# Patient Record
Sex: MALE | Race: WHITE | NOT HISPANIC OR LATINO | Employment: OTHER | RURAL
[De-identification: names, ages, dates, MRNs, and addresses within clinical notes are randomized per-mention and may not be internally consistent; named-entity substitution may affect disease eponyms.]

---

## 2014-06-30 LAB — CRC RECOMMENDATION EXT: NORMAL

## 2020-08-18 ENCOUNTER — HISTORICAL (OUTPATIENT)
Dept: ADMINISTRATIVE | Facility: HOSPITAL | Age: 75
End: 2020-08-18

## 2020-08-18 LAB
ALT SERPL W P-5'-P-CCNC: 26 U/L (ref 16–61)
CHOLEST SERPL-MCNC: 119 MG/DL
CHOLEST/HDLC SERPL: 2.7 {RATIO}
HDLC SERPL-MCNC: 44 MG/DL
LDLC SERPL CALC-MCNC: 57 MG/DL
TRIGL SERPL-MCNC: 90 MG/DL

## 2020-10-23 ENCOUNTER — HISTORICAL (OUTPATIENT)
Dept: ADMINISTRATIVE | Facility: HOSPITAL | Age: 75
End: 2020-10-23

## 2021-05-27 ENCOUNTER — OFFICE VISIT (OUTPATIENT)
Dept: FAMILY MEDICINE | Facility: CLINIC | Age: 76
End: 2021-05-27
Payer: MEDICARE

## 2021-05-27 VITALS
HEIGHT: 73 IN | RESPIRATION RATE: 18 BRPM | WEIGHT: 239 LBS | SYSTOLIC BLOOD PRESSURE: 111 MMHG | HEART RATE: 81 BPM | DIASTOLIC BLOOD PRESSURE: 59 MMHG | BODY MASS INDEX: 31.68 KG/M2 | OXYGEN SATURATION: 96 %

## 2021-05-27 DIAGNOSIS — R50.9 FEVER, UNSPECIFIED FEVER CAUSE: Primary | ICD-10-CM

## 2021-05-27 DIAGNOSIS — N41.9 PROSTATITIS, UNSPECIFIED PROSTATITIS TYPE: ICD-10-CM

## 2021-05-27 DIAGNOSIS — K21.9 GASTROESOPHAGEAL REFLUX DISEASE, UNSPECIFIED WHETHER ESOPHAGITIS PRESENT: Chronic | ICD-10-CM

## 2021-05-27 DIAGNOSIS — I10 ESSENTIAL HYPERTENSION: Chronic | ICD-10-CM

## 2021-05-27 DIAGNOSIS — M54.50 LOW BACK PAIN, UNSPECIFIED BACK PAIN LATERALITY, UNSPECIFIED CHRONICITY, UNSPECIFIED WHETHER SCIATICA PRESENT: ICD-10-CM

## 2021-05-27 DIAGNOSIS — M54.9 BACK PAIN, UNSPECIFIED BACK LOCATION, UNSPECIFIED BACK PAIN LATERALITY, UNSPECIFIED CHRONICITY: ICD-10-CM

## 2021-05-27 LAB
BILIRUB UR QL STRIP: NEGATIVE
CLARITY UR: CLEAR
COLOR UR: YELLOW
GLUCOSE UR STRIP-MCNC: NEGATIVE MG/DL
KETONES UR STRIP-SCNC: NEGATIVE MG/DL
LEUKOCYTE ESTERASE UR QL STRIP: NEGATIVE
NITRITE UR QL STRIP: NEGATIVE
PH UR STRIP: 5 PH UNITS
PROT UR QL STRIP: NEGATIVE
RBC # UR STRIP: NEGATIVE /UL
SP GR UR STRIP: 1.02
UROBILINOGEN UR STRIP-ACNC: 1 MG/DL

## 2021-05-27 PROCEDURE — 99214 PR OFFICE/OUTPT VISIT, EST, LEVL IV, 30-39 MIN: ICD-10-PCS | Mod: 25,,, | Performed by: FAMILY MEDICINE

## 2021-05-27 PROCEDURE — 84153 ASSAY OF PSA TOTAL: CPT | Mod: ,,, | Performed by: CLINICAL MEDICAL LABORATORY

## 2021-05-27 PROCEDURE — 81003 URINALYSIS AUTO W/O SCOPE: CPT | Mod: QW,,, | Performed by: CLINICAL MEDICAL LABORATORY

## 2021-05-27 PROCEDURE — 96372 PR INJECTION,THERAP/PROPH/DIAG2ST, IM OR SUBCUT: ICD-10-PCS | Mod: ,,, | Performed by: FAMILY MEDICINE

## 2021-05-27 PROCEDURE — 80053 COMPREHENSIVE METABOLIC PANEL: ICD-10-PCS | Mod: ,,, | Performed by: CLINICAL MEDICAL LABORATORY

## 2021-05-27 PROCEDURE — 81003 URINALYSIS, REFLEX TO URINE CULTURE: ICD-10-PCS | Mod: QW,,, | Performed by: CLINICAL MEDICAL LABORATORY

## 2021-05-27 PROCEDURE — 99214 OFFICE O/P EST MOD 30 MIN: CPT | Mod: 25,,, | Performed by: FAMILY MEDICINE

## 2021-05-27 PROCEDURE — 80053 COMPREHEN METABOLIC PANEL: CPT | Mod: ,,, | Performed by: CLINICAL MEDICAL LABORATORY

## 2021-05-27 PROCEDURE — 84153 PSA, TOTAL (DIAGNOSTIC): ICD-10-PCS | Mod: ,,, | Performed by: CLINICAL MEDICAL LABORATORY

## 2021-05-27 PROCEDURE — 96372 THER/PROPH/DIAG INJ SC/IM: CPT | Mod: ,,, | Performed by: FAMILY MEDICINE

## 2021-05-27 RX ORDER — ONDANSETRON 4 MG/1
TABLET, FILM COATED ORAL
COMMUNITY
Start: 2021-04-21 | End: 2022-03-25 | Stop reason: ALTCHOICE

## 2021-05-27 RX ORDER — METOPROLOL TARTRATE 25 MG/1
TABLET, FILM COATED ORAL
COMMUNITY
Start: 2021-04-13 | End: 2022-04-08 | Stop reason: SDUPTHER

## 2021-05-27 RX ORDER — CLOPIDOGREL BISULFATE 75 MG/1
75 TABLET ORAL DAILY
COMMUNITY
Start: 2021-04-23 | End: 2022-03-21

## 2021-05-27 RX ORDER — KETOROLAC TROMETHAMINE 30 MG/ML
60 INJECTION, SOLUTION INTRAMUSCULAR; INTRAVENOUS
Status: COMPLETED | OUTPATIENT
Start: 2021-05-27 | End: 2021-05-27

## 2021-05-27 RX ORDER — AMOXICILLIN 500 MG
1 CAPSULE ORAL DAILY
COMMUNITY
End: 2022-04-08 | Stop reason: SDUPTHER

## 2021-05-27 RX ORDER — FUROSEMIDE 20 MG/1
20 TABLET ORAL DAILY
COMMUNITY
Start: 2021-03-08 | End: 2021-06-03

## 2021-05-27 RX ORDER — ASPIRIN 81 MG/1
81 TABLET ORAL DAILY
COMMUNITY
End: 2023-07-10

## 2021-05-27 RX ORDER — OMEPRAZOLE 20 MG/1
20 CAPSULE, DELAYED RELEASE ORAL DAILY
Qty: 90 CAPSULE | Refills: 1 | Status: SHIPPED | OUTPATIENT
Start: 2021-05-27 | End: 2022-02-15

## 2021-05-27 RX ORDER — FLUTICASONE FUROATE AND VILANTEROL TRIFENATATE 200; 25 UG/1; UG/1
1 POWDER RESPIRATORY (INHALATION) DAILY
COMMUNITY
Start: 2021-05-17

## 2021-05-27 RX ORDER — LISINOPRIL 10 MG/1
10 TABLET ORAL DAILY
COMMUNITY
Start: 2021-05-06 | End: 2021-10-26 | Stop reason: SDUPTHER

## 2021-05-27 RX ORDER — OMEPRAZOLE 20 MG/1
20 CAPSULE, DELAYED RELEASE ORAL DAILY
COMMUNITY
End: 2021-05-27 | Stop reason: SDUPTHER

## 2021-05-27 RX ADMIN — KETOROLAC TROMETHAMINE 60 MG: 30 INJECTION, SOLUTION INTRAMUSCULAR; INTRAVENOUS at 04:05

## 2021-05-28 DIAGNOSIS — N41.9 PROSTATITIS, UNSPECIFIED PROSTATITIS TYPE: Primary | ICD-10-CM

## 2021-05-28 LAB
ALBUMIN SERPL BCP-MCNC: 3.5 G/DL (ref 3.5–5)
ALBUMIN/GLOB SERPL: 1.1 {RATIO}
ALP SERPL-CCNC: 121 U/L (ref 45–115)
ALT SERPL W P-5'-P-CCNC: 22 U/L (ref 16–61)
ANION GAP SERPL CALCULATED.3IONS-SCNC: 7 MMOL/L (ref 7–16)
AST SERPL W P-5'-P-CCNC: 17 U/L (ref 15–37)
BILIRUB SERPL-MCNC: 0.6 MG/DL (ref 0–1.2)
BUN SERPL-MCNC: 19 MG/DL (ref 7–18)
BUN/CREAT SERPL: 12 (ref 6–20)
CALCIUM SERPL-MCNC: 8.8 MG/DL (ref 8.5–10.1)
CHLORIDE SERPL-SCNC: 105 MMOL/L (ref 98–107)
CO2 SERPL-SCNC: 29 MMOL/L (ref 21–32)
CREAT SERPL-MCNC: 1.56 MG/DL (ref 0.7–1.3)
GLOBULIN SER-MCNC: 3.3 G/DL (ref 2–4)
GLUCOSE SERPL-MCNC: 105 MG/DL (ref 74–106)
POTASSIUM SERPL-SCNC: 4.1 MMOL/L (ref 3.5–5.1)
PROT SERPL-MCNC: 6.8 G/DL (ref 6.4–8.2)
PSA SERPL-MCNC: 39.7 NG/ML (ref 0–4.4)
SODIUM SERPL-SCNC: 137 MMOL/L (ref 136–145)

## 2021-05-28 RX ORDER — CIPROFLOXACIN 500 MG/1
500 TABLET ORAL 2 TIMES DAILY
Qty: 60 TABLET | Refills: 0 | Status: SHIPPED | OUTPATIENT
Start: 2021-05-28 | End: 2021-06-27

## 2021-06-04 ENCOUNTER — HOSPITAL ENCOUNTER (OUTPATIENT)
Dept: RADIOLOGY | Facility: HOSPITAL | Age: 76
Discharge: HOME | End: 2021-06-04
Attending: FAMILY MEDICINE
Payer: MEDICARE

## 2021-06-04 ENCOUNTER — OFFICE VISIT (OUTPATIENT)
Dept: FAMILY MEDICINE | Facility: CLINIC | Age: 76
End: 2021-06-04
Payer: MEDICARE

## 2021-06-04 VITALS
BODY MASS INDEX: 30.48 KG/M2 | TEMPERATURE: 98 F | DIASTOLIC BLOOD PRESSURE: 56 MMHG | RESPIRATION RATE: 18 BRPM | HEART RATE: 73 BPM | HEIGHT: 73 IN | OXYGEN SATURATION: 95 % | WEIGHT: 230 LBS | SYSTOLIC BLOOD PRESSURE: 107 MMHG

## 2021-06-04 DIAGNOSIS — I82.401 ACUTE DEEP VEIN THROMBOSIS (DVT) OF RIGHT LOWER EXTREMITY, UNSPECIFIED VEIN: ICD-10-CM

## 2021-06-04 DIAGNOSIS — M25.569 KNEE PAIN, UNSPECIFIED CHRONICITY, UNSPECIFIED LATERALITY: ICD-10-CM

## 2021-06-04 DIAGNOSIS — M25.469 KNEE SWELLING: ICD-10-CM

## 2021-06-04 DIAGNOSIS — T81.72XA COMPLICATION OF VEIN FOLLOWING A PROCEDURE, NOT ELSEWHERE CLASSIFIED, INITIAL ENCOUNTER: ICD-10-CM

## 2021-06-04 DIAGNOSIS — T84.50XA INFECTION OF PROSTHETIC JOINT, INITIAL ENCOUNTER: Primary | ICD-10-CM

## 2021-06-04 PROCEDURE — 99214 OFFICE O/P EST MOD 30 MIN: CPT | Mod: ,,, | Performed by: FAMILY MEDICINE

## 2021-06-04 PROCEDURE — 99214 PR OFFICE/OUTPT VISIT, EST, LEVL IV, 30-39 MIN: ICD-10-PCS | Mod: ,,, | Performed by: FAMILY MEDICINE

## 2021-06-04 PROCEDURE — 93970 EXTREMITY STUDY: CPT | Mod: TC

## 2021-06-04 RX ORDER — HYDROCODONE BITARTRATE AND ACETAMINOPHEN 7.5; 325 MG/1; MG/1
1 TABLET ORAL EVERY 6 HOURS PRN
Qty: 15 TABLET | Refills: 0 | Status: SHIPPED | OUTPATIENT
Start: 2021-06-04 | End: 2022-03-25 | Stop reason: ALTCHOICE

## 2021-06-04 RX ORDER — CLINDAMYCIN HYDROCHLORIDE 300 MG/1
300 CAPSULE ORAL 3 TIMES DAILY
Qty: 30 CAPSULE | Refills: 0 | Status: SHIPPED | OUTPATIENT
Start: 2021-06-04 | End: 2022-03-25 | Stop reason: ALTCHOICE

## 2021-06-04 RX ORDER — TRAMADOL HYDROCHLORIDE 50 MG/1
TABLET ORAL
COMMUNITY
Start: 2021-06-03 | End: 2022-03-25 | Stop reason: ALTCHOICE

## 2021-07-29 ENCOUNTER — OFFICE VISIT (OUTPATIENT)
Dept: FAMILY MEDICINE | Facility: CLINIC | Age: 76
End: 2021-07-29
Payer: MEDICARE

## 2021-07-29 VITALS
BODY MASS INDEX: 30.3 KG/M2 | HEIGHT: 73 IN | HEART RATE: 78 BPM | OXYGEN SATURATION: 98 % | TEMPERATURE: 98 F | WEIGHT: 228.63 LBS | RESPIRATION RATE: 18 BRPM | DIASTOLIC BLOOD PRESSURE: 68 MMHG | SYSTOLIC BLOOD PRESSURE: 112 MMHG

## 2021-07-29 DIAGNOSIS — K21.9 GASTROESOPHAGEAL REFLUX DISEASE WITHOUT ESOPHAGITIS: Chronic | ICD-10-CM

## 2021-07-29 DIAGNOSIS — E55.9 VITAMIN D DEFICIENCY: ICD-10-CM

## 2021-07-29 DIAGNOSIS — E78.2 MIXED HYPERLIPIDEMIA: Chronic | ICD-10-CM

## 2021-07-29 DIAGNOSIS — E53.8 VITAMIN B12 DEFICIENCY: ICD-10-CM

## 2021-07-29 DIAGNOSIS — I10 ESSENTIAL HYPERTENSION: Chronic | ICD-10-CM

## 2021-07-29 DIAGNOSIS — N41.0 ACUTE PROSTATITIS: ICD-10-CM

## 2021-07-29 DIAGNOSIS — R53.82 CHRONIC FATIGUE: Primary | ICD-10-CM

## 2021-07-29 DIAGNOSIS — R97.20 ELEVATED PSA, GREATER THAN OR EQUAL TO 20 NG/ML: ICD-10-CM

## 2021-07-29 DIAGNOSIS — N28.9 ABNORMAL KIDNEY FUNCTION: Chronic | ICD-10-CM

## 2021-07-29 LAB
25(OH)D3 SERPL-MCNC: 21.9 NG/ML
ALBUMIN SERPL BCP-MCNC: 3.2 G/DL (ref 3.5–5)
ALBUMIN/GLOB SERPL: 0.8 {RATIO}
ALP SERPL-CCNC: 113 U/L (ref 45–115)
ALT SERPL W P-5'-P-CCNC: 25 U/L (ref 16–61)
ANION GAP SERPL CALCULATED.3IONS-SCNC: 11 MMOL/L (ref 7–16)
AST SERPL W P-5'-P-CCNC: 22 U/L (ref 15–37)
BASOPHILS # BLD AUTO: 0.03 K/UL (ref 0–0.2)
BASOPHILS NFR BLD AUTO: 0.5 % (ref 0–1)
BILIRUB SERPL-MCNC: 0.3 MG/DL (ref 0–1.2)
BUN SERPL-MCNC: 11 MG/DL (ref 7–18)
BUN/CREAT SERPL: 10 (ref 6–20)
CALCIUM SERPL-MCNC: 9 MG/DL (ref 8.5–10.1)
CHLORIDE SERPL-SCNC: 103 MMOL/L (ref 98–107)
CO2 SERPL-SCNC: 28 MMOL/L (ref 21–32)
CREAT SERPL-MCNC: 1.13 MG/DL (ref 0.7–1.3)
DIFFERENTIAL METHOD BLD: ABNORMAL
EOSINOPHIL # BLD AUTO: 0.17 K/UL (ref 0–0.5)
EOSINOPHIL NFR BLD AUTO: 2.7 % (ref 1–4)
ERYTHROCYTE [DISTWIDTH] IN BLOOD BY AUTOMATED COUNT: 13.7 % (ref 11.5–14.5)
GLOBULIN SER-MCNC: 4.1 G/DL (ref 2–4)
GLUCOSE SERPL-MCNC: 88 MG/DL (ref 74–106)
HCT VFR BLD AUTO: 37.8 % (ref 40–54)
HGB BLD-MCNC: 12.2 G/DL (ref 13.5–18)
IMM GRANULOCYTES # BLD AUTO: 0.02 K/UL (ref 0–0.04)
IMM GRANULOCYTES NFR BLD: 0.3 % (ref 0–0.4)
LYMPHOCYTES # BLD AUTO: 1.73 K/UL (ref 1–4.8)
LYMPHOCYTES NFR BLD AUTO: 27.1 % (ref 27–41)
MCH RBC QN AUTO: 30.6 PG (ref 27–31)
MCHC RBC AUTO-ENTMCNC: 32.3 G/DL (ref 32–36)
MCV RBC AUTO: 94.7 FL (ref 80–96)
MONOCYTES # BLD AUTO: 0.63 K/UL (ref 0–0.8)
MONOCYTES NFR BLD AUTO: 9.9 % (ref 2–6)
MPC BLD CALC-MCNC: 9 FL (ref 9.4–12.4)
NEUTROPHILS # BLD AUTO: 3.81 K/UL (ref 1.8–7.7)
NEUTROPHILS NFR BLD AUTO: 59.5 % (ref 53–65)
NRBC # BLD AUTO: 0 X10E3/UL
NRBC, AUTO (.00): 0 %
PLATELET # BLD AUTO: 320 K/UL (ref 150–400)
POTASSIUM SERPL-SCNC: 4.5 MMOL/L (ref 3.5–5.1)
PROT SERPL-MCNC: 7.3 G/DL (ref 6.4–8.2)
PSA SERPL-MCNC: 42.4 NG/ML (ref 0–4.4)
RBC # BLD AUTO: 3.99 M/UL (ref 4.6–6.2)
SODIUM SERPL-SCNC: 137 MMOL/L (ref 136–145)
T4 FREE SERPL-MCNC: 1.08 NG/DL (ref 0.76–1.46)
TSH SERPL DL<=0.005 MIU/L-ACNC: 1.84 UIU/ML (ref 0.36–3.74)
VIT B12 SERPL-MCNC: 381 PG/ML (ref 193–986)
WBC # BLD AUTO: 6.39 K/UL (ref 4.5–11)

## 2021-07-29 PROCEDURE — 82607 VITAMIN B12: ICD-10-PCS | Mod: ,,, | Performed by: CLINICAL MEDICAL LABORATORY

## 2021-07-29 PROCEDURE — 84443 THYROID PANEL: ICD-10-PCS | Mod: ,,, | Performed by: CLINICAL MEDICAL LABORATORY

## 2021-07-29 PROCEDURE — 84439 ASSAY OF FREE THYROXINE: CPT | Mod: ,,, | Performed by: CLINICAL MEDICAL LABORATORY

## 2021-07-29 PROCEDURE — 84439 THYROID PANEL: ICD-10-PCS | Mod: ,,, | Performed by: CLINICAL MEDICAL LABORATORY

## 2021-07-29 PROCEDURE — 99214 OFFICE O/P EST MOD 30 MIN: CPT | Mod: ,,, | Performed by: FAMILY MEDICINE

## 2021-07-29 PROCEDURE — 80053 COMPREHENSIVE METABOLIC PANEL: ICD-10-PCS | Mod: ,,, | Performed by: CLINICAL MEDICAL LABORATORY

## 2021-07-29 PROCEDURE — 82306 VITAMIN D: ICD-10-PCS | Mod: ,,, | Performed by: CLINICAL MEDICAL LABORATORY

## 2021-07-29 PROCEDURE — 85025 COMPLETE CBC W/AUTO DIFF WBC: CPT | Mod: ,,, | Performed by: CLINICAL MEDICAL LABORATORY

## 2021-07-29 PROCEDURE — 99214 PR OFFICE/OUTPT VISIT, EST, LEVL IV, 30-39 MIN: ICD-10-PCS | Mod: ,,, | Performed by: FAMILY MEDICINE

## 2021-07-29 PROCEDURE — 82306 VITAMIN D 25 HYDROXY: CPT | Mod: ,,, | Performed by: CLINICAL MEDICAL LABORATORY

## 2021-07-29 PROCEDURE — 84153 PSA, TOTAL (DIAGNOSTIC): ICD-10-PCS | Mod: ,,, | Performed by: CLINICAL MEDICAL LABORATORY

## 2021-07-29 PROCEDURE — 80053 COMPREHEN METABOLIC PANEL: CPT | Mod: ,,, | Performed by: CLINICAL MEDICAL LABORATORY

## 2021-07-29 PROCEDURE — 84153 ASSAY OF PSA TOTAL: CPT | Mod: ,,, | Performed by: CLINICAL MEDICAL LABORATORY

## 2021-07-29 PROCEDURE — 84443 ASSAY THYROID STIM HORMONE: CPT | Mod: ,,, | Performed by: CLINICAL MEDICAL LABORATORY

## 2021-07-29 PROCEDURE — 82607 VITAMIN B-12: CPT | Mod: ,,, | Performed by: CLINICAL MEDICAL LABORATORY

## 2021-07-29 PROCEDURE — 85025 CBC WITH DIFFERENTIAL: ICD-10-PCS | Mod: ,,, | Performed by: CLINICAL MEDICAL LABORATORY

## 2021-07-29 RX ORDER — TAMSULOSIN HYDROCHLORIDE 0.4 MG/1
0.4 CAPSULE ORAL DAILY
Qty: 30 CAPSULE | Refills: 2 | Status: SHIPPED | OUTPATIENT
Start: 2021-07-29 | End: 2022-03-25 | Stop reason: ALTCHOICE

## 2021-07-30 ENCOUNTER — TELEPHONE (OUTPATIENT)
Dept: FAMILY MEDICINE | Facility: CLINIC | Age: 76
End: 2021-07-30

## 2021-07-30 DIAGNOSIS — N41.0 ACUTE PROSTATITIS: Primary | ICD-10-CM

## 2021-10-04 ENCOUNTER — HOSPITAL ENCOUNTER (OUTPATIENT)
Dept: CARDIOLOGY | Facility: HOSPITAL | Age: 76
Discharge: HOME | End: 2021-10-04
Attending: INTERNAL MEDICINE
Payer: MEDICARE

## 2021-10-04 DIAGNOSIS — Z95.810 PRESENCE OF CARDIAC DEFIBRILLATOR: ICD-10-CM

## 2021-10-04 DIAGNOSIS — Z95.810 PRESENCE OF CARDIAC DEFIBRILLATOR: Primary | ICD-10-CM

## 2021-10-04 PROCEDURE — 93295 CARDIAC DEVICE CHECK - REMOTE: ICD-10-PCS | Mod: ,,, | Performed by: INTERNAL MEDICINE

## 2021-10-04 PROCEDURE — 93296 REM INTERROG EVL PM/IDS: CPT

## 2021-10-04 PROCEDURE — 93295 DEV INTERROG REMOTE 1/2/MLT: CPT | Mod: ,,, | Performed by: INTERNAL MEDICINE

## 2021-10-06 LAB
OHS CV DC PP MS1: 0.4 MS
OHS CV DC PP MS2: 0.4 MS
OHS CV DC PP V1: 1.5 V
OHS CV DC PP V2: 2 V

## 2021-10-25 ENCOUNTER — OFFICE VISIT (OUTPATIENT)
Dept: CARDIOLOGY | Facility: CLINIC | Age: 76
End: 2021-10-25
Payer: MEDICARE

## 2021-10-25 VITALS
DIASTOLIC BLOOD PRESSURE: 54 MMHG | RESPIRATION RATE: 16 BRPM | WEIGHT: 234 LBS | HEIGHT: 73 IN | HEART RATE: 64 BPM | BODY MASS INDEX: 31.01 KG/M2 | SYSTOLIC BLOOD PRESSURE: 90 MMHG

## 2021-10-25 DIAGNOSIS — I25.5 ISCHEMIC CARDIOMYOPATHY: ICD-10-CM

## 2021-10-25 DIAGNOSIS — I25.10 CORONARY ARTERY DISEASE INVOLVING NATIVE HEART WITHOUT ANGINA PECTORIS, UNSPECIFIED VESSEL OR LESION TYPE: Primary | ICD-10-CM

## 2021-10-25 DIAGNOSIS — E78.5 HYPERLIPIDEMIA, UNSPECIFIED HYPERLIPIDEMIA TYPE: ICD-10-CM

## 2021-10-25 DIAGNOSIS — R53.83 FATIGUE, UNSPECIFIED TYPE: ICD-10-CM

## 2021-10-25 DIAGNOSIS — I10 HYPERTENSION, ESSENTIAL: ICD-10-CM

## 2021-10-25 PROCEDURE — 99214 OFFICE O/P EST MOD 30 MIN: CPT | Mod: PBBFAC | Performed by: INTERNAL MEDICINE

## 2021-10-25 PROCEDURE — 93010 ELECTROCARDIOGRAM REPORT: CPT | Mod: S$PBB,,, | Performed by: INTERNAL MEDICINE

## 2021-10-25 PROCEDURE — 99214 OFFICE O/P EST MOD 30 MIN: CPT | Mod: S$PBB,,, | Performed by: INTERNAL MEDICINE

## 2021-10-25 PROCEDURE — 93010 EKG 12-LEAD: ICD-10-PCS | Mod: S$PBB,,, | Performed by: INTERNAL MEDICINE

## 2021-10-25 PROCEDURE — 99214 PR OFFICE/OUTPT VISIT, EST, LEVL IV, 30-39 MIN: ICD-10-PCS | Mod: S$PBB,,, | Performed by: INTERNAL MEDICINE

## 2021-10-25 PROCEDURE — 93005 ELECTROCARDIOGRAM TRACING: CPT | Mod: PBBFAC | Performed by: INTERNAL MEDICINE

## 2021-10-26 PROBLEM — R53.83 FATIGUE: Status: ACTIVE | Noted: 2021-07-29

## 2021-10-26 PROBLEM — I25.10 CORONARY ARTERY DISEASE INVOLVING NATIVE HEART WITHOUT ANGINA PECTORIS: Status: ACTIVE | Noted: 2021-10-26

## 2021-10-26 PROBLEM — I25.5 ISCHEMIC CARDIOMYOPATHY: Status: ACTIVE | Noted: 2021-10-26

## 2021-10-26 RX ORDER — FUROSEMIDE 20 MG/1
20 TABLET ORAL DAILY
Qty: 90 TABLET | Refills: 3 | Status: SHIPPED | OUTPATIENT
Start: 2021-10-26 | End: 2021-12-06

## 2021-10-26 RX ORDER — LISINOPRIL 10 MG/1
10 TABLET ORAL DAILY
Qty: 90 TABLET | Refills: 3 | Status: SHIPPED | OUTPATIENT
Start: 2021-10-26 | End: 2022-10-10

## 2021-10-26 RX ORDER — ATORVASTATIN CALCIUM 80 MG/1
80 TABLET, FILM COATED ORAL DAILY
Qty: 90 TABLET | Refills: 3 | Status: SHIPPED | OUTPATIENT
Start: 2021-10-26 | End: 2022-03-25 | Stop reason: ALTCHOICE

## 2021-11-17 ENCOUNTER — OFFICE VISIT (OUTPATIENT)
Dept: CARDIOLOGY | Facility: CLINIC | Age: 76
End: 2021-11-17
Payer: MEDICARE

## 2021-11-17 VITALS
WEIGHT: 239 LBS | RESPIRATION RATE: 16 BRPM | HEART RATE: 62 BPM | HEIGHT: 73 IN | SYSTOLIC BLOOD PRESSURE: 88 MMHG | BODY MASS INDEX: 31.68 KG/M2 | DIASTOLIC BLOOD PRESSURE: 52 MMHG

## 2021-11-17 DIAGNOSIS — I25.10 CORONARY ARTERY DISEASE INVOLVING NATIVE HEART WITHOUT ANGINA PECTORIS, UNSPECIFIED VESSEL OR LESION TYPE: Primary | Chronic | ICD-10-CM

## 2021-11-17 DIAGNOSIS — I10 HYPERTENSION, ESSENTIAL: Chronic | ICD-10-CM

## 2021-11-17 DIAGNOSIS — I25.5 ISCHEMIC CARDIOMYOPATHY: Chronic | ICD-10-CM

## 2021-11-17 DIAGNOSIS — I35.8 AORTIC HEART MURMUR: Chronic | ICD-10-CM

## 2021-11-17 DIAGNOSIS — J44.9 CHRONIC OBSTRUCTIVE PULMONARY DISEASE, UNSPECIFIED COPD TYPE: Chronic | ICD-10-CM

## 2021-11-17 PROCEDURE — 99214 OFFICE O/P EST MOD 30 MIN: CPT | Mod: S$PBB,,, | Performed by: INTERNAL MEDICINE

## 2021-11-17 PROCEDURE — 99214 PR OFFICE/OUTPT VISIT, EST, LEVL IV, 30-39 MIN: ICD-10-PCS | Mod: S$PBB,,, | Performed by: INTERNAL MEDICINE

## 2021-11-17 PROCEDURE — 99214 OFFICE O/P EST MOD 30 MIN: CPT | Mod: PBBFAC | Performed by: INTERNAL MEDICINE

## 2021-11-19 PROBLEM — I35.8 AORTIC HEART MURMUR: Chronic | Status: ACTIVE | Noted: 2021-11-19

## 2021-11-19 PROBLEM — J44.9 CHRONIC OBSTRUCTIVE PULMONARY DISEASE: Chronic | Status: ACTIVE | Noted: 2021-11-19

## 2022-01-10 ENCOUNTER — HOSPITAL ENCOUNTER (OUTPATIENT)
Dept: CARDIOLOGY | Facility: HOSPITAL | Age: 77
Discharge: HOME | End: 2022-01-10
Attending: INTERNAL MEDICINE
Payer: MEDICARE

## 2022-01-10 DIAGNOSIS — Z95.810 PRESENCE OF CARDIAC DEFIBRILLATOR: ICD-10-CM

## 2022-01-10 DIAGNOSIS — Z95.810 PRESENCE OF CARDIAC DEFIBRILLATOR: Primary | ICD-10-CM

## 2022-01-10 PROCEDURE — 93296 REM INTERROG EVL PM/IDS: CPT

## 2022-01-10 PROCEDURE — 93295 DEV INTERROG REMOTE 1/2/MLT: CPT | Mod: ,,, | Performed by: INTERNAL MEDICINE

## 2022-01-10 PROCEDURE — 93295 CARDIAC DEVICE CHECK - REMOTE: ICD-10-PCS | Mod: ,,, | Performed by: INTERNAL MEDICINE

## 2022-01-18 LAB
OHS CV DC PP MS1: 0.4 MS
OHS CV DC PP MS2: 0.4 MS
OHS CV DC PP V1: 1.5 V
OHS CV DC PP V2: 2.25 V

## 2022-01-19 DIAGNOSIS — Z95.810 PRESENCE OF CARDIAC DEFIBRILLATOR: Primary | ICD-10-CM

## 2022-03-11 DIAGNOSIS — Z71.89 COMPLEX CARE COORDINATION: ICD-10-CM

## 2022-03-22 RX ORDER — CLOPIDOGREL BISULFATE 75 MG/1
TABLET ORAL
Qty: 90 TABLET | Refills: 0 | Status: SHIPPED | OUTPATIENT
Start: 2022-03-22 | End: 2022-03-23 | Stop reason: SDUPTHER

## 2022-03-23 RX ORDER — CLOPIDOGREL BISULFATE 75 MG/1
75 TABLET ORAL DAILY
Qty: 90 TABLET | Refills: 3 | Status: SHIPPED | OUTPATIENT
Start: 2022-03-23 | End: 2023-02-06 | Stop reason: SDUPTHER

## 2022-03-25 ENCOUNTER — OFFICE VISIT (OUTPATIENT)
Dept: FAMILY MEDICINE | Facility: CLINIC | Age: 77
End: 2022-03-25
Payer: MEDICARE

## 2022-03-25 VITALS
SYSTOLIC BLOOD PRESSURE: 110 MMHG | HEIGHT: 73 IN | WEIGHT: 245 LBS | TEMPERATURE: 98 F | OXYGEN SATURATION: 98 % | HEART RATE: 75 BPM | DIASTOLIC BLOOD PRESSURE: 63 MMHG | RESPIRATION RATE: 20 BRPM | BODY MASS INDEX: 32.47 KG/M2

## 2022-03-25 DIAGNOSIS — I10 HYPERTENSION, ESSENTIAL: Chronic | ICD-10-CM

## 2022-03-25 DIAGNOSIS — M25.511 CHRONIC RIGHT SHOULDER PAIN: Primary | Chronic | ICD-10-CM

## 2022-03-25 DIAGNOSIS — J44.9 CHRONIC OBSTRUCTIVE PULMONARY DISEASE, UNSPECIFIED COPD TYPE: Chronic | ICD-10-CM

## 2022-03-25 DIAGNOSIS — G89.29 CHRONIC RIGHT SHOULDER PAIN: Primary | Chronic | ICD-10-CM

## 2022-03-25 DIAGNOSIS — M19.011 OSTEOARTHRITIS OF RIGHT SHOULDER, UNSPECIFIED OSTEOARTHRITIS TYPE: Chronic | ICD-10-CM

## 2022-03-25 PROCEDURE — 99214 PR OFFICE/OUTPT VISIT, EST, LEVL IV, 30-39 MIN: ICD-10-PCS | Mod: ,,, | Performed by: FAMILY MEDICINE

## 2022-03-25 PROCEDURE — 20610 DRAIN/INJ JOINT/BURSA W/O US: CPT | Mod: RT,,, | Performed by: FAMILY MEDICINE

## 2022-03-25 PROCEDURE — 20610 PR DRAIN/INJECT LARGE JOINT/BURSA: ICD-10-PCS | Mod: RT,,, | Performed by: FAMILY MEDICINE

## 2022-03-25 PROCEDURE — 99214 OFFICE O/P EST MOD 30 MIN: CPT | Mod: ,,, | Performed by: FAMILY MEDICINE

## 2022-03-25 RX ORDER — NAPROXEN SODIUM 220 MG/1
TABLET, FILM COATED ORAL
COMMUNITY
End: 2022-03-25 | Stop reason: ALTCHOICE

## 2022-03-25 RX ORDER — BENZONATATE 100 MG/1
100 CAPSULE ORAL 3 TIMES DAILY PRN
Qty: 60 CAPSULE | Refills: 2 | Status: SHIPPED | OUTPATIENT
Start: 2022-03-25 | End: 2022-04-04

## 2022-03-25 RX ORDER — POTASSIUM CHLORIDE 750 MG/1
CAPSULE, EXTENDED RELEASE ORAL
COMMUNITY
End: 2022-03-25 | Stop reason: ALTCHOICE

## 2022-03-25 RX ORDER — ALIROCUMAB 75 MG/ML
INJECTION, SOLUTION SUBCUTANEOUS
COMMUNITY
End: 2022-03-25 | Stop reason: ALTCHOICE

## 2022-03-25 RX ORDER — TRIAMCINOLONE ACETONIDE 40 MG/ML
80 INJECTION, SUSPENSION INTRA-ARTICULAR; INTRAMUSCULAR
Status: COMPLETED | OUTPATIENT
Start: 2022-03-25 | End: 2022-03-25

## 2022-03-25 RX ORDER — FLUTICASONE PROPIONATE AND SALMETEROL 250; 50 UG/1; UG/1
POWDER RESPIRATORY (INHALATION)
COMMUNITY
End: 2022-03-25 | Stop reason: ALTCHOICE

## 2022-03-25 RX ORDER — ATORVASTATIN CALCIUM 40 MG/1
TABLET, FILM COATED ORAL
COMMUNITY
End: 2022-04-26 | Stop reason: SDUPTHER

## 2022-03-25 RX ORDER — ALBUTEROL SULFATE 90 UG/1
AEROSOL, METERED RESPIRATORY (INHALATION)
COMMUNITY

## 2022-03-25 RX ORDER — TAMSULOSIN HYDROCHLORIDE 0.4 MG/1
CAPSULE ORAL
COMMUNITY
End: 2022-03-25 | Stop reason: ALTCHOICE

## 2022-03-25 RX ORDER — ALBUTEROL SULFATE 0.83 MG/ML
SOLUTION RESPIRATORY (INHALATION)
COMMUNITY
End: 2023-12-21 | Stop reason: SDUPTHER

## 2022-03-25 RX ORDER — ONDANSETRON 4 MG/1
TABLET, ORALLY DISINTEGRATING ORAL
COMMUNITY
End: 2022-03-25 | Stop reason: ALTCHOICE

## 2022-03-25 RX ORDER — FLUTICASONE FUROATE, UMECLIDINIUM BROMIDE AND VILANTEROL TRIFENATATE 200; 62.5; 25 UG/1; UG/1; UG/1
POWDER RESPIRATORY (INHALATION)
COMMUNITY
Start: 2021-09-09 | End: 2022-03-25 | Stop reason: ALTCHOICE

## 2022-03-25 RX ORDER — LIDOCAINE HYDROCHLORIDE 10 MG/ML
1 INJECTION INFILTRATION; PERINEURAL
Status: COMPLETED | OUTPATIENT
Start: 2022-03-25 | End: 2022-03-25

## 2022-03-25 RX ORDER — LEUPROLIDE ACETATE 7.5 MG
KIT SUBCUTANEOUS
COMMUNITY
End: 2023-07-10

## 2022-03-25 RX ORDER — METOPROLOL SUCCINATE 25 MG/1
TABLET, EXTENDED RELEASE ORAL
COMMUNITY
End: 2023-03-09

## 2022-03-25 RX ORDER — MOMETASONE FUROATE 50 UG/1
SPRAY, METERED NASAL
COMMUNITY
End: 2023-10-03

## 2022-03-25 RX ORDER — GLUCOSAM/CHONDRO/HERB 149/HYAL 750-100 MG
TABLET ORAL
COMMUNITY
End: 2024-03-18

## 2022-03-25 RX ORDER — NAPROXEN SOD/DIPHENHYDRAMINE 220MG-25MG
TABLET ORAL
COMMUNITY
End: 2023-07-10

## 2022-03-25 RX ORDER — IPRATROPIUM BROMIDE AND ALBUTEROL 20; 100 UG/1; UG/1
SPRAY, METERED RESPIRATORY (INHALATION)
COMMUNITY
End: 2022-03-25 | Stop reason: ALTCHOICE

## 2022-03-25 RX ORDER — BETAMETHASONE VALERATE 1.2 MG/G
OINTMENT TOPICAL
Qty: 45 G | Refills: 5 | Status: SHIPPED | OUTPATIENT
Start: 2022-03-25 | End: 2022-06-20

## 2022-03-25 RX ORDER — METOPROLOL TARTRATE 25 MG/1
TABLET, FILM COATED ORAL
COMMUNITY
End: 2022-04-08 | Stop reason: ALTCHOICE

## 2022-03-25 RX ADMIN — TRIAMCINOLONE ACETONIDE 80 MG: 40 INJECTION, SUSPENSION INTRA-ARTICULAR; INTRAMUSCULAR at 03:03

## 2022-03-25 RX ADMIN — LIDOCAINE HYDROCHLORIDE 1 ML: 10 INJECTION INFILTRATION; PERINEURAL at 03:03

## 2022-03-25 NOTE — PROGRESS NOTES
Raymon Tyler MD    [address] [place] [address]     PATIENT NAME: Tiny Gutierrez  : 1945  DATE: 3/25/22  MRN: [id number]      Billing Provider: Raymon Tyler MD  Level of Service:   Patient PCP Information     Provider PCP Type    Raymon Tyler MD General          Reason for Visit / Chief Complaint: Pain (C/o rt shoulder pain- denies any recent injuries) and Sinus Problem (C/o nose has been bloody from sinus for several days/C/o Breo over $400 /C/o red itchy area to left forearm)       Update PCP  Update Chief Complaint         History of Present Illness / Problem Focused Workflow     Tiny Gutierrez presents to the clinic with Pain (C/o rt shoulder pain- denies any recent injuries) and Sinus Problem (C/o nose has been bloody from sinus for several days/C/o Breo over $400 /C/o red itchy area to left forearm)     Right shoulder - chronic pain, history of injections in the shoulder that has helped a lot     COPD - Pulmonology, Dr. Ya at [facility], he has a dry cough, feels like its in the lower portions of his throat, happens most anytime of day, not related to food, he believes his Breo helps, his albuterol helps his breathing but unsure if it helps his cough, he is on as needed home O2    HPI    Review of Systems     Review of Systems   Constitutional: Negative for activity change, appetite change, chills, fatigue and fever.   HENT: Negative for nasal congestion, ear pain, hearing loss, postnasal drip and sore throat.    Respiratory: Negative for cough, chest tightness, shortness of breath and wheezing.    Cardiovascular: Negative for chest pain, palpitations, leg swelling and claudication.   Gastrointestinal: Negative for abdominal pain, change in bowel habit, constipation, diarrhea, nausea, vomiting and change in bowel habit.   Genitourinary: Negative for dysuria.   Musculoskeletal: Negative for arthralgias, back  pain, gait problem and myalgias.        Rt shoulder pain   Integumentary:  Negative for rash.   Neurological: Negative for weakness and headaches.   Psychiatric/Behavioral: Negative for suicidal ideas. The patient is not nervous/anxious.         Medical / Social / Family History     Past Medical History:   Diagnosis Date    Aortic heart murmur     Asthma     Coronary artery disease     Emphysema/COPD     Hyperlipidemia     Hypertension     Ischemic cardiomyopathy     Mitral regurgitation     Old myocardial infarction 03/2001    Tricuspid regurgitation     Vitamin B 12 deficiency     Vitamin D deficiency        Past Surgical History:   Procedure Laterality Date    CARDIAC CATHETERIZATION      CARDIAC DEFIBRILLATOR PLACEMENT      CHOLECYSTECTOMY      CORONARY ARTERY BYPASS GRAFT      TOTAL KNEE ARTHROPLASTY Bilateral        Social History    reports that he has never smoked. He has never used smokeless tobacco. He reports that he does not drink alcohol and does not use drugs.    Family History  's family history includes Emphysema in his father.    Medications and Allergies     Medications  Outpatient Medications Marked as Taking for the 3/25/22 encounter (Office Visit) with Raymon Tyler MD   Medication Sig Dispense Refill    albuterol (PROVENTIL) 2.5 mg /3 mL (0.083 %) nebulizer solution 3 ml      albuterol (PROVENTIL/VENTOLIN HFA) 90 mcg/actuation inhaler 2 puffs as needed      aspirin (ECOTRIN) 81 MG EC tablet Take 81 mg by mouth once daily.      atorvastatin (LIPITOR) 40 MG tablet 1 tablet      BREO ELLIPTA 200-25 mcg/dose DsDv diskus inhaler 1 puff once daily.      clopidogreL (PLAVIX) 75 mg tablet Take 1 tablet (75 mg total) by mouth once daily. 90 tablet 3    furosemide (LASIX) 20 MG tablet Take 1 tablet by mouth once daily 90 tablet 0    leuprolide (ELIGARD) 7.5 mg (1 month) Syrg as directed      lisinopriL 10 MG tablet Take 1 tablet (10 mg total) by mouth once daily. 90  tablet 3    metoprolol succinate (TOPROL-XL) 25 MG 24 hr tablet 1/2 tablet      mometasone (NASONEX) 50 mcg/actuation nasal spray 2 sprays in each nostril      naproxen-diphenhydramine (ALEVE PM) 220-25 mg Tab       omega 3-dha-epa-fish oil 1,000 mg (120 mg-180 mg) Cap 1 capsule      omeprazole (PRILOSEC) 20 MG capsule TAKE 1 CAPSULE BY MOUTH ONCE DAILY FOR  STOMACH 30 capsule 0    [DISCONTINUED] fluticasone-umeclidin-vilanter (TRELEGY ELLIPTA) 200-62.5-25 mcg inhaler 1 puff         Allergies  Review of patient's allergies indicates:   Allergen Reactions    Pollen extracts        Physical Examination     Vitals:    03/25/22 1454   BP: 110/63   Pulse: 75   Resp: 20   Temp: 98 °F (36.7 °C)     Physical Exam  Vitals and nursing note reviewed.   Constitutional:       General: He is not in acute distress.     Appearance: Normal appearance. He is not ill-appearing.   Eyes:      Extraocular Movements: Extraocular movements intact.      Pupils: Pupils are equal, round, and reactive to light.   Cardiovascular:      Rate and Rhythm: Normal rate and regular rhythm.      Heart sounds: Normal heart sounds.   Pulmonary:      Effort: Pulmonary effort is normal.      Breath sounds: Normal breath sounds.   Abdominal:      General: Bowel sounds are normal.      Palpations: Abdomen is soft.   Musculoskeletal:         General: Normal range of motion.   Skin:     Findings: No rash.   Neurological:      General: No focal deficit present.      Mental Status: He is alert and oriented to person, place, and time. Mental status is at baseline.   Psychiatric:         Mood and Affect: Mood normal.         Behavior: Behavior normal.      Procedure Note: Shoulder Injection, Right. Patient confirmed the location and consented to the procedure. The area was cleaned with alcohol. Using a 25 gauge 1.5in needle, I injected 1ml Lidocaine and 2ml steroid. Patient tolerated the procedure well without blood loss.       Assessment and Plan  (including Health Maintenance)      Problem List  Smart Sets  Document Outside HM   :    Plan:         Health Maintenance Due   Topic Date Due    Hepatitis C Screening  Never done    COVID-19 Vaccine (1) Never done    Shingles Vaccine (1 of 2) Never done    TETANUS VACCINE  09/17/2012       Problem List Items Addressed This Visit        Pulmonary    Chronic obstructive pulmonary disease (Chronic)       Cardiac/Vascular    Hypertension, essential (Chronic)       Orthopedic    Chronic right shoulder pain - Primary (Chronic)    Current Assessment & Plan     Injected right shoulder            Osteoarthritis of right shoulder (Chronic)        Chronic right shoulder pain  -     LIDOcaine HCL 10 mg/ml (1%) injection 1 mL  -     triamcinolone acetonide injection 80 mg    Hypertension, essential    Chronic obstructive pulmonary disease, unspecified COPD type    Osteoarthritis of right shoulder, unspecified osteoarthritis type    Other orders  -     betamethasone valerate 0.1% (VALISONE) 0.1 % Oint; Apply a thin layer of ointment to the left arm, twice daily as needed for RASH  Dispense: 45 g; Refill: 5  -     benzonatate (TESSALON) 100 MG capsule; Take 1 capsule (100 mg total) by mouth 3 (three) times daily as needed for Cough.  Dispense: 60 capsule; Refill: 2       Health Maintenance Topics with due status: Not Due       Topic Last Completion Date    Lipid Panel 10/27/2021    Aspirin/Antiplatelet Therapy 03/25/2022       Procedures     Future Appointments   Date Time Provider Department Center   5/18/2022  1:00 PM James Hughes MD [facility]   6/27/2022 10:15 AM Raymon Tyler MD [facility] TIMOTHY Diaz   7/11/2022 11:00 AM [facility] CV DEVICE CHECK [facility] [place] [facility]   10/10/2022  2:00 PM AWV NURSE, [facility] FAMILY MEDICINE [facility] TIMOTHY Diaz        Follow up in about 6 months (around 9/25/2022), or if symptoms worsen or fail to improve, for chronic health problems.     Signature:   Raymon Tyler MD  [facility], [address] [place] [address]  Phone #: [phone]  Fax #: [phone]    Date of encounter: 3/25/22    There are no Patient Instructions on file for this visit.

## 2022-04-08 PROBLEM — G89.29 CHRONIC RIGHT SHOULDER PAIN: Chronic | Status: ACTIVE | Noted: 2022-04-08

## 2022-04-08 PROBLEM — M25.511 CHRONIC RIGHT SHOULDER PAIN: Chronic | Status: ACTIVE | Noted: 2022-04-08

## 2022-04-08 PROBLEM — M19.011 OSTEOARTHRITIS OF RIGHT SHOULDER: Chronic | Status: ACTIVE | Noted: 2022-04-08

## 2022-04-27 RX ORDER — ATORVASTATIN CALCIUM 40 MG/1
40 TABLET, FILM COATED ORAL DAILY
Qty: 90 TABLET | Refills: 1 | Status: SHIPPED | OUTPATIENT
Start: 2022-04-27 | End: 2022-11-01

## 2022-06-10 ENCOUNTER — HOSPITAL ENCOUNTER (OUTPATIENT)
Dept: CARDIOLOGY | Facility: HOSPITAL | Age: 77
Discharge: HOME | End: 2022-06-10
Attending: INTERNAL MEDICINE
Payer: MEDICARE

## 2022-06-10 DIAGNOSIS — Z95.810 PRESENCE OF CARDIAC DEFIBRILLATOR: Primary | ICD-10-CM

## 2022-06-10 DIAGNOSIS — Z95.810 PRESENCE OF CARDIAC DEFIBRILLATOR: ICD-10-CM

## 2022-06-10 PROCEDURE — 93295 CARDIAC DEVICE CHECK - REMOTE: ICD-10-PCS | Mod: ,,, | Performed by: INTERNAL MEDICINE

## 2022-06-10 PROCEDURE — 93295 DEV INTERROG REMOTE 1/2/MLT: CPT | Mod: ,,, | Performed by: INTERNAL MEDICINE

## 2022-06-10 PROCEDURE — 93296 REM INTERROG EVL PM/IDS: CPT

## 2022-06-16 ENCOUNTER — OFFICE VISIT (OUTPATIENT)
Dept: CARDIOLOGY | Facility: CLINIC | Age: 77
End: 2022-06-16
Payer: MEDICARE

## 2022-06-16 VITALS
WEIGHT: 245 LBS | HEART RATE: 66 BPM | BODY MASS INDEX: 32.47 KG/M2 | HEIGHT: 73 IN | OXYGEN SATURATION: 96 % | DIASTOLIC BLOOD PRESSURE: 70 MMHG | SYSTOLIC BLOOD PRESSURE: 100 MMHG

## 2022-06-16 DIAGNOSIS — E78.2 MIXED HYPERLIPIDEMIA: Chronic | ICD-10-CM

## 2022-06-16 DIAGNOSIS — I25.5 ISCHEMIC CARDIOMYOPATHY: Chronic | ICD-10-CM

## 2022-06-16 DIAGNOSIS — J44.9 CHRONIC OBSTRUCTIVE PULMONARY DISEASE, UNSPECIFIED COPD TYPE: Chronic | ICD-10-CM

## 2022-06-16 DIAGNOSIS — I25.10 CORONARY ARTERY DISEASE INVOLVING NATIVE HEART WITHOUT ANGINA PECTORIS, UNSPECIFIED VESSEL OR LESION TYPE: Primary | Chronic | ICD-10-CM

## 2022-06-16 DIAGNOSIS — I10 HYPERTENSION, ESSENTIAL: Chronic | ICD-10-CM

## 2022-06-16 LAB
OHS CV DC PP MS1: 0.4 MS
OHS CV DC PP MS2: 0.4 MS
OHS CV DC PP V1: 1.5 V
OHS CV DC PP V2: 2 V

## 2022-06-16 PROCEDURE — 93010 EKG 12-LEAD: ICD-10-PCS | Mod: S$PBB,,, | Performed by: INTERNAL MEDICINE

## 2022-06-16 PROCEDURE — 93005 ELECTROCARDIOGRAM TRACING: CPT | Mod: PBBFAC | Performed by: INTERNAL MEDICINE

## 2022-06-16 PROCEDURE — 99214 OFFICE O/P EST MOD 30 MIN: CPT | Mod: S$PBB,,, | Performed by: INTERNAL MEDICINE

## 2022-06-16 PROCEDURE — 99214 PR OFFICE/OUTPT VISIT, EST, LEVL IV, 30-39 MIN: ICD-10-PCS | Mod: S$PBB,,, | Performed by: INTERNAL MEDICINE

## 2022-06-16 PROCEDURE — 99214 OFFICE O/P EST MOD 30 MIN: CPT | Mod: PBBFAC | Performed by: INTERNAL MEDICINE

## 2022-06-16 PROCEDURE — 93010 ELECTROCARDIOGRAM REPORT: CPT | Mod: S$PBB,,, | Performed by: INTERNAL MEDICINE

## 2022-06-20 NOTE — PROGRESS NOTES
[facility] note        DATE OF SERVICE: 06/20/2022       PCP: Raymon Tyler MD      CHIEF COMPLAINT:   Chief Complaint   Patient presents with    Edema     Some, goes down at night.        HISTORY OF PRESENT ILLNESS:  Tiny Gutierrez is a 77 y.o. male with a PMH of   Past Medical History:   Diagnosis Date    Aortic heart murmur     Asthma     Coronary artery disease     Emphysema/COPD     Hyperlipidemia     Hypertension     Ischemic cardiomyopathy     Mitral regurgitation     Old myocardial infarction 03/2001    Tricuspid regurgitation     Vitamin B 12 deficiency     Vitamin D deficiency      who presents for   Chief Complaint   Patient presents with    Edema     Some, goes down at night.          Review of Systems: Review of Systems   Respiratory: Negative for shortness of breath.    Cardiovascular: Positive for leg swelling. Negative for chest pain and palpitations.   Neurological: Negative for loss of consciousness.        PAST MEDICAL HISTORY:  Past Medical History:   Diagnosis Date    Aortic heart murmur     Asthma     Coronary artery disease     Emphysema/COPD     Hyperlipidemia     Hypertension     Ischemic cardiomyopathy     Mitral regurgitation     Old myocardial infarction 03/2001    Tricuspid regurgitation     Vitamin B 12 deficiency     Vitamin D deficiency        PAST SURGICAL HISTORY:  Past Surgical History:   Procedure Laterality Date    CARDIAC CATHETERIZATION      CARDIAC DEFIBRILLATOR PLACEMENT      CHOLECYSTECTOMY      CORONARY ARTERY BYPASS GRAFT      TOTAL KNEE ARTHROPLASTY Bilateral        SOCIAL HISTORY:  Social History     Socioeconomic History    Marital status:    Tobacco Use    Smoking status: Never Smoker    Smokeless tobacco: Never Used   Substance and Sexual Activity    Alcohol use: Never    Drug use: Never    Sexual activity: Yes       FAMILY HISTORY:  Family History   Problem Relation Age of Onset    Emphysema Father   "        ALLERGIES:  Review of patient's allergies indicates:   Allergen Reactions    Pollen extracts         MEDICATIONS:    Current Outpatient Medications:     albuterol (PROVENTIL) 2.5 mg /3 mL (0.083 %) nebulizer solution, 3 ml, Disp: , Rfl:     albuterol (PROVENTIL/VENTOLIN HFA) 90 mcg/actuation inhaler, 2 puffs as needed, Disp: , Rfl:     aspirin (ECOTRIN) 81 MG EC tablet, Take 81 mg by mouth once daily., Disp: , Rfl:     atorvastatin (LIPITOR) 40 MG tablet, Take 1 tablet (40 mg total) by mouth once daily., Disp: 90 tablet, Rfl: 1    BREO ELLIPTA 200-25 mcg/dose DsDv diskus inhaler, 1 puff once daily., Disp: , Rfl:     clopidogreL (PLAVIX) 75 mg tablet, Take 1 tablet (75 mg total) by mouth once daily., Disp: 90 tablet, Rfl: 3    furosemide (LASIX) 20 MG tablet, Take 1 tablet by mouth once daily, Disp: 90 tablet, Rfl: 0    leuprolide (ELIGARD) 7.5 mg (1 month) Syrg, as directed, Disp: , Rfl:     lisinopriL 10 MG tablet, Take 1 tablet (10 mg total) by mouth once daily., Disp: 90 tablet, Rfl: 3    omega 3-dha-epa-fish oil 1,000 mg (120 mg-180 mg) Cap, 1 capsule, Disp: , Rfl:     metoprolol succinate (TOPROL-XL) 25 MG 24 hr tablet, 1/2 tablet, Disp: , Rfl:     mometasone (NASONEX) 50 mcg/actuation nasal spray, 2 sprays in each nostril, Disp: , Rfl:     naproxen-diphenhydramine (ALEVE PM) 220-25 mg Tab, , Disp: , Rfl:     triamcinolone acetonide (NASACORT AQ NASL), , Disp: , Rfl:      PHYSICAL EXAM:  /70 (BP Location: Right arm, Patient Position: Sitting)   Pulse 66   Ht 6' 1" (1.854 m)   Wt 111.1 kg (245 lb)   SpO2 96%   BMI 32.32 kg/m²   Wt Readings from Last 3 Encounters:   06/16/22 111.1 kg (245 lb)   03/25/22 111.1 kg (245 lb)   11/17/21 108.4 kg (239 lb)      Body mass index is 32.32 kg/m².    Physical Exam  Vitals reviewed.   Constitutional:       Appearance: Normal appearance.   HENT:      Head: Normocephalic and atraumatic.   Neck:      Vascular: No carotid bruit or JVD. "   Cardiovascular:      Rate and Rhythm: Normal rate and regular rhythm.      Pulses: Normal pulses.           Radial pulses are 2+ on the right side and 2+ on the left side.        Dorsalis pedis pulses are 2+ on the left side.      Heart sounds: Murmur heard.    Systolic murmur is present with a grade of 2/6.     Comments: II/ VI HSM   Pulmonary:      Effort: Pulmonary effort is normal.      Breath sounds: Normal breath sounds.   Musculoskeletal:      Right lower leg: No edema.      Left lower leg: No edema.   Skin:     General: Skin is warm and dry.   Neurological:      Mental Status: He is alert.         LABS REVIEWED:  Lab Results   Component Value Date    WBC 6.74 10/27/2021    RBC 4.40 (L) 10/27/2021    HGB 13.5 10/27/2021    HCT 40.6 10/27/2021    MCV 92.3 10/27/2021    MCH 30.7 10/27/2021    MCHC 33.3 10/27/2021    RDW 13.9 10/27/2021     10/27/2021    MPV 9.2 (L) 10/27/2021    NRBC 0.0 10/27/2021     Lab Results   Component Value Date     10/27/2021    K 4.2 10/27/2021     (H) 10/27/2021    CO2 31 10/27/2021    BUN 22 (H) 10/27/2021     Lab Results   Component Value Date    AST 46 (H) 10/27/2021    ALT 78 (H) 10/27/2021     Lab Results   Component Value Date    GLU 93 10/27/2021     Lab Results   Component Value Date    CHOL 185 10/27/2021    HDL 54 10/27/2021    TRIG 97 10/27/2021    CHOLHDL 3.4 10/27/2021       CARDIAC STUDIES REVIEWED:EKG: atrial-paced rhythm, 56 bpm        ASSESSMENT:  Doing well.   Active Problem List with Overview Notes    Diagnosis Date Noted    Chronic right shoulder pain 04/08/2022    Osteoarthritis of right shoulder 04/08/2022    Aortic heart murmur 11/19/2021    Chronic obstructive pulmonary disease 11/19/2021    Ischemic cardiomyopathy 10/26/2021    Coronary artery disease involving native heart without angina pectoris 10/26/2021    Fatigue 07/29/2021    Abnormal kidney function 07/29/2021    Hyperlipidemia     Knee pain 06/04/2021    Prosthetic  joint infection 06/04/2021    Gastroesophageal reflux disease 05/27/2021    Back pain 05/27/2021    Fever 05/27/2021    Prostatitis 05/27/2021    Hypertension, essential      VISIT DIAGNOSIS:  Coronary artery disease involving native heart without angina pectoris, unspecified vessel or lesion type  Comments:  s/p anterior MI 3/01  Orders:  -     EKG 12-lead; Future    Hypertension, essential    Ischemic cardiomyopathy  Comments:  s/p ICD    Mixed hyperlipidemia    Chronic obstructive pulmonary disease, unspecified COPD type         PLAN:  Orders Placed This Encounter   Procedures    EKG 12-lead     Standing Status:   Future     Number of Occurrences:   1     Standing Expiration Date:   6/16/2023      RTC 6 months.

## 2022-06-27 ENCOUNTER — OFFICE VISIT (OUTPATIENT)
Dept: FAMILY MEDICINE | Facility: CLINIC | Age: 77
End: 2022-06-27
Payer: MEDICARE

## 2022-06-27 VITALS
TEMPERATURE: 98 F | SYSTOLIC BLOOD PRESSURE: 104 MMHG | HEIGHT: 73 IN | HEART RATE: 72 BPM | OXYGEN SATURATION: 98 % | RESPIRATION RATE: 16 BRPM | WEIGHT: 247.19 LBS | BODY MASS INDEX: 32.76 KG/M2 | DIASTOLIC BLOOD PRESSURE: 66 MMHG

## 2022-06-27 DIAGNOSIS — E78.2 MIXED HYPERLIPIDEMIA: Chronic | ICD-10-CM

## 2022-06-27 DIAGNOSIS — K21.9 GASTROESOPHAGEAL REFLUX DISEASE WITHOUT ESOPHAGITIS: Primary | Chronic | ICD-10-CM

## 2022-06-27 DIAGNOSIS — C61 PROSTATE CANCER: Chronic | ICD-10-CM

## 2022-06-27 DIAGNOSIS — I10 HYPERTENSION, ESSENTIAL: Chronic | ICD-10-CM

## 2022-06-27 DIAGNOSIS — J44.9 CHRONIC OBSTRUCTIVE PULMONARY DISEASE, UNSPECIFIED COPD TYPE: Chronic | ICD-10-CM

## 2022-06-27 DIAGNOSIS — I25.5 ISCHEMIC CARDIOMYOPATHY: Chronic | ICD-10-CM

## 2022-06-27 PROBLEM — N41.9 PROSTATITIS: Status: RESOLVED | Noted: 2021-05-27 | Resolved: 2022-06-27

## 2022-06-27 PROBLEM — N28.9 ABNORMAL KIDNEY FUNCTION: Chronic | Status: ACTIVE | Noted: 2021-07-29

## 2022-06-27 PROBLEM — I25.10 CORONARY ARTERY DISEASE INVOLVING NATIVE HEART WITHOUT ANGINA PECTORIS: Chronic | Status: ACTIVE | Noted: 2021-10-26

## 2022-06-27 PROCEDURE — 99214 OFFICE O/P EST MOD 30 MIN: CPT | Mod: ,,, | Performed by: FAMILY MEDICINE

## 2022-06-27 PROCEDURE — 99214 PR OFFICE/OUTPT VISIT, EST, LEVL IV, 30-39 MIN: ICD-10-PCS | Mod: ,,, | Performed by: FAMILY MEDICINE

## 2022-06-27 NOTE — ASSESSMENT & PLAN NOTE
He is being managed Dr. Diaz, [facility] in [place], and Dr. Pinedo at [facility] in [place]

## 2022-06-27 NOTE — PROGRESS NOTES
Raymon Tyler MD    [address] [place] [address]     PATIENT NAME: Tiny Gutierrez  : 1945  DATE: 22  MRN: [id number]      Billing Provider: Raymon Tyler MD  Level of Service:   Patient PCP Information     Provider PCP Type    Raymon Tyler MD General          Reason for Visit / Chief Complaint: Follow-up (3 month follow up )       Update PCP  Update Chief Complaint         History of Present Illness / Problem Focused Workflow     Tiny Gutierrez presents to the clinic with Follow-up (3 month follow up )     Being worked up and treated for Prostate Cancer    Overall he feels stable, his breathing is chronically not great, has CHF and chronic mild hypoxemia     HPI    Review of Systems     Review of Systems   Constitutional: Negative for activity change, appetite change, chills, fatigue and fever.   HENT: Negative for nasal congestion, ear pain, hearing loss, postnasal drip and sore throat.    Respiratory: Negative for cough, chest tightness, shortness of breath and wheezing.    Cardiovascular: Negative for chest pain, palpitations, leg swelling and claudication.   Gastrointestinal: Negative for abdominal pain, change in bowel habit, constipation, diarrhea, nausea, vomiting and change in bowel habit.   Genitourinary: Negative for dysuria.   Musculoskeletal: Negative for arthralgias, back pain, gait problem and myalgias.   Integumentary:  Negative for rash.   Neurological: Negative for weakness and headaches.   Psychiatric/Behavioral: Negative for suicidal ideas. The patient is not nervous/anxious.         Medical / Social / Family History     Past Medical History:   Diagnosis Date    Aortic heart murmur     Asthma     Emphysema/COPD     Hyperlipidemia     Ischemic cardiomyopathy     Mitral regurgitation     Old myocardial infarction 2001    Tricuspid regurgitation     Vitamin B 12 deficiency     Vitamin D deficiency         Past Surgical History:   Procedure Laterality Date    CARDIAC CATHETERIZATION      CARDIAC DEFIBRILLATOR PLACEMENT      CHOLECYSTECTOMY      CORONARY ARTERY BYPASS GRAFT      TOTAL KNEE ARTHROPLASTY Bilateral        Social History    reports that he has never smoked. He has never used smokeless tobacco. He reports that he does not drink alcohol and does not use drugs.    Family History  's family history includes Emphysema in his father.    Medications and Allergies     Medications  Outpatient Medications Marked as Taking for the 6/27/22 encounter (Office Visit) with Raymon Tyler MD   Medication Sig Dispense Refill    albuterol (PROVENTIL) 2.5 mg /3 mL (0.083 %) nebulizer solution 3 ml      albuterol (PROVENTIL/VENTOLIN HFA) 90 mcg/actuation inhaler 2 puffs as needed      aspirin (ECOTRIN) 81 MG EC tablet Take 81 mg by mouth once daily.      atorvastatin (LIPITOR) 40 MG tablet Take 1 tablet (40 mg total) by mouth once daily. 90 tablet 1    BREO ELLIPTA 200-25 mcg/dose DsDv diskus inhaler 1 puff once daily.      clopidogreL (PLAVIX) 75 mg tablet Take 1 tablet (75 mg total) by mouth once daily. 90 tablet 3    furosemide (LASIX) 20 MG tablet Take 1 tablet by mouth once daily 90 tablet 0    leuprolide (ELIGARD) 7.5 mg (1 month) Syrg as directed      lisinopriL 10 MG tablet Take 1 tablet (10 mg total) by mouth once daily. 90 tablet 3    omega 3-dha-epa-fish oil 1,000 mg (120 mg-180 mg) Cap 1 capsule      triamcinolone acetonide (NASACORT AQ NASL)          Allergies  Review of patient's allergies indicates:   Allergen Reactions    Pollen extracts        Physical Examination     Vitals:    06/27/22 1015   BP: 104/66   Pulse: 72   Resp: 16   Temp: 98 °F (36.7 °C)     Physical Exam  Vitals and nursing note reviewed.   Constitutional:       General: He is not in acute distress.     Appearance: Normal appearance. He is not ill-appearing.   Eyes:      Extraocular Movements: Extraocular  movements intact.      Pupils: Pupils are equal, round, and reactive to light.   Cardiovascular:      Rate and Rhythm: Normal rate and regular rhythm.      Heart sounds: Normal heart sounds.   Pulmonary:      Effort: Pulmonary effort is normal.      Breath sounds: Normal breath sounds.   Abdominal:      General: Bowel sounds are normal.      Palpations: Abdomen is soft.   Musculoskeletal:         General: Normal range of motion.   Skin:     Findings: No rash.   Neurological:      General: No focal deficit present.      Mental Status: He is alert and oriented to person, place, and time. Mental status is at baseline.   Psychiatric:         Mood and Affect: Mood normal.         Behavior: Behavior normal.          Assessment and Plan (including Health Maintenance)      Problem List  Smart Sets  Document Outside HM   :    Plan:         Health Maintenance Due   Topic Date Due    Hepatitis C Screening  Never done    COVID-19 Vaccine (4 - Booster for Moderna series) 03/22/2022       Problem List Items Addressed This Visit        Pulmonary    Chronic obstructive pulmonary disease (Chronic)       Cardiac/Vascular    Hypertension, essential (Chronic)    Hyperlipidemia (Chronic)    Ischemic cardiomyopathy (Chronic)       Oncology    Prostate cancer (Chronic)    Current Assessment & Plan     He is being managed Dr. Diaz, [facility] in [place], and Dr. Pinedo at [facility]              GI    Gastroesophageal reflux disease - Primary (Chronic)        Gastroesophageal reflux disease without esophagitis    Hypertension, essential    Ischemic cardiomyopathy    Mixed hyperlipidemia    Chronic obstructive pulmonary disease, unspecified COPD type    Prostate cancer       Health Maintenance Topics with due status: Not Due       Topic Last Completion Date    Lipid Panel 10/27/2021    Aspirin/Antiplatelet Therapy 06/27/2022       Procedures     Future Appointments   Date Time Provider Department  Center   9/12/2022 10:00 AM [facility] CV DEVICE CHECK [facility] CRDDIA [place] [facility]   10/10/2022  2:00 PM AWV NURSE, [facility] FAMILY MEDICINE [facility] PERNELL[place]PA [place]   1/11/2023 10:15 AM James Hughes MD [facility] C[facility]        Follow up in about 3 months (around 9/27/2022) for chronic health problems.     Signature:  Raymon Tyler MD  [address] [place] [address]  Phone #: [phone]  Fax #: [phone]    Date of encounter: 6/27/22    There are no Patient Instructions on file for this visit.

## 2022-09-12 ENCOUNTER — HOSPITAL ENCOUNTER (OUTPATIENT)
Dept: CARDIOLOGY | Facility: HOSPITAL | Age: 77
Discharge: HOME | End: 2022-09-12
Attending: INTERNAL MEDICINE
Payer: MEDICARE

## 2022-09-12 DIAGNOSIS — Z95.810 PRESENCE OF CARDIAC DEFIBRILLATOR: ICD-10-CM

## 2022-09-12 PROCEDURE — 93283 PRGRMG EVAL IMPLANTABLE DFB: CPT

## 2022-09-12 PROCEDURE — 93283 CARDIAC DEVICE CHECK - IN CLINIC & HOSPITAL: ICD-10-PCS | Mod: 26,,, | Performed by: INTERNAL MEDICINE

## 2022-09-12 PROCEDURE — 93283 PRGRMG EVAL IMPLANTABLE DFB: CPT | Mod: 26,,, | Performed by: INTERNAL MEDICINE

## 2022-09-13 LAB
BATTERY VOLTAGE (V): 2.98 V
OHS CV DC PP MS1: 0.4 MS
OHS CV DC PP MS2: 0.4 MS
OHS CV DC PP V1: 1.5 V
OHS CV DC PP V2: 2.25 V

## 2022-10-09 DIAGNOSIS — Z71.89 COMPLEX CARE COORDINATION: ICD-10-CM

## 2022-10-10 ENCOUNTER — OFFICE VISIT (OUTPATIENT)
Dept: FAMILY MEDICINE | Facility: CLINIC | Age: 77
End: 2022-10-10
Payer: MEDICARE

## 2022-10-10 VITALS
DIASTOLIC BLOOD PRESSURE: 60 MMHG | SYSTOLIC BLOOD PRESSURE: 115 MMHG | HEART RATE: 62 BPM | BODY MASS INDEX: 32.71 KG/M2 | WEIGHT: 246.81 LBS | TEMPERATURE: 99 F | RESPIRATION RATE: 18 BRPM | OXYGEN SATURATION: 98 % | HEIGHT: 73 IN

## 2022-10-10 DIAGNOSIS — I10 HYPERTENSION, ESSENTIAL: Chronic | ICD-10-CM

## 2022-10-10 DIAGNOSIS — R53.82 CHRONIC FATIGUE: Chronic | ICD-10-CM

## 2022-10-10 DIAGNOSIS — E78.2 MIXED HYPERLIPIDEMIA: Chronic | ICD-10-CM

## 2022-10-10 DIAGNOSIS — Z00.00 ENCOUNTER FOR SUBSEQUENT ANNUAL WELLNESS VISIT (AWV) IN MEDICARE PATIENT: Primary | ICD-10-CM

## 2022-10-10 DIAGNOSIS — M54.9 BACK PAIN, UNSPECIFIED BACK LOCATION, UNSPECIFIED BACK PAIN LATERALITY, UNSPECIFIED CHRONICITY: Chronic | ICD-10-CM

## 2022-10-10 DIAGNOSIS — C61 PROSTATE CANCER: Chronic | ICD-10-CM

## 2022-10-10 DIAGNOSIS — N28.9 ABNORMAL KIDNEY FUNCTION: Chronic | ICD-10-CM

## 2022-10-10 DIAGNOSIS — I35.8 AORTIC HEART MURMUR: Chronic | ICD-10-CM

## 2022-10-10 DIAGNOSIS — J44.9 CHRONIC OBSTRUCTIVE PULMONARY DISEASE, UNSPECIFIED COPD TYPE: Chronic | ICD-10-CM

## 2022-10-10 DIAGNOSIS — K21.9 GASTROESOPHAGEAL REFLUX DISEASE WITHOUT ESOPHAGITIS: Chronic | ICD-10-CM

## 2022-10-10 DIAGNOSIS — I25.5 ISCHEMIC CARDIOMYOPATHY: Chronic | ICD-10-CM

## 2022-10-10 DIAGNOSIS — M19.011 OSTEOARTHRITIS OF RIGHT SHOULDER, UNSPECIFIED OSTEOARTHRITIS TYPE: Chronic | ICD-10-CM

## 2022-10-10 PROCEDURE — G0439 PPPS, SUBSEQ VISIT: HCPCS | Mod: ,,, | Performed by: FAMILY MEDICINE

## 2022-10-10 PROCEDURE — G0439 PR MEDICARE ANNUAL WELLNESS SUBSEQUENT VISIT: ICD-10-PCS | Mod: ,,, | Performed by: FAMILY MEDICINE

## 2022-10-10 NOTE — PROGRESS NOTES
MARKS LAIRD   [facility]      PATIENT NAME: Tiny Gutierrez   : 1945    AGE: 77 y.o. DATE: 10/19/2022   MRN: [id number]        Reason for Visit / Chief Complaint: Medicare AWV (Subsequent Medicare AWV )        Tiny Gutierrez presents for a Subsequent Medicare AWV today.     The following components were reviewed and updated:    Medical/Social/Family History:  Past Medical History:   Diagnosis Date    Aortic heart murmur     Asthma     Cancer     Emphysema/COPD     Hyperlipidemia     Ischemic cardiomyopathy     Mitral regurgitation     Old myocardial infarction 2001    Tricuspid regurgitation     Vitamin B 12 deficiency     Vitamin D deficiency         Family History   Problem Relation Age of Onset    Emphysema Father         Social History     Tobacco Use   Smoking Status Never   Smokeless Tobacco Never      Social History     Substance and Sexual Activity   Alcohol Use Never       Family History   Problem Relation Age of Onset    Emphysema Father        Past Surgical History:   Procedure Laterality Date    CARDIAC CATHETERIZATION      CARDIAC DEFIBRILLATOR PLACEMENT      CHOLECYSTECTOMY      CORONARY ARTERY BYPASS GRAFT      TOTAL KNEE ARTHROPLASTY Bilateral          Allergies and Current Medications     Review of patient's allergies indicates:   Allergen Reactions    Pollen extracts        Current Outpatient Medications:     albuterol (PROVENTIL) 2.5 mg /3 mL (0.083 %) nebulizer solution, 3 ml, Disp: , Rfl:     albuterol (PROVENTIL/VENTOLIN HFA) 90 mcg/actuation inhaler, 2 puffs as needed, Disp: , Rfl:     aspirin (ECOTRIN) 81 MG EC tablet, Take 81 mg by mouth once daily., Disp: , Rfl:     atorvastatin (LIPITOR) 40 MG tablet, Take 1 tablet (40 mg total) by mouth once daily., Disp: 90 tablet, Rfl: 1    BREO ELLIPTA 200-25 mcg/dose DsDv diskus inhaler, 1 puff once daily., Disp: , Rfl:     calcium carbonate/vitamin D3 (CALTRATE 600 + D ORAL), Take 1 capsule by mouth  Daily., Disp: , Rfl:     clopidogreL (PLAVIX) 75 mg tablet, Take 1 tablet (75 mg total) by mouth once daily., Disp: 90 tablet, Rfl: 3    furosemide (LASIX) 20 MG tablet, Take 1 tablet by mouth once daily, Disp: 90 tablet, Rfl: 0    leuprolide (ELIGARD) 7.5 mg (1 month) Syrg, as directed, Disp: , Rfl:     metoprolol succinate (TOPROL-XL) 25 MG 24 hr tablet, 1/2 tablet, Disp: , Rfl:     naproxen-diphenhydramine (ALEVE PM) 220-25 mg Tab, , Disp: , Rfl:     omega 3-dha-epa-fish oil 1,000 mg (120 mg-180 mg) Cap, 1 capsule, Disp: , Rfl:     omeprazole (PRILOSEC) 20 MG capsule, TAKE 1 CAPSULE BY MOUTH ONCE DAILY FOR STOMACH, Disp: 90 capsule, Rfl: 1    triamcinolone acetonide (NASACORT AQ NASL), , Disp: , Rfl:     mometasone (NASONEX) 50 mcg/actuation nasal spray, 2 sprays in each nostril, Disp: , Rfl:     Health Risk Assessment   Fall Risk: No   Advance Directive:  Does not have an advanced directive. Verbal education and written education included in today's AVS.   Depression: PHQ9 score: 5    HTN: DASH diet, exercise, weight management, med compliance, home BP monitoring, and follow-up discussed.   T2DM: No   Tobacco use: No  STI: Not at risk    Alcohol misuse: No   Statin Use: Yes    Opioid Risk Score         Value Time User    Opioid Risk Score  3 10/10/2022  1:53 PM Rochelle Rodriguez RN               Health Risk Assessment  What [age] your [age] 70-79  Are you male or female?: Male  During the past four weeks, how much have you been bothered by emotional problems such as feeling anxious, depressed, irritable, sad, or downhearted and blue?: Slightly  During the past five weeks, has your physical and/or emotional health limited your social activities with family, friends, neighbors, or groups?: Not at all  During the past four weeks, how much bodily pain have you generally had?: Mild pain  During the past four weeks, was someone available to help if you needed and wanted help?: Yes, as much as I wanted  During the past  four weeks, what was the hardest physical activity you could do for at least two minutes?: Light  Can you get to places out of walking distance without help?  (For example, can you travel alone on buses or taxis, or drive your own car?): Yes  Can you go shopping for groceries or clothes without someone's help?: Yes  Can you prepare your own meals?: Yes  Can you do your own housework without help?: Yes  Because of any health problems, do you need the help of another person with your personal care needs such as eating, bathing, dressing, or getting around the house?: Yes  Can you handle your own money without help?: No  During the past four weeks, how would you rate your health in general?: Fair  How have things been going for you during the past four weeks?: Pretty well  Are you having difficulties driving your car?: No  Do you always fasten your seat belt when you are in a car?: Yes, usually  How often in the past four weeks have you been bothered by falling or dizzy when standing up?: Never  How often in the past four weeks have you been bothered by sexual problems?: Sometimes  How often in the past four weeks have you been bothered by trouble eating well?: Never  How often in the past four weeks have you been bothered by teeth or denture problems?: Never  How often in the past four weeks have you been bothered with problems using the telephone?: Never  How often in the past four weeks have you been bothered by tiredness or fatigue?: Always  Have you fallen two or more times in the past year?: No  Are you afraid of falling?: No  Are you a smoker?: No  During the past four weeks, how many drinks of wine, beer, or other alcoholic beverages did you have?: No alcohol at all  Do you exercise for about 20 minutes three or more days a week?: Yes, most of the time  Have you been given any information to help you with hazards in your house that might hurt you?: Yes  Have you been given any information to help you with  keeping track of your medications?: Yes  How often do you have trouble taking medicines the way you've been told to take them?: I always take them as prescribed  How confident are you that you can control and manage most of your health problems?: Very confident  What is your race? (Check all that apply.):     Health Maintenance       Health Maintenance Topics with due status: Not Due       Topic Last Completion Date    Colonoscopy 06/30/2014    Lipid Panel 10/27/2021    Aspirin/Antiplatelet Therapy 10/10/2022     Health Maintenance Due   Topic Date Due    Hepatitis C Screening  Never done       Incontinence  Bowel: No  Bladder: No    Lab results available in Epic or see dates from [facility] above:   Lab Results   Component Value Date    CHOL 185 10/27/2021    CHOL 119 08/18/2020     Lab Results   Component Value Date    HDL 54 10/27/2021    HDL 44 08/18/2020     Lab Results   Component Value Date    LDLCALC 112 10/27/2021    LDLCALC 57 08/18/2020     Lab Results   Component Value Date    TRIG 97 10/27/2021    TRIG 90 08/18/2020     Lab Results   Component Value Date    CHOLHDL 3.4 10/27/2021    CHOLHDL 2.7 08/18/2020       No results found for: LABA1C, HGBA1C    Sodium   Date Value Ref Range Status   10/27/2021 140 136 - 145 mmol/L Final     Potassium   Date Value Ref Range Status   10/27/2021 4.2 3.5 - 5.1 mmol/L Final     Chloride   Date Value Ref Range Status   10/27/2021 108 (H) 98 - 107 mmol/L Final     CO2   Date Value Ref Range Status   10/27/2021 31 21 - 32 mmol/L Final     Glucose   Date Value Ref Range Status   10/27/2021 93 74 - 106 mg/dL Final     BUN   Date Value Ref Range Status   10/27/2021 22 (H) 7 - 18 mg/dL Final     Creatinine   Date Value Ref Range Status   10/27/2021 1.13 0.70 - 1.30 mg/dL Final     Calcium   Date Value Ref Range Status   10/27/2021 9.6 8.5 - 10.1 mg/dL Final     Total Protein   Date Value Ref Range Status   10/27/2021 7.2 6.4 - 8.2 g/dL Final     Albumin   Date Value Ref  "Range Status   10/27/2021 3.5 3.5 - 5.0 g/dL Final     Bilirubin, Total   Date Value Ref Range Status   10/27/2021 0.6 0.0 - 1.2 mg/dL Final     Alk Phos   Date Value Ref Range Status   10/27/2021 141 (H) 45 - 115 U/L Final     AST   Date Value Ref Range Status   10/27/2021 46 (H) 15 - 37 U/L Final     ALT   Date Value Ref Range Status   10/27/2021 78 (H) 16 - 61 U/L Final     Anion Gap   Date Value Ref Range Status   10/27/2021 5 (L) 7 - 16 mmol/L Final     eGFR   Date Value Ref Range Status   10/27/2021 67 >=60 mL/min/1.73m² Final         Lab Results   Component Value Date    PSA 42.400 (H) 07/29/2021    PSA 39.700 (H) 05/27/2021           Care Team   PCP:          **See Completed Assessments for Annual Wellness visit within the encounter summary    The following assessments were completed & reviewed:  Depression Screening  Cognitive function Screening  Timed Get Up Test  Whisper Test  Vision Screen  Health Risk Assessment  Checklist of ADLs and IADLs      Objective  Vitals:    10/10/22 1339   BP: 115/60   Pulse: 62   Resp: 18   Temp: 98.6 °F (37 °C)   TempSrc: Oral   SpO2: 98%   Weight: 111.9 kg (246 lb 12.8 oz)   Height: 6' 1" (1.854 m)   PainSc: 0-No pain      Body mass index is 32.56 kg/m².  Ideal body weight: 79.9 kg (176 lb 2.4 oz)       Physical Exam  Vitals and nursing note reviewed.   Constitutional:       General: He is not in acute distress.     Appearance: Normal appearance. He is not ill-appearing.   Eyes:      Extraocular Movements: Extraocular movements intact.      Pupils: Pupils are equal, round, and reactive to light.   Cardiovascular:      Rate and Rhythm: Normal rate and regular rhythm.      Heart sounds: Normal heart sounds.   Pulmonary:      Effort: Pulmonary effort is normal.      Breath sounds: Normal breath sounds.   Abdominal:      General: Bowel sounds are normal.      Palpations: Abdomen is soft.   Musculoskeletal:         General: Normal range of motion.   Skin:     Findings: No " rash.   Neurological:      General: No focal deficit present.      Mental Status: He is alert and oriented to person, place, and time. Mental status is at baseline.   Psychiatric:         Mood and Affect: Mood normal.         Behavior: Behavior normal.         Assessment:     1. Encounter for subsequent annual wellness visit (AWV) in Medicare patient    2. BMI 32.0-32.9,adult    3. Chronic obstructive pulmonary disease, unspecified COPD type    4. Ischemic cardiomyopathy    5. Hypertension, essential    6. Mixed hyperlipidemia    7. Aortic heart murmur    8. Abnormal kidney function    9. Prostate cancer    10. Gastroesophageal reflux disease without esophagitis    11. Osteoarthritis of right shoulder, unspecified osteoarthritis type    12. Back pain, unspecified back location, unspecified back pain laterality, unspecified chronicity    13. Chronic fatigue       Plan:    Referrals:        Advised to call office if does not hear from anyone with referral appt within 2-3 weeks to check on status of referral. Voiced understanding.      Discussed and provided with a screening schedule and personal prevention plan in accordance with USPSTF age appropriate recommendations and Medicare screening guidelines.   Education, counseling, and referrals were provided as needed.  After Visit Summary printed and given to patient which includes written education and a list of any referrals if indicated.     Education including advanced directives, diet, exercise, falls, and preventive health discussed with patient and patient verbalized understanding.      F/u plan for yearly AWV.    Signature:  Raymon Tyler MD

## 2022-10-10 NOTE — PATIENT INSTRUCTIONS
Counseling and Referral of Other Preventative  (Italic type indicates deductible and co-insurance are waived)    Patient Name: Tiny Gutierrez  Today's Date: 10/10/2022    Health Maintenance         Date Due Completion Date    Colonoscopy Never done ---    Hepatitis C Screening 10/10/2022 (Originally 1945) ---    TETANUS VACCINE 06/27/2023 (Originally 9/17/2012) 9/17/2002    Shingles Vaccine (1 of 2) 06/27/2023 (Originally 6/5/1962) ---    COVID-19 Vaccine (4 - Booster for Moderna series) 10/10/2023 (Originally 1/17/2022) 11/22/2021    Lipid Panel 10/27/2022 10/27/2021    Aspirin/Antiplatelet Therapy 06/27/2023 6/27/2022          No orders of the defined types were placed in this encounter.

## 2022-10-19 PROBLEM — R53.83 FATIGUE: Chronic | Status: ACTIVE | Noted: 2021-07-29

## 2022-10-19 PROBLEM — M54.9 BACK PAIN: Chronic | Status: ACTIVE | Noted: 2021-05-27

## 2022-11-01 ENCOUNTER — HOSPITAL ENCOUNTER (OUTPATIENT)
Dept: RADIOLOGY | Facility: HOSPITAL | Age: 77
Discharge: HOME | End: 2022-11-01
Attending: FAMILY MEDICINE
Payer: MEDICARE

## 2022-11-01 ENCOUNTER — OFFICE VISIT (OUTPATIENT)
Dept: FAMILY MEDICINE | Facility: CLINIC | Age: 77
End: 2022-11-01
Payer: MEDICARE

## 2022-11-01 VITALS
RESPIRATION RATE: 16 BRPM | HEIGHT: 73 IN | SYSTOLIC BLOOD PRESSURE: 111 MMHG | HEART RATE: 58 BPM | BODY MASS INDEX: 32.82 KG/M2 | WEIGHT: 247.63 LBS | OXYGEN SATURATION: 98 % | DIASTOLIC BLOOD PRESSURE: 61 MMHG

## 2022-11-01 DIAGNOSIS — M54.50 CHRONIC RIGHT-SIDED LOW BACK PAIN WITHOUT SCIATICA: ICD-10-CM

## 2022-11-01 DIAGNOSIS — Z95.1 STATUS POST CORONARY ARTERY BYPASS GRAFT: Chronic | ICD-10-CM

## 2022-11-01 DIAGNOSIS — G89.29 CHRONIC RIGHT-SIDED LOW BACK PAIN WITHOUT SCIATICA: ICD-10-CM

## 2022-11-01 DIAGNOSIS — M60.9 MYOSITIS OF OTHER SITE, UNSPECIFIED MYOSITIS TYPE: Primary | ICD-10-CM

## 2022-11-01 DIAGNOSIS — R53.82 CHRONIC FATIGUE: Chronic | ICD-10-CM

## 2022-11-01 DIAGNOSIS — G89.29 CHRONIC RIGHT-SIDED LOW BACK PAIN WITHOUT SCIATICA: Chronic | ICD-10-CM

## 2022-11-01 DIAGNOSIS — I35.8 AORTIC HEART MURMUR: Chronic | ICD-10-CM

## 2022-11-01 DIAGNOSIS — I10 HYPERTENSION, ESSENTIAL: Chronic | ICD-10-CM

## 2022-11-01 DIAGNOSIS — I25.10 CORONARY ARTERY DISEASE INVOLVING NATIVE HEART WITHOUT ANGINA PECTORIS, UNSPECIFIED VESSEL OR LESION TYPE: Chronic | ICD-10-CM

## 2022-11-01 DIAGNOSIS — M54.50 CHRONIC RIGHT-SIDED LOW BACK PAIN WITHOUT SCIATICA: Chronic | ICD-10-CM

## 2022-11-01 DIAGNOSIS — M62.830 MUSCLE SPASM OF BACK: ICD-10-CM

## 2022-11-01 PROBLEM — H61.23 IMPACTED CERUMEN OF BOTH EARS: Status: ACTIVE | Noted: 2022-11-01

## 2022-11-01 PROBLEM — I25.5 GENERALIZED ISCHEMIC MYOCARDIAL DYSFUNCTION: Status: ACTIVE | Noted: 2022-11-01

## 2022-11-01 PROBLEM — E66.9 OBESITY WITH BODY MASS INDEX 30 OR GREATER: Status: ACTIVE | Noted: 2022-11-01

## 2022-11-01 PROBLEM — Z99.81 OXYGEN DEPENDENT: Status: ACTIVE | Noted: 2022-11-01

## 2022-11-01 PROBLEM — Z99.81 OXYGEN DEPENDENT: Chronic | Status: ACTIVE | Noted: 2022-11-01

## 2022-11-01 PROBLEM — J45.909 ASTHMA: Status: ACTIVE | Noted: 2022-11-01

## 2022-11-01 PROBLEM — Z78.9 NON-SMOKER: Status: ACTIVE | Noted: 2022-11-01

## 2022-11-01 PROBLEM — J45.909 ASTHMA: Chronic | Status: RESOLVED | Noted: 2022-11-01 | Resolved: 2022-11-01

## 2022-11-01 PROBLEM — J45.40 MODERATE PERSISTENT ASTHMA: Status: ACTIVE | Noted: 2022-11-01

## 2022-11-01 PROBLEM — J45.40 MODERATE PERSISTENT ASTHMA: Chronic | Status: ACTIVE | Noted: 2022-11-01

## 2022-11-01 PROBLEM — J45.909 ASTHMA: Chronic | Status: ACTIVE | Noted: 2022-11-01

## 2022-11-01 PROBLEM — Z96.659 ARTIFICIAL KNEE JOINT PRESENT: Status: ACTIVE | Noted: 2022-11-01

## 2022-11-01 LAB
ALBUMIN SERPL BCP-MCNC: 3.3 G/DL (ref 3.5–5)
ALBUMIN/GLOB SERPL: 1 {RATIO}
ALP SERPL-CCNC: 440 U/L (ref 45–115)
ALT SERPL W P-5'-P-CCNC: 107 U/L (ref 16–61)
ANION GAP SERPL CALCULATED.3IONS-SCNC: 10 MMOL/L (ref 7–16)
AST SERPL W P-5'-P-CCNC: 73 U/L (ref 15–37)
BASOPHILS # BLD AUTO: 0.02 K/UL (ref 0–0.2)
BASOPHILS NFR BLD AUTO: 0.4 % (ref 0–1)
BILIRUB SERPL-MCNC: 0.5 MG/DL (ref ?–1.2)
BILIRUB UR QL STRIP: NEGATIVE
BUN SERPL-MCNC: 17 MG/DL (ref 7–18)
BUN/CREAT SERPL: 15 (ref 6–20)
CALCIUM SERPL-MCNC: 9.3 MG/DL (ref 8.5–10.1)
CHLORIDE SERPL-SCNC: 107 MMOL/L (ref 98–107)
CLARITY UR: CLEAR
CO2 SERPL-SCNC: 28 MMOL/L (ref 21–32)
COLOR UR: YELLOW
CREAT SERPL-MCNC: 1.14 MG/DL (ref 0.7–1.3)
DIFFERENTIAL METHOD BLD: ABNORMAL
EGFR (NO RACE VARIABLE) (RUSH/TITUS): 65 ML/MIN/1.73M²
EOSINOPHIL # BLD AUTO: 0.2 K/UL (ref 0–0.5)
EOSINOPHIL NFR BLD AUTO: 4.2 % (ref 1–4)
ERYTHROCYTE [DISTWIDTH] IN BLOOD BY AUTOMATED COUNT: 13.8 % (ref 11.5–14.5)
GLOBULIN SER-MCNC: 3.3 G/DL (ref 2–4)
GLUCOSE SERPL-MCNC: 110 MG/DL (ref 74–106)
GLUCOSE UR STRIP-MCNC: NORMAL MG/DL
HCT VFR BLD AUTO: 36.2 % (ref 40–54)
HGB BLD-MCNC: 11.6 G/DL (ref 13.5–18)
IMM GRANULOCYTES # BLD AUTO: 0.01 K/UL (ref 0–0.04)
IMM GRANULOCYTES NFR BLD: 0.2 % (ref 0–0.4)
KETONES UR STRIP-SCNC: NEGATIVE MG/DL
LEUKOCYTE ESTERASE UR QL STRIP: NEGATIVE
LYMPHOCYTES # BLD AUTO: 1.57 K/UL (ref 1–4.8)
LYMPHOCYTES NFR BLD AUTO: 33.1 % (ref 27–41)
MCH RBC QN AUTO: 32.5 PG (ref 27–31)
MCHC RBC AUTO-ENTMCNC: 32 G/DL (ref 32–36)
MCV RBC AUTO: 101.4 FL (ref 80–96)
MONOCYTES # BLD AUTO: 0.51 K/UL (ref 0–0.8)
MONOCYTES NFR BLD AUTO: 10.8 % (ref 2–6)
MPC BLD CALC-MCNC: 10 FL (ref 9.4–12.4)
NEUTROPHILS # BLD AUTO: 2.43 K/UL (ref 1.8–7.7)
NEUTROPHILS NFR BLD AUTO: 51.3 % (ref 53–65)
NITRITE UR QL STRIP: NEGATIVE
NRBC # BLD AUTO: 0 X10E3/UL
NRBC, AUTO (.00): 0 %
PH UR STRIP: 5 PH UNITS
PLATELET # BLD AUTO: 201 K/UL (ref 150–400)
POTASSIUM SERPL-SCNC: 3.7 MMOL/L (ref 3.5–5.1)
PROT SERPL-MCNC: 6.6 G/DL (ref 6.4–8.2)
PROT UR QL STRIP: NEGATIVE
RBC # BLD AUTO: 3.57 M/UL (ref 4.6–6.2)
RBC # UR STRIP: NEGATIVE /UL
SODIUM SERPL-SCNC: 141 MMOL/L (ref 136–145)
SP GR UR STRIP: 1.02
UROBILINOGEN UR STRIP-ACNC: NORMAL MG/DL
WBC # BLD AUTO: 4.74 K/UL (ref 4.5–11)

## 2022-11-01 PROCEDURE — 81003 URINALYSIS AUTO W/O SCOPE: CPT | Mod: QW,,, | Performed by: CLINICAL MEDICAL LABORATORY

## 2022-11-01 PROCEDURE — 99214 OFFICE O/P EST MOD 30 MIN: CPT | Mod: ,,, | Performed by: FAMILY MEDICINE

## 2022-11-01 PROCEDURE — 72110 X-RAY EXAM L-2 SPINE 4/>VWS: CPT | Mod: TC

## 2022-11-01 PROCEDURE — 85025 CBC WITH DIFFERENTIAL: ICD-10-PCS | Mod: ,,, | Performed by: CLINICAL MEDICAL LABORATORY

## 2022-11-01 PROCEDURE — 85025 COMPLETE CBC W/AUTO DIFF WBC: CPT | Mod: ,,, | Performed by: CLINICAL MEDICAL LABORATORY

## 2022-11-01 PROCEDURE — 20552 NJX 1/MLT TRIGGER POINT 1/2: CPT | Mod: ,,, | Performed by: FAMILY MEDICINE

## 2022-11-01 PROCEDURE — 20552 PR INJECT TRIGGER POINT, 1 OR 2: ICD-10-PCS | Mod: ,,, | Performed by: FAMILY MEDICINE

## 2022-11-01 PROCEDURE — 80053 COMPREHEN METABOLIC PANEL: CPT | Mod: ,,, | Performed by: CLINICAL MEDICAL LABORATORY

## 2022-11-01 PROCEDURE — 99214 PR OFFICE/OUTPT VISIT, EST, LEVL IV, 30-39 MIN: ICD-10-PCS | Mod: ,,, | Performed by: FAMILY MEDICINE

## 2022-11-01 PROCEDURE — 80053 COMPREHENSIVE METABOLIC PANEL: ICD-10-PCS | Mod: ,,, | Performed by: CLINICAL MEDICAL LABORATORY

## 2022-11-01 PROCEDURE — 81003 URINALYSIS, REFLEX TO URINE CULTURE: ICD-10-PCS | Mod: QW,,, | Performed by: CLINICAL MEDICAL LABORATORY

## 2022-11-01 RX ORDER — TRIAMCINOLONE ACETONIDE 40 MG/ML
80 INJECTION, SUSPENSION INTRA-ARTICULAR; INTRAMUSCULAR
Status: COMPLETED | OUTPATIENT
Start: 2022-11-01 | End: 2022-11-01

## 2022-11-01 RX ORDER — LIDOCAINE HYDROCHLORIDE 10 MG/ML
1 INJECTION INFILTRATION; PERINEURAL
Status: COMPLETED | OUTPATIENT
Start: 2022-11-01 | End: 2022-11-01

## 2022-11-01 RX ADMIN — LIDOCAINE HYDROCHLORIDE 1 ML: 10 INJECTION INFILTRATION; PERINEURAL at 03:11

## 2022-11-01 RX ADMIN — TRIAMCINOLONE ACETONIDE 80 MG: 40 INJECTION, SUSPENSION INTRA-ARTICULAR; INTRAMUSCULAR at 03:11

## 2022-11-01 NOTE — PROGRESS NOTES
Raymon Tlyer MD    [address] [place] [address]     PATIENT NAME: Tiny Gutierrez  : 1945  DATE: 22  MRN: [id number]      Billing Provider: Raymon Tyler MD  Level of Service: IA OFFICE/OUTPT VISIT, EST, LEVL IV, 30-39 MIN  Patient PCP Information       Provider PCP Type    Raymon Tyler MD General            Reason for Visit / Chief Complaint: Back Pain (Pt is having right lower back pain that has been present for the last few months. He stated that it got worse yesterday and started feeling bad. It is relieved when he lays flat.)       Update PCP  Update Chief Complaint         History of Present Illness / Problem Focused Workflow     Tiny Gutierrez presents to the clinic with Back Pain (Pt is having right lower back pain that has been present for the last few months. He stated that it got worse yesterday and started feeling bad. It is relieved when he lays flat.)    He feels weak, drowsy, sleepy, just does not feel good. This started several days ago and seems to be worsening     Also reports signficant right lower back pain, the pain is present in one location, he can touch the area, has a long history of low back problems          Back Pain  Pertinent negatives include no abdominal pain, chest pain, dysuria, fever, headaches or weakness.     Review of Systems     Review of Systems   Constitutional:  Negative for activity change, appetite change, chills, fatigue and fever.   HENT:  Negative for nasal congestion, ear pain, hearing loss, postnasal drip and sore throat.    Respiratory:  Negative for cough, chest tightness, shortness of breath and wheezing.    Cardiovascular:  Negative for chest pain, palpitations, leg swelling and claudication.   Gastrointestinal:  Negative for abdominal pain, change in bowel habit, constipation, diarrhea, nausea, vomiting and change in bowel habit.   Genitourinary:  Negative for dysuria.    Musculoskeletal:  Positive for back pain. Negative for arthralgias, gait problem and myalgias.   Integumentary:  Negative for rash.   Neurological:  Negative for weakness and headaches.   Psychiatric/Behavioral:  Negative for suicidal ideas. The patient is not nervous/anxious.       Medical / Social / Family History     Past Medical History:   Diagnosis Date    Aortic heart murmur     Asthma     Cancer     Emphysema/COPD     Hyperlipidemia     Ischemic cardiomyopathy     Mitral regurgitation     Old myocardial infarction 03/2001    Tricuspid regurgitation     Vitamin B 12 deficiency     Vitamin D deficiency        Past Surgical History:   Procedure Laterality Date    CARDIAC CATHETERIZATION      CARDIAC DEFIBRILLATOR PLACEMENT      CHOLECYSTECTOMY      CORONARY ARTERY BYPASS GRAFT      TOTAL KNEE ARTHROPLASTY Bilateral        Social History    reports that he has never smoked. He has never used smokeless tobacco. He reports that he does not drink alcohol and does not use drugs.    Family History  's family history includes Emphysema in his father.    Medications and Allergies     Medications  Outpatient Medications Marked as Taking for the 11/1/22 encounter (Office Visit) with Raymon Tyler MD   Medication Sig Dispense Refill    albuterol (PROVENTIL) 2.5 mg /3 mL (0.083 %) nebulizer solution 3 ml      albuterol (PROVENTIL/VENTOLIN HFA) 90 mcg/actuation inhaler 2 puffs as needed      aspirin (ECOTRIN) 81 MG EC tablet Take 81 mg by mouth once daily.      atorvastatin (LIPITOR) 40 MG tablet Take 1 tablet by mouth once daily 90 tablet 0    BREO ELLIPTA 200-25 mcg/dose DsDv diskus inhaler 1 puff once daily.      calcium carbonate/vitamin D3 (CALTRATE 600 + D ORAL) Take 1 capsule by mouth Daily.      clopidogreL (PLAVIX) 75 mg tablet Take 1 tablet (75 mg total) by mouth once daily. 90 tablet 3    furosemide (LASIX) 20 MG tablet Take 1 tablet by mouth once daily 90 tablet 0    leuprolide (ELIGARD) 7.5 mg (1  month) Syrg as directed      metoprolol succinate (TOPROL-XL) 25 MG 24 hr tablet 1/2 tablet      naproxen-diphenhydramine (ALEVE PM) 220-25 mg Tab       omega 3-dha-epa-fish oil 1,000 mg (120 mg-180 mg) Cap 1 capsule      omeprazole (PRILOSEC) 20 MG capsule TAKE 1 CAPSULE BY MOUTH ONCE DAILY FOR STOMACH 90 capsule 1    triamcinolone acetonide (NASACORT AQ NASL)          Allergies  Review of patient's allergies indicates:   Allergen Reactions    Pollen extracts        Physical Examination     Vitals:    11/01/22 1408   BP: 111/61   Pulse: (!) 58   Resp: 16     Physical Exam  Vitals and nursing note reviewed.   Constitutional:       General: He is not in acute distress.     Appearance: Normal appearance. He is not ill-appearing.   Eyes:      Extraocular Movements: Extraocular movements intact.      Pupils: Pupils are equal, round, and reactive to light.   Cardiovascular:      Rate and Rhythm: Normal rate and regular rhythm.      Heart sounds: Normal heart sounds.   Pulmonary:      Effort: Pulmonary effort is normal.      Breath sounds: Normal breath sounds.   Abdominal:      General: Bowel sounds are normal.      Palpations: Abdomen is soft.   Musculoskeletal:         General: Tenderness present. Normal range of motion.      Lumbar back: Spasms and tenderness present.        Back:    Skin:     Findings: No rash.   Neurological:      General: No focal deficit present.      Mental Status: He is alert and oriented to person, place, and time. Mental status is at baseline.   Psychiatric:         Mood and Affect: Mood normal.         Behavior: Behavior normal.      Procedure Note: Trigger point, right paraspinal muscle spasm. Patient consented to the procedure and verified the location. I cleaned the area with alcohol in the usual way. Using a 25guage, 1.5in needle, I injected 2ml of steroid and 1ml of lidocaine. Patient had immediate relief of symptoms. Blood loss less than 0.5ml.     Assessment and Plan (including Health  Maintenance)      Problem List  Smart Sets  Document Outside HM   :    Plan:         Health Maintenance Due   Topic Date Due    Hepatitis C Screening  Never done    Lipid Panel  10/27/2022       Problem List Items Addressed This Visit          Cardiac/Vascular    Hypertension, essential (Chronic)    Coronary artery disease involving native heart without angina pectoris (Chronic)    Aortic heart murmur (Chronic)    Status post coronary artery bypass graft (Chronic)    Overview     3 vessel             Orthopedic    Chronic right-sided low back pain without sciatica (Chronic)    Muscle spasm of back       Other    Fatigue (Chronic)     Other Visit Diagnoses       Myositis of other site, unspecified myositis type    -  Primary          Myositis of other site, unspecified myositis type    Hypertension, essential  -     CBC Auto Differential; Future; Expected date: 11/01/2022  -     Comprehensive Metabolic Panel; Future; Expected date: 11/01/2022  -     Urinalysis, Reflex to Urine Culture; Future; Expected date: 11/01/2022    Coronary artery disease involving native heart without angina pectoris, unspecified vessel or lesion type    Aortic heart murmur    Status post coronary artery bypass graft    Chronic right-sided low back pain without sciatica  -     X-Ray Lumbar Spine 5 View; Future; Expected date: 11/01/2022  -     triamcinolone acetonide injection 80 mg  -     LIDOcaine HCL 10 mg/ml (1%) injection 1 mL    Chronic fatigue  -     CBC Auto Differential; Future; Expected date: 11/01/2022  -     Comprehensive Metabolic Panel; Future; Expected date: 11/01/2022  -     Urinalysis, Reflex to Urine Culture; Future; Expected date: 11/01/2022    Muscle spasm of back     Health Maintenance Topics with due status: Not Due       Topic Last Completion Date    Colonoscopy 06/30/2014    Aspirin/Antiplatelet Therapy 11/01/2022       Procedures     Future Appointments   Date Time Provider Department Center   1/11/2023 10:15 AM  James Hughes MD [facility]   4/10/2023  1:30 PM Raymon Tyler MD [facility]   10/27/2023  2:00 PM AWV NURSE, [facility]        No follow-ups on file.     Signature:  Raymon Tyler MD  [address] [place] [address]  Phone #: [phone]  Fax #: [phone]    Date of encounter: 11/1/22    There are no Patient Instructions on file for this visit.

## 2022-11-03 ENCOUNTER — TELEPHONE (OUTPATIENT)
Dept: FAMILY MEDICINE | Facility: CLINIC | Age: 77
End: 2022-11-03
Payer: MEDICARE

## 2022-11-03 ENCOUNTER — HOSPITAL ENCOUNTER (OUTPATIENT)
Dept: RADIOLOGY | Facility: HOSPITAL | Age: 77
Discharge: HOME | End: 2022-11-03
Attending: FAMILY MEDICINE
Payer: MEDICARE

## 2022-11-03 DIAGNOSIS — R10.9 ACUTE RIGHT FLANK PAIN: ICD-10-CM

## 2022-11-03 DIAGNOSIS — R74.8 ELEVATED LIVER ENZYMES: Primary | ICD-10-CM

## 2022-11-03 DIAGNOSIS — R74.8 ELEVATED LIVER ENZYMES: ICD-10-CM

## 2022-11-03 PROCEDURE — 76705 ECHO EXAM OF ABDOMEN: CPT | Mod: TC

## 2022-11-03 NOTE — TELEPHONE ENCOUNTER
Spoke with pt wife she verbalized understanding of results.----- Message from Raymon Tyler MD sent at 11/3/2022  8:43 AM CDT -----  Liver enzymes are higher today than previous, with his lethargy, and Ride sided flank pain. He likely needs a Liver US     Mild anemia

## 2022-11-03 NOTE — TELEPHONE ENCOUNTER
----- Message from Raymon Tyler MD sent at 11/3/2022 12:19 PM CDT -----  Nothing appreciated on liver US

## 2022-11-05 PROBLEM — R50.9 FEVER: Status: RESOLVED | Noted: 2021-05-27 | Resolved: 2022-11-05

## 2022-11-05 PROBLEM — M62.830 MUSCLE SPASM OF BACK: Status: ACTIVE | Noted: 2022-11-05

## 2022-12-13 ENCOUNTER — OFFICE VISIT (OUTPATIENT)
Dept: FAMILY MEDICINE | Facility: CLINIC | Age: 77
End: 2022-12-13
Payer: MEDICARE

## 2022-12-13 ENCOUNTER — HOSPITAL ENCOUNTER (EMERGENCY)
Facility: HOSPITAL | Age: 77
Discharge: HOME | End: 2022-12-13
Attending: EMERGENCY MEDICINE
Payer: MEDICARE

## 2022-12-13 VITALS
HEIGHT: 73 IN | DIASTOLIC BLOOD PRESSURE: 84 MMHG | HEART RATE: 94 BPM | OXYGEN SATURATION: 96 % | BODY MASS INDEX: 32.67 KG/M2 | SYSTOLIC BLOOD PRESSURE: 121 MMHG

## 2022-12-13 VITALS
HEART RATE: 71 BPM | DIASTOLIC BLOOD PRESSURE: 66 MMHG | TEMPERATURE: 98 F | SYSTOLIC BLOOD PRESSURE: 124 MMHG | RESPIRATION RATE: 13 BRPM | BODY MASS INDEX: 31.81 KG/M2 | OXYGEN SATURATION: 100 % | WEIGHT: 240 LBS | HEIGHT: 73 IN

## 2022-12-13 DIAGNOSIS — R42 POSTURAL DIZZINESS WITH PRESYNCOPE: ICD-10-CM

## 2022-12-13 DIAGNOSIS — R00.2 PALPITATION: ICD-10-CM

## 2022-12-13 DIAGNOSIS — I48.3 TYPICAL ATRIAL FLUTTER: Primary | ICD-10-CM

## 2022-12-13 DIAGNOSIS — R07.9 CHEST PAIN, UNSPECIFIED TYPE: ICD-10-CM

## 2022-12-13 DIAGNOSIS — R06.02 SHORTNESS OF BREATH: ICD-10-CM

## 2022-12-13 DIAGNOSIS — R55 POSTURAL DIZZINESS WITH PRESYNCOPE: ICD-10-CM

## 2022-12-13 DIAGNOSIS — I48.92 NEW ONSET ATRIAL FLUTTER: Primary | ICD-10-CM

## 2022-12-13 LAB
ALBUMIN SERPL BCP-MCNC: 3.3 G/DL (ref 3.5–5)
ALBUMIN/GLOB SERPL: 0.9 {RATIO}
ALP SERPL-CCNC: 810 U/L (ref 45–115)
ALT SERPL W P-5'-P-CCNC: 145 U/L (ref 16–61)
ANION GAP SERPL CALCULATED.3IONS-SCNC: 12 MMOL/L (ref 7–16)
AST SERPL W P-5'-P-CCNC: 110 U/L (ref 15–37)
BASOPHILS # BLD AUTO: 0.02 K/UL (ref 0–0.2)
BASOPHILS # BLD AUTO: 0.03 K/UL (ref 0–0.2)
BASOPHILS NFR BLD AUTO: 0.4 % (ref 0–1)
BASOPHILS NFR BLD AUTO: 0.7 % (ref 0–1)
BILIRUB SERPL-MCNC: 0.8 MG/DL (ref ?–1.2)
BUN SERPL-MCNC: 18 MG/DL (ref 7–18)
BUN/CREAT SERPL: 15 (ref 6–20)
CALCIUM SERPL-MCNC: 9.3 MG/DL (ref 8.5–10.1)
CHLORIDE SERPL-SCNC: 103 MMOL/L (ref 98–107)
CO2 SERPL-SCNC: 29 MMOL/L (ref 21–32)
CREAT SERPL-MCNC: 1.19 MG/DL (ref 0.7–1.3)
DIFFERENTIAL METHOD BLD: ABNORMAL
DIFFERENTIAL METHOD BLD: ABNORMAL
EGFR (NO RACE VARIABLE) (RUSH/TITUS): 62 ML/MIN/1.73M²
EKG 12-LEAD: NORMAL
EOSINOPHIL # BLD AUTO: 0.04 K/UL (ref 0–0.5)
EOSINOPHIL # BLD AUTO: 0.11 K/UL (ref 0–0.5)
EOSINOPHIL NFR BLD AUTO: 0.9 % (ref 1–4)
EOSINOPHIL NFR BLD AUTO: 2.4 % (ref 1–4)
ERYTHROCYTE [DISTWIDTH] IN BLOOD BY AUTOMATED COUNT: 14.6 % (ref 11.5–14.5)
ERYTHROCYTE [DISTWIDTH] IN BLOOD BY AUTOMATED COUNT: 15.6 % (ref 11.5–14.5)
GLOBULIN SER-MCNC: 3.5 G/DL (ref 2–4)
GLUCOSE SERPL-MCNC: 92 MG/DL (ref 74–106)
HCT VFR BLD AUTO: 40.6 % (ref 40–54)
HCT VFR BLD AUTO: 49.6 % (ref 40–54)
HGB BLD-MCNC: 13.4 G/DL (ref 13.5–18)
HGB BLD-MCNC: 16.7 G/DL (ref 13.5–18)
IMM GRANULOCYTES # BLD AUTO: 0.01 K/UL (ref 0–0.04)
IMM GRANULOCYTES # BLD AUTO: 0.02 K/UL (ref 0–0.04)
IMM GRANULOCYTES NFR BLD: 0.2 % (ref 0–0.4)
IMM GRANULOCYTES NFR BLD: 0.4 % (ref 0–0.4)
LYMPHOCYTES # BLD AUTO: 1.32 K/UL (ref 1–4.8)
LYMPHOCYTES # BLD AUTO: 1.4 K/UL (ref 1–4.8)
LYMPHOCYTES NFR BLD AUTO: 28.8 % (ref 27–41)
LYMPHOCYTES NFR BLD AUTO: 30.8 % (ref 27–41)
MAGNESIUM SERPL-MCNC: 2 MG/DL (ref 1.7–2.3)
MCH RBC QN AUTO: 29 PG (ref 27–31)
MCH RBC QN AUTO: 31.6 PG (ref 27–31)
MCHC RBC AUTO-ENTMCNC: 33 G/DL (ref 32–36)
MCHC RBC AUTO-ENTMCNC: 33.7 G/DL (ref 32–36)
MCV RBC AUTO: 86.1 FL (ref 80–96)
MCV RBC AUTO: 95.8 FL (ref 80–96)
MONOCYTES # BLD AUTO: 0.35 K/UL (ref 0–0.8)
MONOCYTES # BLD AUTO: 0.56 K/UL (ref 0–0.8)
MONOCYTES NFR BLD AUTO: 12.3 % (ref 2–6)
MONOCYTES NFR BLD AUTO: 7.6 % (ref 2–6)
MPC BLD CALC-MCNC: 10.2 FL (ref 9.4–12.4)
MPC BLD CALC-MCNC: 9.2 FL (ref 9.4–12.4)
NEUTROPHILS # BLD AUTO: 2.44 K/UL (ref 1.8–7.7)
NEUTROPHILS # BLD AUTO: 2.83 K/UL (ref 1.8–7.7)
NEUTROPHILS NFR BLD AUTO: 53.9 % (ref 53–65)
NEUTROPHILS NFR BLD AUTO: 61.6 % (ref 53–65)
NRBC # BLD AUTO: 0 X10E3/UL
NRBC # BLD AUTO: 0 X10E3/UL
NRBC, AUTO (.00): 0 %
NRBC, AUTO (.00): 0 %
NT-PROBNP SERPL-MCNC: 3113 PG/ML (ref 1–450)
PLATELET # BLD AUTO: 175 K/UL (ref 150–400)
PLATELET # BLD AUTO: 212 K/UL (ref 150–400)
POTASSIUM SERPL-SCNC: 4.2 MMOL/L (ref 3.5–5.1)
PR INTERVAL: NORMAL
PROT SERPL-MCNC: 6.8 G/DL (ref 6.4–8.2)
PRT AXES: NORMAL
QRS DURATION: NORMAL
QT/QTC: NORMAL
RBC # BLD AUTO: 4.24 M/UL (ref 4.6–6.2)
RBC # BLD AUTO: 5.76 M/UL (ref 4.6–6.2)
SODIUM SERPL-SCNC: 140 MMOL/L (ref 136–145)
TROPONIN I SERPL HS-MCNC: 51.3 PG/ML
VENTRICULAR RATE: NORMAL
WBC # BLD AUTO: 4.54 K/UL (ref 4.5–11)
WBC # BLD AUTO: 4.59 K/UL (ref 4.5–11)

## 2022-12-13 PROCEDURE — 99214 PR OFFICE/OUTPT VISIT, EST, LEVL IV, 30-39 MIN: ICD-10-PCS | Mod: ,,, | Performed by: FAMILY MEDICINE

## 2022-12-13 PROCEDURE — 93010 EKG 12-LEAD: ICD-10-PCS | Mod: 77,,, | Performed by: STUDENT IN AN ORGANIZED HEALTH CARE EDUCATION/TRAINING PROGRAM

## 2022-12-13 PROCEDURE — 80053 COMPREHEN METABOLIC PANEL: CPT | Performed by: EMERGENCY MEDICINE

## 2022-12-13 PROCEDURE — 84484 ASSAY OF TROPONIN QUANT: CPT | Performed by: EMERGENCY MEDICINE

## 2022-12-13 PROCEDURE — 36415 COLL VENOUS BLD VENIPUNCTURE: CPT | Performed by: EMERGENCY MEDICINE

## 2022-12-13 PROCEDURE — 99214 OFFICE O/P EST MOD 30 MIN: CPT | Mod: ,,, | Performed by: FAMILY MEDICINE

## 2022-12-13 PROCEDURE — 99283 EMERGENCY DEPT VISIT LOW MDM: CPT | Mod: ,,, | Performed by: EMERGENCY MEDICINE

## 2022-12-13 PROCEDURE — 83735 ASSAY OF MAGNESIUM: CPT | Performed by: EMERGENCY MEDICINE

## 2022-12-13 PROCEDURE — 85025 COMPLETE CBC W/AUTO DIFF WBC: CPT | Performed by: EMERGENCY MEDICINE

## 2022-12-13 PROCEDURE — 93005 ELECTROCARDIOGRAM TRACING: CPT

## 2022-12-13 PROCEDURE — 83880 ASSAY OF NATRIURETIC PEPTIDE: CPT | Performed by: EMERGENCY MEDICINE

## 2022-12-13 PROCEDURE — 93010 PR ELECTROCARDIOGRAM REPORT: ICD-10-PCS | Mod: ,,, | Performed by: FAMILY MEDICINE

## 2022-12-13 PROCEDURE — 99285 EMERGENCY DEPT VISIT HI MDM: CPT | Mod: 25

## 2022-12-13 PROCEDURE — 93010 ELECTROCARDIOGRAM REPORT: CPT | Mod: 77,,, | Performed by: STUDENT IN AN ORGANIZED HEALTH CARE EDUCATION/TRAINING PROGRAM

## 2022-12-13 PROCEDURE — 93010 ELECTROCARDIOGRAM REPORT: CPT | Mod: ,,, | Performed by: FAMILY MEDICINE

## 2022-12-13 PROCEDURE — 99283 PR EMERGENCY DEPT VISIT,LEVEL III: ICD-10-PCS | Mod: ,,, | Performed by: EMERGENCY MEDICINE

## 2022-12-13 PROCEDURE — 93005 ELECTROCARDIOGRAM TRACING: CPT | Mod: RHCUB | Performed by: FAMILY MEDICINE

## 2022-12-13 NOTE — ASSESSMENT & PLAN NOTE
He has a complex heart history, he will go to the ER at [facility] in [place] by private vehicle. His wife is going to take him.

## 2022-12-13 NOTE — ED PROVIDER NOTES
Encounter Date: 12/13/2022    SCRIBE #1 NOTE: I, Sandra Chapman, am scribing for, and in the presence of,  Mark Childs MD. I have scribed the entire note.     History     Chief Complaint   Patient presents with    Abnormal ECG     The patient is a 77 y.o. male that presents to the emergency department due to nausea. The patient explains that he was seen today at a [facility] and was told to come here due to his ECG. The patient explains that he has been feeling ill since yesterday Dec 12. The patient is experiencing shortness of breath and nausea. He denies any chest pain, vomiting, fever, and no diarrhea. The patient has a medical hx of triple bypass surgery and he states he has had 2 heart attacks in the past and he takes Plavix for that. He also has a pacemaker. The patient denies any previous hx of irregular heartbeats and states he has not had any stents. He is not a drinker or smoker. No other medical hx or symptoms were reported.     The history is provided by the patient. No  was used.   Review of patient's allergies indicates:   Allergen Reactions    Pollen extracts      Past Medical History:   Diagnosis Date    Aortic heart murmur     Asthma     Cancer     Emphysema/COPD     Hyperlipidemia     Ischemic cardiomyopathy     Mitral regurgitation     Old myocardial infarction 03/2001    Tricuspid regurgitation     Vitamin B 12 deficiency     Vitamin D deficiency      Past Surgical History:   Procedure Laterality Date    CARDIAC CATHETERIZATION      CARDIAC DEFIBRILLATOR PLACEMENT      CHOLECYSTECTOMY      CORONARY ARTERY BYPASS GRAFT      TOTAL KNEE ARTHROPLASTY Bilateral      Family History   Problem Relation Age of Onset    Emphysema Father      Social History     Tobacco Use    Smoking status: Never    Smokeless tobacco: Never   Substance Use Topics    Alcohol use: Never    Drug use: Never     Review of Systems   Constitutional:  Negative for fever.   Eyes: Negative.     Respiratory:  Positive for shortness of breath.    Cardiovascular:  Negative for chest pain.   Gastrointestinal:  Positive for nausea. Negative for diarrhea and vomiting.   Endocrine: Negative.    Musculoskeletal: Negative.    Skin: Negative.    Allergic/Immunologic: Negative.    Neurological: Negative.    Hematological: Negative.    Psychiatric/Behavioral: Negative.     All other systems reviewed and are negative.    Physical Exam     Initial Vitals [12/13/22 1242]   BP Pulse Resp Temp SpO2   114/68 71 17 98 °F (36.7 °C) 95 %      MAP       --         Physical Exam    Constitutional: He appears well-developed and well-nourished.   Eyes: Conjunctivae and EOM are normal. Pupils are equal, round, and reactive to light.   Cardiovascular:  Normal rate, regular rhythm and normal heart sounds.           Pacemaker    Pulmonary/Chest: Breath sounds normal.   Abdominal: Abdomen is soft. Bowel sounds are normal.     Neurological: He is alert and oriented to person, place, and time. He has normal strength and normal reflexes.   Skin: Skin is warm and dry.   Psychiatric: He has a normal mood and affect. His behavior is normal. Judgment and thought content normal.       ED Course   Procedures  Labs Reviewed   CBC WITH DIFFERENTIAL - Abnormal; Notable for the following components:       Result Value    RDW 15.6 (*)     Monocytes % 7.6 (*)     Eosinophils % 0.9 (*)     All other components within normal limits    Narrative:     DISREGARD THESE RESULTS. WRONG PATIENT STUCK IN ER. NEW SAMPLE COLLECTED. SEE RECOLLECTED CBC #[id number].   COMPREHENSIVE METABOLIC PANEL - Abnormal; Notable for the following components:    Albumin 3.3 (*)     Alk Phos 810 (*)      (*)      (*)     All other components within normal limits   NT-PRO NATRIURETIC PEPTIDE - Abnormal; Notable for the following components:    ProBNP 3,113 (*)     All other components within normal limits   CBC WITH DIFFERENTIAL - Abnormal; Notable for the  following components:    RBC 4.24 (*)     Hemoglobin 13.4 (*)     MCH 31.6 (*)     RDW 14.6 (*)     MPV 9.2 (*)     Monocytes % 12.3 (*)     All other components within normal limits   TROPONIN I - Normal   MAGNESIUM - Normal   CBC W/ AUTO DIFFERENTIAL    Narrative:     The following orders were created for panel order CBC auto differential.  Procedure                               Abnormality         Status                     ---------                               -----------         ------                     CBC with Differential[[id number]]        Abnormal            Edited Result - FINAL        Please view results for these tests on the individual orders.   CBC W/ AUTO DIFFERENTIAL    Narrative:     The following orders were created for panel order CBC auto differential.  Procedure                               Abnormality         Status                     ---------                               -----------         ------                     CBC with Differential[[id number]]        Abnormal            Final result                 Please view results for these tests on the individual orders.   EXTRA TUBES    Narrative:     The following orders were created for panel order EXTRA TUBES.  Procedure                               Abnormality         Status                     ---------                               -----------         ------                     Light Blue Top Hold[[id number]]                              In process                   Please view results for these tests on the individual orders.   LIGHT BLUE TOP HOLD     EKG Readings: (Independently Interpreted)   Rhythm: Atrial Flutter (with PVCs or aberrant ventricular conduction).   ECG Results              EKG 12-lead (In process)  Result time 12/13/22 13:49:47      In process by Interface, Lab In [facility] (12/13/22 13:49:47)                   Narrative:    Test Reason : R06.02,    Vent. Rate : 061 BPM     Atrial Rate : 000 BPM     P-R Int : 000 ms           QRS Dur : 106 ms      QT Int : 404 ms       P-R-T Axes : 000 062 -23 degrees     QTc Int : 406 ms    Atrial flutter  with PVC(s)  or aberrant ventricular conduction  Inferior infarct - age undetermined  Anterior infarct - age undetermined  Lateral T wave abnormality  may be due to myocardial ischemia  Low QRS voltages in precordial leads  Abnormal ECG      Referred By: [email or web address]   SELF           Confirmed By:                                   Imaging Results              X-Ray Chest AP Portable (Final result)  Result time 12/13/22 14:01:16      Final result by Jf Doty II, MD (12/13/22 14:01:16)                   Impression:      Chronic lung and cardiac surgery changes.  No acute process or significant change.      Electronically signed by: Jf Doty  Date:    12/13/2022  Time:    14:01               Narrative:    EXAMINATION:  XR CHEST AP PORTABLE    CLINICAL HISTORY:  CHF;    COMPARISON:  23 October 2020    FINDINGS:  The heart and mediastinum are stable in size and configuration with cardiac surgery changes.    Pacemaker device is unchanged in position.    The pulmonary vascularity is normal in caliber. Lung volumes are increased with prominent bronchial markings.  Multiple granulomas present stable in appearance.  No lung infiltrates, effusions, pneumothorax or other abnormality is demonstrated.                                       Medications - No data to display  Medical Decision Making:   Initial Assessment:   13:25 Dr. Childs spoke with Dr. Hughse about the patient  - Dr. Hughes recommended either the patient be admitted or outpatient treatment on [facility]          Attending Attestation:           Physician Attestation for Scribe:  Physician Attestation Statement for Scribe #1: I, Mark Childs MD, reviewed documentation, as scribed by Sandra Chapman in my presence, and it is both accurate and complete.           ED Course as of 12/13/22 1508   Tue Dec 13, 2022   1323 Patient given  option for admission for cardioversion versus outpatient treatment with Eliquis and later cardioversion after a month.  Patient decided he would prefer outpatient treatment.  We will prescribe Eliquis.  Dr. Olvera recommended patient also continue low-dose aspirin and Plavix. [BB]   1501 Magnesium: 2.0 [BB]   1501 Troponin I High Sensitivity: 51.3 [BB]   1501 NT-proBNP(!): 3,113 [BB]   1502 WBC: 4.54 [BB]   1502 HEMOGLOBIN(!): 13.4 [BB]   1502 HEMATOCRIT: 40.6 [BB]   1502 Platelets: 212 [BB]      ED Course User Index  [BB] Mark Childs MD                 Clinical Impression:   Final diagnoses:  [R06.02] Shortness of breath  [I48.92] New onset atrial flutter (Primary)        ED Disposition Condition    Discharge Stable          ED Prescriptions       Medication Sig Dispense Start Date End Date Auth. Provider    apixaban (ELIQUIS) 5 mg Tab Take 1 tablet (5 mg total) by mouth 2 (two) times daily. 60 tablet 12/13/2022 1/12/2023 Mark Childs MD          Follow-up Information    None          Mark Childs MD  12/13/22 0560

## 2022-12-13 NOTE — PROGRESS NOTES
Raymon Tyler MD    [address] [place] [address]     PATIENT NAME: Tiny Gutierrez  : 1945  DATE: 22  MRN: [id number]      Billing Provider: Raymon Tyler MD  Level of Service: [place] OFFICE/OUTPT VISIT, EST, LEVL IV, 30-39 MIN  Patient PCP Information       Provider PCP Type    Raymon Tyler MD General            Reason for Visit / Chief Complaint: Chest Pain (Mr. Gutierrez presents to clinic with presyncope, weakness, light headed, and his heart feels like its beating incorrectly. )       Update PCP  Update Chief Complaint         History of Present Illness / Problem Focused Workflow     Tiny Gutierrez presents to the clinic with Chest Pain (Mr. Gutierrez presents to clinic with presyncope, weakness, light headed, and his heart feels like its beating incorrectly. )     When he arrived in clinic he had an episode of presyncope, felt weak like he was going to pass out. Once he sat down he felt a little better. He states that his heart has been feeling strange recently.     EKG in clinic shows new onset Atrial Flutter    HPI    Review of Systems     Review of Systems   Constitutional:  Negative for activity change, appetite change, chills, fatigue and fever.   HENT:  Negative for nasal congestion, ear pain, hearing loss, postnasal drip and sore throat.    Respiratory:  Positive for chest tightness and shortness of breath. Negative for cough and wheezing.    Cardiovascular:  Positive for chest pain. Negative for palpitations, leg swelling and claudication.   Gastrointestinal:  Negative for abdominal pain, change in bowel habit, constipation, diarrhea, nausea, vomiting and change in bowel habit.   Genitourinary:  Negative for dysuria.   Musculoskeletal:  Negative for arthralgias, back pain, gait problem and myalgias.   Integumentary:  Negative for rash.   Neurological:  Positive for weakness. Negative for headaches.   Psychiatric/Behavioral:   Negative for suicidal ideas. The patient is not nervous/anxious.       Medical / Social / Family History     Past Medical History:   Diagnosis Date    Aortic heart murmur     Asthma     Cancer     Emphysema/COPD     Hyperlipidemia     Ischemic cardiomyopathy     Mitral regurgitation     Old myocardial infarction 03/2001    Tricuspid regurgitation     Vitamin B 12 deficiency     Vitamin D deficiency        Past Surgical History:   Procedure Laterality Date    CARDIAC CATHETERIZATION      CARDIAC DEFIBRILLATOR PLACEMENT      CHOLECYSTECTOMY      CORONARY ARTERY BYPASS GRAFT      TOTAL KNEE ARTHROPLASTY Bilateral        Social History    reports that he has never smoked. He has never used smokeless tobacco. He reports that he does not drink alcohol and does not use drugs.    Family History  's family history includes Emphysema in his father.    Medications and Allergies     Medications  No outpatient medications have been marked as taking for the 12/13/22 encounter (Office Visit) with Raymon Tyler MD.       Allergies  Review of patient's allergies indicates:   Allergen Reactions    Pollen extracts        Physical Examination     Vitals:    12/13/22 1120   BP: 121/84   Pulse: 94     Physical Exam  Vitals and nursing note reviewed.   Constitutional:       General: He is not in acute distress.     Appearance: Normal appearance. He is not ill-appearing.   Eyes:      Extraocular Movements: Extraocular movements intact.      Pupils: Pupils are equal, round, and reactive to light.   Cardiovascular:      Rate and Rhythm: Normal rate. Rhythm irregular.      Heart sounds: Normal heart sounds.   Pulmonary:      Effort: Pulmonary effort is normal.      Breath sounds: Normal breath sounds.   Abdominal:      General: Bowel sounds are normal.      Palpations: Abdomen is soft.   Musculoskeletal:         General: Normal range of motion.   Skin:     Findings: No rash.   Neurological:      General: No focal deficit present.       Mental Status: He is alert and oriented to person, place, and time. Mental status is at baseline.   Psychiatric:         Mood and Affect: Mood normal.         Behavior: Behavior normal.          Assessment and Plan (including Health Maintenance)      Problem List  Smart Sets  Document Outside HM   :    Plan:         Health Maintenance Due   Topic Date Due    Hepatitis C Screening  Never done    Lipid Panel  10/27/2022       Problem List Items Addressed This Visit          Cardiac/Vascular    Typical atrial flutter - Primary    Current Assessment & Plan     He has a complex heart history, he will go to the ER at [facility] in [place] by private vehicle. His wife is going to take him.               Other Visit Diagnoses       Chest pain, unspecified type        Relevant Orders    POCT EKG 12-LEAD (Manually Resulted by Ordering Provider) (Completed)    Postural dizziness with presyncope        Relevant Orders    POCT EKG 12-LEAD (Manually Resulted by Ordering Provider) (Completed)    Palpitation        Relevant Orders    POCT EKG 12-LEAD (Manually Resulted by Ordering Provider) (Completed)          Typical atrial flutter    Chest pain, unspecified type  -     POCT EKG 12-LEAD (Manually Resulted by Ordering Provider)    Postural dizziness with presyncope  -     POCT EKG 12-LEAD (Manually Resulted by Ordering Provider)    Palpitation  -     POCT EKG 12-LEAD (Manually Resulted by Ordering Provider)       Health Maintenance Topics with due status: Not Due       Topic Last Completion Date    Colonoscopy 06/30/2014    Aspirin/Antiplatelet Therapy 11/05/2022       Procedures     Future Appointments   Date Time Provider Department Center   12/13/2022  2:30 PM Raymon Tyler MD [facility] [place]   1/11/2023 10:15 AM Jmaes Hughes MD [facility]   4/10/2023  1:30 PM Raymon Tyler MD [facility] [place]   10/27/2023  2:00 PM AWV NURSE, [facility] [place]  Emily        Follow up if symptoms worsen or fail to improve.     Signature:  Raymon Tyler MD  [address] [place] [address]  Phone #: [phone]  Fax #: [phone]    Date of encounter: 12/13/22    There are no Patient Instructions on file for this visit.

## 2022-12-13 NOTE — DISCHARGE INSTRUCTIONS
Follow-up with cardiology in 1 month.  Take Eliquis as prescribed.  Return to emergency department for elevated heart rate, chest pain, shortness of breath, or other worsening or further problems.

## 2022-12-14 ENCOUNTER — OFFICE VISIT (OUTPATIENT)
Dept: FAMILY MEDICINE | Facility: CLINIC | Age: 77
End: 2022-12-14
Payer: MEDICARE

## 2022-12-14 VITALS
HEIGHT: 73 IN | OXYGEN SATURATION: 99 % | SYSTOLIC BLOOD PRESSURE: 110 MMHG | BODY MASS INDEX: 31.14 KG/M2 | DIASTOLIC BLOOD PRESSURE: 72 MMHG | TEMPERATURE: 97 F | HEART RATE: 68 BPM | WEIGHT: 235 LBS | RESPIRATION RATE: 18 BRPM

## 2022-12-14 DIAGNOSIS — I48.3 TYPICAL ATRIAL FLUTTER: ICD-10-CM

## 2022-12-14 DIAGNOSIS — Z86.19 H/O ROCKY MOUNTAIN SPOTTED FEVER: ICD-10-CM

## 2022-12-14 DIAGNOSIS — R11.0 NAUSEA: Primary | ICD-10-CM

## 2022-12-14 PROCEDURE — 99214 PR OFFICE/OUTPT VISIT, EST, LEVL IV, 30-39 MIN: ICD-10-PCS | Mod: ,,, | Performed by: NURSE PRACTITIONER

## 2022-12-14 PROCEDURE — 96372 PR INJECTION,THERAP/PROPH/DIAG2ST, IM OR SUBCUT: ICD-10-PCS | Mod: ,,, | Performed by: NURSE PRACTITIONER

## 2022-12-14 PROCEDURE — 96372 THER/PROPH/DIAG INJ SC/IM: CPT | Mod: ,,, | Performed by: NURSE PRACTITIONER

## 2022-12-14 PROCEDURE — 99214 OFFICE O/P EST MOD 30 MIN: CPT | Mod: ,,, | Performed by: NURSE PRACTITIONER

## 2022-12-14 RX ORDER — ONDANSETRON 4 MG/1
4 TABLET, FILM COATED ORAL EVERY 8 HOURS PRN
Qty: 30 TABLET | Refills: 1 | Status: SHIPPED | OUTPATIENT
Start: 2022-12-14 | End: 2023-05-25 | Stop reason: SDUPTHER

## 2022-12-14 RX ORDER — DOXYCYCLINE 100 MG/1
100 CAPSULE ORAL EVERY 12 HOURS
Qty: 20 CAPSULE | Refills: 0 | Status: SHIPPED | OUTPATIENT
Start: 2022-12-14 | End: 2023-03-06

## 2022-12-14 RX ORDER — ONDANSETRON 2 MG/ML
4 INJECTION INTRAMUSCULAR; INTRAVENOUS ONCE
Status: COMPLETED | OUTPATIENT
Start: 2022-12-14 | End: 2022-12-14

## 2022-12-14 RX ORDER — ONDANSETRON 2 MG/ML
8 INJECTION INTRAMUSCULAR; INTRAVENOUS
Status: DISCONTINUED | OUTPATIENT
Start: 2022-12-14 | End: 2022-12-14

## 2022-12-14 RX ADMIN — ONDANSETRON 4 MG: 2 INJECTION INTRAMUSCULAR; INTRAVENOUS at 12:12

## 2022-12-14 NOTE — PROGRESS NOTES
Jackie Rojas DNP, ROB    [address] [place] [address]     PATIENT NAME: Tiny Gutierrez  : 1945  DATE: 22  MRN: [id number]      Billing Provider: Jackie Rojas DNP, ROB  Level of Service:   Patient PCP Information       Provider PCP Type    Raymon Tyler MD General            Reason for Visit / Chief Complaint: Dizziness and Nausea (Patient is here today for nausea and dizziness. Patient stated that he was here yesterday for a the same problem but was sent to [place] for a atrial flutter. Patient stated that he has [place] fever a few years ago a had the same symptoms. Patient stated that he did pull a few ticks off him a while back also. )       Update PCP  Update Chief Complaint         History of Present Illness / Problem Focused Workflow     Tiny Gutierrez presents to the clinic with Dizziness and Nausea (Patient is here today for nausea and dizziness. Patient stated that he was here yesterday for a the same problem but was sent to [place] for a atrial flutter. Patient stated that he has [place] fever a few years ago a had the same symptoms. Patient stated that he did pull a few ticks off him a while back also. )     Pt denies any fever, abdominal pain or constipation.     Dizziness:    Associated symptoms: nausea.no ear pain, no weakness, no palpitations and no chest pain.  Nausea  Associated symptoms include nausea. Pertinent negatives include no abdominal pain, arthralgias, chest pain, coughing, numbness, rash, sore throat or weakness.     Review of Systems     Review of Systems   Constitutional:  Negative for activity change and appetite change.   HENT:  Negative for dental problem, ear pain, sinus pressure/congestion and sore throat.    Respiratory:  Negative for cough, chest tightness and shortness of breath.    Cardiovascular:  Negative for chest pain and palpitations.   Gastrointestinal:  Positive for nausea.  Negative for abdominal pain and constipation.   Genitourinary:  Negative for bladder incontinence and dysuria.   Musculoskeletal:  Negative for arthralgias.   Integumentary:  Negative for rash.   Neurological:  Positive for dizziness. Negative for speech difficulty, weakness and numbness.      Medical / Social / Family History     Past Medical History:   Diagnosis Date    Aortic heart murmur     Asthma     Cancer     Emphysema/COPD     Hyperlipidemia     Ischemic cardiomyopathy     Mitral regurgitation     Old myocardial infarction 03/2001    Tricuspid regurgitation     Vitamin B 12 deficiency     Vitamin D deficiency        Past Surgical History:   Procedure Laterality Date    CARDIAC CATHETERIZATION      CARDIAC DEFIBRILLATOR PLACEMENT      CHOLECYSTECTOMY      CORONARY ARTERY BYPASS GRAFT      TOTAL KNEE ARTHROPLASTY Bilateral        Social History  Mr. Tiny Gutierrez  reports that he has never smoked. He has never used smokeless tobacco. He reports that he does not drink alcohol and does not use drugs.    Family History  Mr. Tiny Gutierrez's family history includes Emphysema in his father.    Medications and Allergies     Medications  Outpatient Medications Marked as Taking for the 12/14/22 encounter (Office Visit) with Jackie Rojas DNP, FNP   Medication Sig Dispense Refill    apixaban (ELIQUIS) 5 mg Tab Take 1 tablet (5 mg total) by mouth 2 (two) times daily. 60 tablet 1     Current Facility-Administered Medications for the 12/14/22 encounter (Office Visit) with Jackie Rojas DNP, FNP   Medication Dose Route Frequency Provider Last Rate Last Admin    [COMPLETED] ondansetron injection 4 mg  4 mg Intramuscular Once Jackie Rojas DNP, FNP   4 mg at 12/14/22 1203    [DISCONTINUED] ondansetron injection 8 mg  8 mg Intramuscular 1 time in Clinic/HOD Jackie Rojas DNP, FNP           Allergies  Review of patient's allergies indicates:   Allergen Reactions    Pollen extracts        Physical Examination  "    Vitals:    12/14/22 1122   BP: 110/72   BP Location: Left arm   Patient Position: Sitting   Pulse: 68   Resp: 18   Temp: 97 °F (36.1 °C)   SpO2: 99%   Weight: 106.6 kg (235 lb)   Height: 6' 1" (1.854 m)     Physical Exam  Vitals and nursing note reviewed.   Constitutional:       General: He is not in acute distress.     Appearance: He is normal weight.   HENT:      Mouth/Throat:      Mouth: Mucous membranes are moist.   Eyes:      Pupils: Pupils are equal, round, and reactive to light.   Cardiovascular:      Rate and Rhythm: Normal rate. Rhythm irregular.      Pulses: Normal pulses.      Heart sounds: No murmur heard.  Pulmonary:      Effort: Pulmonary effort is normal. No respiratory distress.      Breath sounds: Normal breath sounds. No wheezing, rhonchi or rales.   Chest:      Chest wall: No tenderness.   Abdominal:      General: Bowel sounds are normal. There is no distension.      Palpations: Abdomen is soft.      Tenderness: There is no abdominal tenderness. There is no right CVA tenderness, left CVA tenderness, guarding or rebound.   Musculoskeletal:         General: No swelling or tenderness. Normal range of motion.      Cervical back: Normal range of motion and neck supple.      Right lower leg: No edema.      Left lower leg: No edema.   Skin:     General: Skin is warm and dry.   Neurological:      General: No focal deficit present.      Mental Status: He is alert and oriented to person, place, and time.   Psychiatric:         Mood and Affect: Mood normal.         Behavior: Behavior normal.         Thought Content: Thought content normal.         Judgment: Judgment normal.        Assessment and Plan (including Health Maintenance)      Problem List  Smart Sets  Document Outside HM   :    Plan:     Labs from ED visit reviewed.     Health Maintenance Due   Topic Date Due    Hepatitis C Screening  Never done    Lipid Panel  10/27/2022       Problem List Items Addressed This Visit          Cardiac/Vascular "    Typical atrial flutter     Other Visit Diagnoses       Nausea    -  Primary    Relevant Medications    ondansetron (ZOFRAN) 4 MG tablet    ondansetron injection 4 mg (Completed)    H/O [place] spotted fever        Relevant Medications    doxycycline (VIBRAMYCIN) 100 MG Cap          Nausea  -     ondansetron (ZOFRAN) 4 MG tablet; Take 1 tablet (4 mg total) by mouth every 8 (eight) hours as needed for Nausea.  Dispense: 30 tablet; Refill: 1  -     Discontinue: ondansetron injection 8 mg  -     ondansetron injection 4 mg    H/O [place] spotted fever  -     doxycycline (VIBRAMYCIN) 100 MG Cap; Take 1 capsule (100 mg total) by mouth every 12 (twelve) hours.  Dispense: 20 capsule; Refill: 0    Typical atrial flutter       Health Maintenance Topics with due status: Not Due       Topic Last Completion Date    Colonoscopy 06/30/2014         Future Appointments   Date Time Provider Department Center   1/11/2023 10:15 AM James Hughes MD [facility]   4/10/2023  1:30 PM Raymon Tyler MD [facility] [place]   10/27/2023  2:00 PM AWV NURSE, [facility] FAMILY MEDICINE [facility] [place]        Follow up if symptoms worsen or fail to improve.     Signature:  Jackie Roajs DNP, FNP  [address] [place] [address]  Phone #: [phone]  Fax #: [phone]    Date of encounter: 12/14/22    Patient Instructions   Clear/full liquids in small amounts. Take meds as prescribed. Follow up if symptoms persist.

## 2023-01-09 ENCOUNTER — OUTSIDE PLACE OF SERVICE (OUTPATIENT)
Dept: CARDIOLOGY | Facility: HOSPITAL | Age: 78
End: 2023-01-09
Payer: MEDICARE

## 2023-01-09 PROCEDURE — 93010 ELECTROCARDIOGRAM REPORT: CPT | Mod: ,,, | Performed by: INTERNAL MEDICINE

## 2023-01-09 PROCEDURE — 93010 PR ELECTROCARDIOGRAM REPORT: ICD-10-PCS | Mod: ,,, | Performed by: INTERNAL MEDICINE

## 2023-01-10 ENCOUNTER — TELEPHONE (OUTPATIENT)
Dept: FAMILY MEDICINE | Facility: CLINIC | Age: 78
End: 2023-01-10
Payer: MEDICARE

## 2023-01-10 NOTE — TELEPHONE ENCOUNTER
Pts wife called and stated that she had to take pt to the ER yesterday due to him not eating latey. They did a test and found a spot on his pancreas. She wanted to let you know of the situation and was asking what they should do next. Please advise.

## 2023-01-12 ENCOUNTER — OFFICE VISIT (OUTPATIENT)
Dept: FAMILY MEDICINE | Facility: CLINIC | Age: 78
End: 2023-01-12
Payer: MEDICARE

## 2023-01-12 VITALS
WEIGHT: 233.63 LBS | HEIGHT: 73 IN | OXYGEN SATURATION: 99 % | DIASTOLIC BLOOD PRESSURE: 60 MMHG | BODY MASS INDEX: 30.96 KG/M2 | SYSTOLIC BLOOD PRESSURE: 99 MMHG | HEART RATE: 66 BPM

## 2023-01-12 DIAGNOSIS — I10 HYPERTENSION, ESSENTIAL: Chronic | ICD-10-CM

## 2023-01-12 DIAGNOSIS — I25.10 CORONARY ARTERY DISEASE INVOLVING NATIVE CORONARY ARTERY OF NATIVE HEART WITHOUT ANGINA PECTORIS: Chronic | ICD-10-CM

## 2023-01-12 DIAGNOSIS — I25.5 GENERALIZED ISCHEMIC MYOCARDIAL DYSFUNCTION: Chronic | ICD-10-CM

## 2023-01-12 DIAGNOSIS — K21.9 GASTROESOPHAGEAL REFLUX DISEASE WITHOUT ESOPHAGITIS: Chronic | ICD-10-CM

## 2023-01-12 DIAGNOSIS — R53.82 CHRONIC FATIGUE: Chronic | ICD-10-CM

## 2023-01-12 DIAGNOSIS — J45.40 MODERATE PERSISTENT ASTHMA WITHOUT COMPLICATION: Chronic | ICD-10-CM

## 2023-01-12 DIAGNOSIS — I25.5 ISCHEMIC CARDIOMYOPATHY: Chronic | ICD-10-CM

## 2023-01-12 DIAGNOSIS — E78.2 MIXED HYPERLIPIDEMIA: Chronic | ICD-10-CM

## 2023-01-12 DIAGNOSIS — I35.8 AORTIC HEART MURMUR: Chronic | ICD-10-CM

## 2023-01-12 DIAGNOSIS — C61 PROSTATE CANCER: Chronic | ICD-10-CM

## 2023-01-12 DIAGNOSIS — Z95.1 STATUS POST CORONARY ARTERY BYPASS GRAFT: Chronic | ICD-10-CM

## 2023-01-12 DIAGNOSIS — R11.0 NAUSEA IN ADULT: Chronic | ICD-10-CM

## 2023-01-12 DIAGNOSIS — K86.89 PANCREATIC MASS: Chronic | ICD-10-CM

## 2023-01-12 DIAGNOSIS — E66.9 OBESITY WITH BODY MASS INDEX 30 OR GREATER: Chronic | ICD-10-CM

## 2023-01-12 DIAGNOSIS — K83.8 COMMON BILE DUCT MASS: Primary | Chronic | ICD-10-CM

## 2023-01-12 DIAGNOSIS — M19.011 OSTEOARTHRITIS OF RIGHT SHOULDER, UNSPECIFIED OSTEOARTHRITIS TYPE: Chronic | ICD-10-CM

## 2023-01-12 DIAGNOSIS — R63.4 UNEXPLAINED WEIGHT LOSS: ICD-10-CM

## 2023-01-12 DIAGNOSIS — N28.9 ABNORMAL KIDNEY FUNCTION: Chronic | ICD-10-CM

## 2023-01-12 PROBLEM — Z99.81 OXYGEN DEPENDENT: Chronic | Status: RESOLVED | Noted: 2022-11-01 | Resolved: 2023-01-12

## 2023-01-12 PROBLEM — I48.3 TYPICAL ATRIAL FLUTTER: Chronic | Status: ACTIVE | Noted: 2022-12-13

## 2023-01-12 PROBLEM — H61.23 IMPACTED CERUMEN OF BOTH EARS: Status: RESOLVED | Noted: 2022-11-01 | Resolved: 2023-01-12

## 2023-01-12 PROCEDURE — 99214 OFFICE O/P EST MOD 30 MIN: CPT | Mod: ,,, | Performed by: FAMILY MEDICINE

## 2023-01-12 PROCEDURE — 99214 PR OFFICE/OUTPT VISIT, EST, LEVL IV, 30-39 MIN: ICD-10-PCS | Mod: ,,, | Performed by: FAMILY MEDICINE

## 2023-01-12 RX ORDER — ONDANSETRON 4 MG/1
4 TABLET, ORALLY DISINTEGRATING ORAL EVERY 4 HOURS PRN
COMMUNITY
Start: 2023-01-09 | End: 2023-01-12

## 2023-01-12 NOTE — PROGRESS NOTES
Raymon Tyler MD    [address] [place] [address]     PATIENT NAME: Tiny Gutierrez  : 1945  DATE: 23  MRN: [id number]      Billing Provider: Raymon Tyelr MD  Level of Service: [place] OFFICE/OUTPT VISIT, EST, LEVL IV, 30-39 MIN  Patient PCP Information       Provider PCP Type    Raymon Tyler MD General            Reason for Visit / Chief Complaint: Follow-up (Pt went to ER at [facility] Monday night for sickenss with eating. They ran a test that showed a spot on his pancrease that is causing it. It has been going on a while but he is just now getting seen for it. He is wanting to get a referall for a good doctor in [place]. )       Update PCP  Update Chief Complaint         History of Present Illness / Problem Focused Workflow     Tiny Gutierrez presents to the clinic with Follow-up (Pt went to ER at [facility] Monday night for sickenss with eating. They ran a test that showed a spot on his pancrease that is causing it. It has been going on a while but he is just now getting seen for it. He is wanting to get a referall for a good doctor in [place]. 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 Cholangiocarciona vs Pancreatic cancer are on the list of concerns. He asks to see a physician in [place].     HPI    Review of Systems     Review of Systems   Constitutional:  Positive for appetite change, fatigue and unexpected weight change. Negative for activity change, chills and fever.   HENT:  Negative for nasal congestion, ear pain, hearing loss, postnasal drip and sore throat.    Respiratory:  Negative for cough, chest tightness, shortness of breath and wheezing.    Cardiovascular:  Negative for chest pain, palpitations, leg swelling and claudication.   Gastrointestinal:  Positive for nausea and vomiting. Negative for  abdominal pain, change in bowel habit, constipation, diarrhea and change in bowel habit.   Genitourinary:  Negative for dysuria.   Musculoskeletal:  Negative for arthralgias, back pain, gait problem and myalgias.   Integumentary:  Negative for rash.   Neurological:  Positive for weakness. Negative for headaches.   Psychiatric/Behavioral:  Negative for suicidal ideas. The patient is not nervous/anxious.       Medical / Social / Family History     Past Medical History:   Diagnosis Date    Aortic heart murmur     Asthma     Cancer     Emphysema/COPD     Hyperlipidemia     Ischemic cardiomyopathy     Mitral regurgitation     Old myocardial infarction 03/2001    Tricuspid regurgitation     Vitamin B 12 deficiency     Vitamin D deficiency        Past Surgical History:   Procedure Laterality Date    CARDIAC CATHETERIZATION      CARDIAC DEFIBRILLATOR PLACEMENT      CHOLECYSTECTOMY      CORONARY ARTERY BYPASS GRAFT      TOTAL KNEE ARTHROPLASTY Bilateral        Social History    reports that he has never smoked. He has never used smokeless tobacco. He reports that he does not drink alcohol and does not use drugs.    Family History  's family history includes Emphysema in his father.    Medications and Allergies     Medications  Outpatient Medications Marked as Taking for the 1/12/23 encounter (Office Visit) with Raymon Tyler MD   Medication Sig Dispense Refill    albuterol (PROVENTIL) 2.5 mg /3 mL (0.083 %) nebulizer solution 3 ml      albuterol (PROVENTIL/VENTOLIN HFA) 90 mcg/actuation inhaler 2 puffs as needed      apixaban (ELIQUIS) 5 mg Tab Take 1 tablet (5 mg total) by mouth 2 (two) times daily. 60 tablet 1    aspirin (ECOTRIN) 81 MG EC tablet Take 81 mg by mouth once daily.      atorvastatin (LIPITOR) 40 MG tablet Take 1 tablet by mouth once daily 90 tablet 0    BREO ELLIPTA 200-25 mcg/dose DsDv diskus inhaler 1 puff once daily.      calcium carbonate/vitamin D3 (CALTRATE 600 + D ORAL) Take 1 capsule by  mouth Daily.      clopidogreL (PLAVIX) 75 mg tablet Take 1 tablet (75 mg total) by mouth once daily. 90 tablet 3    furosemide (LASIX) 20 MG tablet Take 1 tablet by mouth once daily 90 tablet 0    leuprolide (ELIGARD) 7.5 mg (1 month) Syrg as directed      metoprolol tartrate (LOPRESSOR) 25 MG tablet Take 1/2 (one-half) tablet by mouth once daily 45 tablet 0    mometasone (NASONEX) 50 mcg/actuation nasal spray 2 sprays in each nostril      naproxen-diphenhydramine (ALEVE PM) 220-25 mg Tab       omega 3-dha-epa-fish oil 1,000 mg (120 mg-180 mg) Cap 1 capsule      omeprazole (PRILOSEC) 20 MG capsule TAKE 1 CAPSULE BY MOUTH ONCE DAILY FOR STOMACH 90 capsule 1    ondansetron (ZOFRAN) 4 MG tablet Take 1 tablet (4 mg total) by mouth every 8 (eight) hours as needed for Nausea. 30 tablet 1       Allergies  Review of patient's allergies indicates:   Allergen Reactions    Pollen extracts        Physical Examination     Vitals:    01/12/23 1033   BP: 99/60   Pulse: 66     Physical Exam  Vitals and nursing note reviewed.   Constitutional:       General: He is not in acute distress.     Appearance: Normal appearance. He is not ill-appearing.   Eyes:      Extraocular Movements: Extraocular movements intact.      Pupils: Pupils are equal, round, and reactive to light.   Cardiovascular:      Rate and Rhythm: Normal rate and regular rhythm.      Heart sounds: Normal heart sounds.   Pulmonary:      Effort: Pulmonary effort is normal.      Breath sounds: Normal breath sounds.   Abdominal:      General: Bowel sounds are normal.      Palpations: Abdomen is soft.   Musculoskeletal:         General: Normal range of motion.   Skin:     Findings: No rash.   Neurological:      General: No focal deficit present.      Mental Status: He is alert and oriented to person, place, and time. Mental status is at baseline.   Psychiatric:         Mood and Affect: Mood normal.         Behavior: Behavior normal.          Assessment and Plan (including  Health Maintenance)      Problem List  Smart Sets  Document Outside HM   :    Plan:         Health Maintenance Due   Topic Date Due    Hepatitis C Screening  Never done    Lipid Panel  10/27/2022       Problem List Items Addressed This Visit          Pulmonary    Moderate persistent asthma (Chronic)       Cardiac/Vascular    Hypertension, essential (Chronic)    Hyperlipidemia (Chronic)    Ischemic cardiomyopathy (Chronic)    Coronary artery disease involving native heart without angina pectoris (Chronic)    Aortic heart murmur (Chronic)    Status post coronary artery bypass graft (Chronic)    Overview     3 vessel          Generalized ischemic myocardial dysfunction (Chronic)       Renal/    Abnormal kidney function (Chronic)       Oncology    Prostate cancer (Chronic)       Endocrine    Obesity with body mass index 30 or greater (Chronic)       GI    Gastroesophageal reflux disease (Chronic)    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 Juancho Yan at [facility] in [place].          Relevant Orders    Ambulatory referral/consult to General Surgery    Pancreatic mass (Chronic)    Relevant Orders    Ambulatory referral/consult to General Surgery       Orthopedic    Osteoarthritis of right shoulder (Chronic)       Other    Fatigue (Chronic)     Other Visit Diagnoses       Nausea in adult  (Chronic)       Unexplained weight loss        Relevant Orders    Ambulatory referral/consult to General Surgery          Common bile duct mass  -     Ambulatory referral/consult to General Surgery; Future; Expected date: 01/19/2023    Nausea in adult    Unexplained weight loss  -     Ambulatory referral/consult to General Surgery; Future; Expected date: 01/19/2023    Pancreatic mass  -     Ambulatory referral/consult to General Surgery; Future; Expected date: 01/19/2023    Hypertension, essential    Mixed hyperlipidemia    Ischemic cardiomyopathy    Coronary artery disease  involving native coronary artery of native heart without angina pectoris    Moderate persistent asthma without complication    Aortic heart murmur    Status post coronary artery bypass graft    Abnormal kidney function    Generalized ischemic myocardial dysfunction    Prostate cancer    Obesity with body mass index 30 or greater    Gastroesophageal reflux disease without esophagitis    Osteoarthritis of right shoulder, unspecified osteoarthritis type    Chronic fatigue       Health Maintenance Topics with due status: Not Due       Topic Last Completion Date    Colonoscopy 06/30/2014       Procedures     Future Appointments   Date Time Provider Department Center   2/3/2023 10:15 AM James Hughes MD [facility]   4/10/2023  1:30 PM Raymon Tyler MD [facility]   10/27/2023  2:00 PM AWV NURSE, [facility]        No follow-ups on file.     Signature:  Raymon Tyler MD  [address] [place] [address]  Phone #: [phone]  Fax #: [phone]    Date of encounter: 1/12/23    There are no Patient Instructions on file for this visit.

## 2023-01-13 ENCOUNTER — TELEPHONE (OUTPATIENT)
Dept: FAMILY MEDICINE | Facility: CLINIC | Age: 78
End: 2023-01-13
Payer: MEDICARE

## 2023-01-13 NOTE — TELEPHONE ENCOUNTER
Called pt wife and let her know they will send his records when they make referral appointment to Dr. Leiva.

## 2023-01-13 NOTE — TELEPHONE ENCOUNTER
----- Message from Liz Herrera sent at 1/13/2023  9:53 AM CST -----  Pt is wanting to know is you had sent of the records to [facility]/checking on appt. Please call pt back @ [phone]

## 2023-02-07 RX ORDER — METOPROLOL TARTRATE 25 MG/1
12.5 TABLET, FILM COATED ORAL DAILY
Qty: 45 TABLET | Refills: 1 | Status: SHIPPED | OUTPATIENT
Start: 2023-02-07 | End: 2023-03-08

## 2023-02-07 RX ORDER — FUROSEMIDE 20 MG/1
20 TABLET ORAL DAILY
Qty: 90 TABLET | Refills: 1 | Status: SHIPPED | OUTPATIENT
Start: 2023-02-07 | End: 2023-03-09 | Stop reason: SDUPTHER

## 2023-02-07 RX ORDER — CLOPIDOGREL BISULFATE 75 MG/1
75 TABLET ORAL DAILY
Qty: 90 TABLET | Refills: 1 | Status: SHIPPED | OUTPATIENT
Start: 2023-02-07 | End: 2023-05-25

## 2023-02-07 RX ORDER — ATORVASTATIN CALCIUM 40 MG/1
40 TABLET, FILM COATED ORAL DAILY
Qty: 90 TABLET | Refills: 1 | Status: SHIPPED | OUTPATIENT
Start: 2023-02-07 | End: 2023-10-19 | Stop reason: SDUPTHER

## 2023-02-16 ENCOUNTER — OFFICE VISIT (OUTPATIENT)
Dept: CARDIOLOGY | Facility: CLINIC | Age: 78
End: 2023-02-16
Payer: MEDICARE

## 2023-02-16 ENCOUNTER — HOSPITAL ENCOUNTER (OUTPATIENT)
Dept: RADIOLOGY | Facility: HOSPITAL | Age: 78
Discharge: HOME | End: 2023-02-16
Attending: INTERNAL MEDICINE
Payer: MEDICARE

## 2023-02-16 ENCOUNTER — HOSPITAL ENCOUNTER (OUTPATIENT)
Dept: CARDIOLOGY | Facility: HOSPITAL | Age: 78
Discharge: HOME | End: 2023-02-16
Attending: INTERNAL MEDICINE
Payer: MEDICARE

## 2023-02-16 DIAGNOSIS — Z01.818 OTHER SPECIFIED PRE-OPERATIVE EXAMINATION: ICD-10-CM

## 2023-02-16 DIAGNOSIS — I25.5 ISCHEMIC CARDIOMYOPATHY: Chronic | ICD-10-CM

## 2023-02-16 DIAGNOSIS — Z95.810 PRESENCE OF DOUBLE CHAMBER AUTOMATIC CARDIOVERTER/DEFIBRILLATOR (AICD): ICD-10-CM

## 2023-02-16 DIAGNOSIS — I25.5 ISCHEMIC CARDIOMYOPATHY: ICD-10-CM

## 2023-02-16 DIAGNOSIS — Z95.810 PRESENCE OF DOUBLE CHAMBER AUTOMATIC CARDIOVERTER/DEFIBRILLATOR (AICD): Primary | ICD-10-CM

## 2023-02-16 DIAGNOSIS — E78.2 MIXED HYPERLIPIDEMIA: Chronic | ICD-10-CM

## 2023-02-16 DIAGNOSIS — I25.10 CORONARY ARTERY DISEASE INVOLVING NATIVE CORONARY ARTERY OF NATIVE HEART WITHOUT ANGINA PECTORIS: Primary | Chronic | ICD-10-CM

## 2023-02-16 DIAGNOSIS — I10 HYPERTENSION, ESSENTIAL: Chronic | ICD-10-CM

## 2023-02-16 DIAGNOSIS — I48.92 ATRIAL FLUTTER, UNSPECIFIED TYPE: Chronic | ICD-10-CM

## 2023-02-16 DIAGNOSIS — I25.10 CORONARY ARTERY DISEASE INVOLVING NATIVE CORONARY ARTERY OF NATIVE HEART WITHOUT ANGINA PECTORIS: ICD-10-CM

## 2023-02-16 PROCEDURE — 93010 EKG 12-LEAD: ICD-10-PCS | Mod: S$PBB,59,ICN, | Performed by: INTERNAL MEDICINE

## 2023-02-16 PROCEDURE — 71046 X-RAY EXAM CHEST 2 VIEWS: CPT | Mod: 26,,, | Performed by: RADIOLOGY

## 2023-02-16 PROCEDURE — 93005 ELECTROCARDIOGRAM TRACING: CPT | Mod: PBBFAC,59 | Performed by: INTERNAL MEDICINE

## 2023-02-16 PROCEDURE — 99214 OFFICE O/P EST MOD 30 MIN: CPT | Mod: S$PBB,,, | Performed by: INTERNAL MEDICINE

## 2023-02-16 PROCEDURE — 99214 PR OFFICE/OUTPT VISIT, EST, LEVL IV, 30-39 MIN: ICD-10-PCS | Mod: S$PBB,,, | Performed by: INTERNAL MEDICINE

## 2023-02-16 PROCEDURE — 93010 ELECTROCARDIOGRAM REPORT: CPT | Mod: S$PBB,59,ICN, | Performed by: INTERNAL MEDICINE

## 2023-02-16 PROCEDURE — 93283 PRGRMG EVAL IMPLANTABLE DFB: CPT | Mod: 26,,, | Performed by: INTERNAL MEDICINE

## 2023-02-16 PROCEDURE — 71046 XR CHEST PA AND LATERAL: ICD-10-PCS | Mod: 26,,, | Performed by: RADIOLOGY

## 2023-02-16 PROCEDURE — 71046 X-RAY EXAM CHEST 2 VIEWS: CPT | Mod: TC

## 2023-02-16 PROCEDURE — 93283 CARDIAC DEVICE CHECK - IN CLINIC & HOSPITAL: ICD-10-PCS | Mod: 26,,, | Performed by: INTERNAL MEDICINE

## 2023-02-16 PROCEDURE — 93283 PRGRMG EVAL IMPLANTABLE DFB: CPT

## 2023-02-16 PROCEDURE — 99214 OFFICE O/P EST MOD 30 MIN: CPT | Mod: PBBFAC,25 | Performed by: INTERNAL MEDICINE

## 2023-02-17 ENCOUNTER — HOSPITAL ENCOUNTER (OUTPATIENT)
Dept: CARDIOLOGY | Facility: HOSPITAL | Age: 78
Discharge: HOME | End: 2023-02-17
Attending: INTERNAL MEDICINE
Payer: MEDICARE

## 2023-02-17 VITALS — HEIGHT: 73 IN | WEIGHT: 233 LBS | BODY MASS INDEX: 30.88 KG/M2

## 2023-02-17 DIAGNOSIS — Z01.818 OTHER SPECIFIED PRE-OPERATIVE EXAMINATION: ICD-10-CM

## 2023-02-17 DIAGNOSIS — I25.5 ISCHEMIC CARDIOMYOPATHY: ICD-10-CM

## 2023-02-17 DIAGNOSIS — I25.10 CORONARY ARTERY DISEASE INVOLVING NATIVE CORONARY ARTERY OF NATIVE HEART WITHOUT ANGINA PECTORIS: ICD-10-CM

## 2023-02-17 LAB
BATTERY VOLTAGE (V): 2.99 V
ERI (V): 2.73 V
OHS CV DC PP MS1: 0.4 MS
OHS CV DC PP MS2: 0.4 MS
OHS CV DC PP V1: 1.5 V
OHS CV DC PP V2: 2 V

## 2023-02-17 PROCEDURE — 93306 TTE W/DOPPLER COMPLETE: CPT | Mod: 26,,, | Performed by: INTERNAL MEDICINE

## 2023-02-17 PROCEDURE — 93306 TTE W/DOPPLER COMPLETE: CPT

## 2023-02-17 PROCEDURE — 93306 ECHO (CUPID ONLY): ICD-10-PCS | Mod: 26,,, | Performed by: INTERNAL MEDICINE

## 2023-02-20 LAB
AORTIC VALVE CUSP SEPERATION: 1.28 CM
AV INDEX (PROSTH): 0.5
AV MEAN GRADIENT: 10 MMHG
AV PEAK GRADIENT: 19 MMHG
AV VALVE AREA: 1.52 CM2
AV VELOCITY RATIO: 0.55
BSA FOR ECHO PROCEDURE: 2.33 M2
CV ECHO LV RWT: 0.3 CM
DOP CALC AO PEAK VEL: 2.2 M/S
DOP CALC AO VTI: 50.25 CM
DOP CALC LVOT AREA: 3 CM2
DOP CALC LVOT DIAMETER: 1.97 CM
DOP CALC LVOT PEAK VEL: 1.2 M/S
DOP CALC LVOT STROKE VOLUME: 76.28 CM3
DOP CALCLVOT PEAK VEL VTI: 25.04 CM
E WAVE DECELERATION TIME: 479 MSEC
ECHO LV POSTERIOR WALL: 0.86 CM (ref 0.6–1.1)
EJECTION FRACTION: 40 %
FRACTIONAL SHORTENING: 19 % (ref 28–44)
INTERVENTRICULAR SEPTUM: 1.14 CM (ref 0.6–1.1)
LEFT ATRIUM SIZE: 5.4 CM
LEFT INTERNAL DIMENSION IN SYSTOLE: 4.58 CM (ref 2.1–4)
LEFT VENTRICLE DIASTOLIC VOLUME INDEX: 68.47 ML/M2
LEFT VENTRICLE DIASTOLIC VOLUME: 157.48 ML
LEFT VENTRICLE MASS INDEX: 97 G/M2
LEFT VENTRICLE SYSTOLIC VOLUME INDEX: 41.9 ML/M2
LEFT VENTRICLE SYSTOLIC VOLUME: 96.35 ML
LEFT VENTRICULAR INTERNAL DIMENSION IN DIASTOLE: 5.66 CM (ref 3.5–6)
LEFT VENTRICULAR MASS: 223.69 G
LVOT MG: 2.6 MMHG
MV PEAK E VEL: 0.49 M/S
PISA TR MAX VEL: 2.5 M/S
RA PRESSURE: 3 MMHG
RIGHT ATRIAL AREA: 17 CM2
RIGHT VENTRICULAR END-DIASTOLIC DIMENSION: 3.2 CM
TR MAX PG: 25 MMHG
TV REST PULMONARY ARTERY PRESSURE: 28 MMHG

## 2023-02-21 VITALS
HEART RATE: 72 BPM | RESPIRATION RATE: 12 BRPM | HEIGHT: 73 IN | DIASTOLIC BLOOD PRESSURE: 66 MMHG | WEIGHT: 233.5 LBS | BODY MASS INDEX: 30.95 KG/M2 | SYSTOLIC BLOOD PRESSURE: 106 MMHG

## 2023-02-21 NOTE — PROGRESS NOTES
[facility] note        DATE OF SERVICE: 03/06/2023       PCP: Raymon Tyler MD      CHIEF COMPLAINT:   Chief Complaint   Patient presents with    Follow-up     Needs surgery clearance for Whipple surgery.    Edema     Unchanged,.    Shortness of Breath     Unchanged.         HISTORY OF PRESENT ILLNESS:  Tiny Gutierrez is a 77 y.o. male with a PMH of  CAD, ischemic cardiomyopathy s/p ICD, htn, hld, asthma, emphysema, prostate cancer, possible pancreatic cancer, and recent atrial flutter.        Review of Systems: Review of Systems   Respiratory:  Positive for shortness of breath.    Cardiovascular:  Positive for leg swelling. Negative for chest pain and palpitations.   Neurological:  Negative for loss of consciousness.      PAST MEDICAL HISTORY:  Past Medical History:   Diagnosis Date    Aortic heart murmur     Asthma     Cancer     Emphysema/COPD     Hyperlipidemia     Ischemic cardiomyopathy     Mitral regurgitation     Old myocardial infarction 03/2001    Tricuspid regurgitation     Vitamin B 12 deficiency     Vitamin D deficiency        PAST SURGICAL HISTORY:  Past Surgical History:   Procedure Laterality Date    CARDIAC CATHETERIZATION      CARDIAC DEFIBRILLATOR PLACEMENT      CHOLECYSTECTOMY      CORONARY ARTERY BYPASS GRAFT      TOTAL KNEE ARTHROPLASTY Bilateral        SOCIAL HISTORY:  Social History     Socioeconomic History    Marital status:    Tobacco Use    Smoking status: Never    Smokeless tobacco: Never   Substance and Sexual Activity    Alcohol use: Never    Drug use: Never    Sexual activity: Yes     Partners: Female       FAMILY HISTORY:  Family History   Problem Relation Age of Onset    Emphysema Father          ALLERGIES:  Review of patient's allergies indicates:   Allergen Reactions    Pollen extracts         MEDICATIONS:    Current Outpatient Medications:     albuterol (PROVENTIL/VENTOLIN HFA) 90 mcg/actuation inhaler, 2 puffs as needed, Disp: , Rfl:     aspirin  "(ECOTRIN) 81 MG EC tablet, Take 81 mg by mouth once daily., Disp: , Rfl:     atorvastatin (LIPITOR) 40 MG tablet, Take 1 tablet (40 mg total) by mouth once daily., Disp: 90 tablet, Rfl: 1    BREO ELLIPTA 200-25 mcg/dose DsDv diskus inhaler, 1 puff once daily., Disp: , Rfl:     calcium carbonate/vitamin D3 (CALTRATE 600 + D ORAL), Take 1 capsule by mouth Daily., Disp: , Rfl:     clopidogreL (PLAVIX) 75 mg tablet, Take 1 tablet (75 mg total) by mouth once daily., Disp: 90 tablet, Rfl: 1    furosemide (LASIX) 20 MG tablet, Take 1 tablet (20 mg total) by mouth once daily., Disp: 90 tablet, Rfl: 1    leuprolide (ELIGARD) 7.5 mg (1 month) Syrg, as directed, Disp: , Rfl:     naproxen-diphenhydramine (ALEVE PM) 220-25 mg Tab, , Disp: , Rfl:     omega 3-dha-epa-fish oil 1,000 mg (120 mg-180 mg) Cap, 1 capsule, Disp: , Rfl:     omeprazole (PRILOSEC) 20 MG capsule, TAKE 1 CAPSULE BY MOUTH ONCE DAILY FOR STOMACH, Disp: 90 capsule, Rfl: 1    ondansetron (ZOFRAN) 4 MG tablet, Take 1 tablet (4 mg total) by mouth every 8 (eight) hours as needed for Nausea., Disp: 30 tablet, Rfl: 1    albuterol (PROVENTIL) 2.5 mg /3 mL (0.083 %) nebulizer solution, 3 ml, Disp: , Rfl:     metoprolol succinate (TOPROL-XL) 25 MG 24 hr tablet, 1/2 tablet, Disp: , Rfl:     metoprolol tartrate (LOPRESSOR) 25 MG tablet, Take 0.5 tablets (12.5 mg total) by mouth once daily., Disp: 45 tablet, Rfl: 1    mometasone (NASONEX) 50 mcg/actuation nasal spray, 2 sprays in each nostril, Disp: , Rfl:     triamcinolone acetonide (NASACORT AQ NASL), , Disp: , Rfl:      PHYSICAL EXAM:  /66 (BP Location: Left arm, Patient Position: Sitting)   Pulse 72   Resp 12   Ht 6' 1" (1.854 m)   Wt 105.9 kg (233 lb 8 oz)   BMI 30.81 kg/m²   Wt Readings from Last 3 Encounters:   02/17/23 105.7 kg (233 lb)   02/16/23 105.9 kg (233 lb 8 oz)   01/12/23 106 kg (233 lb 9.6 oz)      Body mass index is 30.81 kg/m².    Physical Exam  Vitals reviewed.   Constitutional:       " Appearance: Normal appearance.   HENT:      Head: Normocephalic and atraumatic.   Neck:      Vascular: No carotid bruit or JVD.   Cardiovascular:      Rate and Rhythm: Normal rate and regular rhythm.      Pulses: Normal pulses.           Radial pulses are 2+ on the right side and 2+ on the left side.      Heart sounds: Normal heart sounds.      Comments: II/ VI HSM   Pulmonary:      Effort: Pulmonary effort is normal.      Breath sounds: Normal breath sounds.   Musculoskeletal:      Right lower leg: No edema.      Left lower leg: No edema.   Skin:     General: Skin is warm and dry.   Neurological:      Mental Status: He is alert and oriented to person, place, and time.       LABS REVIEWED:  Lab Results   Component Value Date    WBC 4.54 2022    RBC 4.24 (L) 2022    HGB 13.4 (L) 2022    HCT 40.6 2022    MCV 95.8 2022    MCH 31.6 (H) 2022    MCHC 33.0 2022    RDW 14.6 (H) 2022     2022    MPV 9.2 (L) 2022    NRBC 0.0 2022     Lab Results   Component Value Date     2022    K 4.2 2022     2022    CO2 29 2022    BUN 18 2022    MG 2.0 2022     Lab Results   Component Value Date     (H) 2022     (H) 2022     Lab Results   Component Value Date    GLU 92 2022     Lab Results   Component Value Date    CHOL 185 10/27/2021    HDL 54 10/27/2021    TRIG 97 10/27/2021    CHOLHDL 3.4 10/27/2021       CARDIAC STUDIES REVIEWED:  EK/16/23--NSR, possible inferior/ anterolateral infarct- age undetermined, 67 bpm  atrial-paced rhythm, 56 bpm    LHC:    3/20/15--3 V CAD with patent grafts to the LAD and obtuse marginal.  Moderate LV systolic dysfunction.  Normal LV hemodynamics.     3/28/07--3 V CAD.  Segmental wall motion abnormality with severely reduced LVSF, EF 20%.     CABG:    3/29/07--LIMA to LAD, SVG to RCA and OM.    ICD:    10/3/19--R/R end of life ICD with new MRI  compatible ICD.     8/8/13--R/ R end of life ICD with new dual chamber ICD.    1/15/09--implantation of dual chamber PPM ICD.        ASSESSMENT:    Active Problem List with Overview Notes    Diagnosis Date Noted    Common bile duct mass 01/12/2023    Pancreatic mass 01/12/2023    Atrial flutter 12/13/2022    Muscle spasm of back 11/05/2022    Artificial knee joint present 11/01/2022    Moderate persistent asthma 11/01/2022    Non-smoker 11/01/2022    Obesity with body mass index 30 or greater 11/01/2022    Status post coronary artery bypass graft 11/01/2022     3 vessel       Atherosclerotic heart disease of native coronary artery without angina pectoris 11/01/2022    Arteriosclerosis of coronary artery 11/01/2022    Generalized ischemic myocardial dysfunction 11/01/2022    Chronic right-sided low back pain without sciatica 11/01/2022    Prostate cancer 06/27/2022    Chronic right shoulder pain 04/08/2022    Osteoarthritis of right shoulder 04/08/2022    Aortic heart murmur 11/19/2021    Chronic obstructive pulmonary disease 11/19/2021    Ischemic cardiomyopathy 10/26/2021    Coronary artery disease involving native heart without angina pectoris 10/26/2021    Fatigue 07/29/2021    Abnormal kidney function 07/29/2021    Hyperlipidemia     Knee pain 06/04/2021    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    Gastroesophageal reflux disease 05/27/2021    Back pain 05/27/2021    Hypertension, essential      VISIT DIAGNOSIS:  Coronary artery disease involving native coronary artery of native heart without angina pectoris  Comments:  s/p CABG  Orders:  -     EKG 12-lead; Future  -     Echo; Future  -     X-Ray Chest PA And Lateral; Future; Expected date: 02/16/2023    Ischemic cardiomyopathy  -     Echo; Future  -     X-Ray Chest PA And Lateral; Future; Expected date: 02/16/2023    Other specified pre-operative examination  -     Echo; Future  -     X-Ray Chest PA And Lateral; Future; Expected date: 02/16/2023    Hypertension,  essential    Mixed hyperlipidemia    Atrial flutter, unspecified type  Comments:  dx 12/15/22 in ER         PLAN:  Will determine surgical risk after tests.  Magnet will need to be placed over ICD during Whipple procedure.  Stop Plavix 5 days prior to surgery.    Orders Placed This Encounter   Procedures    X-Ray Chest PA And Lateral     Standing Status:   Future     Number of Occurrences:   1     Standing Expiration Date:   2/16/2024     Order Specific Question:   May the Radiologist modify the order per protocol to meet the clinical needs of the patient?     Answer:   Yes     Order Specific Question:   Release to patient     Answer:   Immediate    EKG 12-lead     Standing Status:   Future     Number of Occurrences:   1     Standing Expiration Date:   2/16/2024    Echo     Standing Status:   Future     Number of Occurrences:   1     Standing Expiration Date:   2/16/2024     Order Specific Question:   Color Doppler?     Answer:   Yes     Order Specific Question:   Release to patient     Answer:   Immediate      RTC 6 months.

## 2023-03-06 PROBLEM — I48.92 ATRIAL FLUTTER: Status: ACTIVE | Noted: 2022-12-13

## 2023-03-08 RX ORDER — METOPROLOL SUCCINATE 25 MG/1
TABLET, EXTENDED RELEASE ORAL
Status: CANCELLED | OUTPATIENT
Start: 2023-03-08

## 2023-03-20 RX ORDER — METOPROLOL TARTRATE 25 MG/1
25 TABLET, FILM COATED ORAL DAILY
Qty: 90 TABLET | Refills: 3 | Status: SHIPPED | OUTPATIENT
Start: 2023-03-20 | End: 2023-05-25

## 2023-03-20 RX ORDER — FUROSEMIDE 20 MG/1
20 TABLET ORAL DAILY
Qty: 90 TABLET | Refills: 3 | Status: SHIPPED | OUTPATIENT
Start: 2023-03-20 | End: 2023-05-05 | Stop reason: SDUPTHER

## 2023-04-10 ENCOUNTER — OFFICE VISIT (OUTPATIENT)
Dept: FAMILY MEDICINE | Facility: CLINIC | Age: 78
End: 2023-04-10
Payer: MEDICARE

## 2023-04-10 VITALS
BODY MASS INDEX: 29.55 KG/M2 | OXYGEN SATURATION: 97 % | TEMPERATURE: 99 F | HEART RATE: 64 BPM | HEIGHT: 73 IN | DIASTOLIC BLOOD PRESSURE: 70 MMHG | SYSTOLIC BLOOD PRESSURE: 112 MMHG | RESPIRATION RATE: 20 BRPM | WEIGHT: 223 LBS

## 2023-04-10 DIAGNOSIS — I48.4 ATYPICAL ATRIAL FLUTTER: Chronic | ICD-10-CM

## 2023-04-10 DIAGNOSIS — I25.10 ATHEROSCLEROSIS OF NATIVE CORONARY ARTERY OF NATIVE HEART WITHOUT ANGINA PECTORIS: Chronic | ICD-10-CM

## 2023-04-10 DIAGNOSIS — C25.7 MALIGNANT NEOPLASM OF OTHER PARTS OF PANCREAS: Chronic | ICD-10-CM

## 2023-04-10 DIAGNOSIS — F33.9 DEPRESSION, RECURRENT: Primary | Chronic | ICD-10-CM

## 2023-04-10 DIAGNOSIS — C24.0 CANCER OF COMMON BILE DUCT: Chronic | ICD-10-CM

## 2023-04-10 DIAGNOSIS — I50.22 CHRONIC SYSTOLIC (CONGESTIVE) HEART FAILURE: Chronic | ICD-10-CM

## 2023-04-10 DIAGNOSIS — J41.1 MUCOPURULENT CHRONIC BRONCHITIS: Chronic | ICD-10-CM

## 2023-04-10 PROBLEM — I48.92 ATRIAL FLUTTER: Chronic | Status: ACTIVE | Noted: 2022-12-13

## 2023-04-10 PROCEDURE — 99214 OFFICE O/P EST MOD 30 MIN: CPT | Mod: ,,, | Performed by: FAMILY MEDICINE

## 2023-04-10 PROCEDURE — 99214 PR OFFICE/OUTPT VISIT, EST, LEVL IV, 30-39 MIN: ICD-10-PCS | Mod: ,,, | Performed by: FAMILY MEDICINE

## 2023-04-10 RX ORDER — ESCITALOPRAM OXALATE 10 MG/1
10 TABLET ORAL
COMMUNITY
Start: 2023-04-07

## 2023-04-10 RX ORDER — HYDROCODONE BITARTRATE AND ACETAMINOPHEN 5; 325 MG/1; MG/1
1 TABLET ORAL EVERY 6 HOURS PRN
COMMUNITY
Start: 2023-03-22 | End: 2024-03-18

## 2023-04-10 RX ORDER — SUCRALFATE 1 G/1
1 TABLET ORAL 4 TIMES DAILY
COMMUNITY
Start: 2023-03-22 | End: 2023-07-10

## 2023-04-10 RX ORDER — CYANOCOBALAMIN (VITAMIN B-12) 500 MCG
3 TABLET ORAL
COMMUNITY
End: 2023-10-03

## 2023-04-10 RX ORDER — PROCHLORPERAZINE MALEATE 10 MG
10 TABLET ORAL EVERY 6 HOURS PRN
COMMUNITY
Start: 2023-04-07 | End: 2023-06-22 | Stop reason: SDUPTHER

## 2023-04-10 NOTE — PROGRESS NOTES
Raymon Tyler MD    [address] [place] [address]     PATIENT NAME: Tiny Gutierrez  : 1945  DATE: 4/10/23  MRN: [id number]      Billing Provider: Raymon Tyler MD  Level of Service: [place] OFFICE/OUTPT VISIT, EST, LEVL IV, 30-39 MIN  Patient PCP Information       Provider PCP Type    Raymon Tyler MD General            Reason for Visit / Chief Complaint: Follow-up (Here today for 6 month follow up. No other complaints. )       Update PCP  Update Chief Complaint         History of Present Illness / Problem Focused Workflow     Tiny Gutierrez presents to the clinic with Follow-up (Here today for 6 month follow up. No other complaints. )     He has struggled for the past few months, had a Whipple procedure for Pancreatic Cancer and will start chemotherapy in the near future. His main problem was severe nausea and vomiting. He was unable to eat much of anything for a long time. He does report this is some better over the past few weeks.     Dr. Juancho Yan did his surgery. He will see Oncology with [facility] as well.     HPI    Review of Systems     Review of Systems   Constitutional:  Negative for activity change, appetite change, chills, fatigue and fever.   HENT:  Negative for nasal congestion, ear pain, hearing loss, postnasal drip and sore throat.    Respiratory:  Negative for cough, chest tightness, shortness of breath and wheezing.    Cardiovascular:  Negative for chest pain, palpitations, leg swelling and claudication.   Gastrointestinal:  Negative for abdominal pain, change in bowel habit, constipation, diarrhea, nausea, vomiting and change in bowel habit.   Genitourinary:  Negative for dysuria.   Musculoskeletal:  Negative for arthralgias, back pain, gait problem and myalgias.   Integumentary:  Negative for rash.   Neurological:  Negative for weakness and headaches.   Psychiatric/Behavioral:  Negative for  suicidal ideas. The patient is not nervous/anxious.       Medical / Social / Family History     Past Medical History:   Diagnosis Date    Aortic heart murmur     Asthma     Cancer     Emphysema/COPD     Hyperlipidemia     Ischemic cardiomyopathy     Mitral regurgitation     Old myocardial infarction 03/2001    Tricuspid regurgitation     Vitamin B 12 deficiency     Vitamin D deficiency        Past Surgical History:   Procedure Laterality Date    CARDIAC CATHETERIZATION      CARDIAC DEFIBRILLATOR PLACEMENT      CHOLECYSTECTOMY      CORONARY ARTERY BYPASS GRAFT      TOTAL KNEE ARTHROPLASTY Bilateral        Social History    reports that he has never smoked. He has never been exposed to tobacco smoke. He has never used smokeless tobacco. He reports that he does not drink alcohol and does not use drugs.    Family History  's family history includes Emphysema in his father.    Medications and Allergies     Medications  Outpatient Medications Marked as Taking for the 4/10/23 encounter (Office Visit) with Raymon Tyler MD   Medication Sig Dispense Refill    albuterol (PROVENTIL) 2.5 mg /3 mL (0.083 %) nebulizer solution 3 ml      albuterol (PROVENTIL/VENTOLIN HFA) 90 mcg/actuation inhaler 2 puffs as needed      aspirin (ECOTRIN) 81 MG EC tablet Take 81 mg by mouth once daily.      atorvastatin (LIPITOR) 40 MG tablet Take 1 tablet (40 mg total) by mouth once daily. 90 tablet 1    BREO ELLIPTA 200-25 mcg/dose DsDv diskus inhaler 1 puff once daily.      calcium carbonate/vitamin D3 (CALTRATE 600 + D ORAL) Take 1 capsule by mouth Daily.      clopidogreL (PLAVIX) 75 mg tablet Take 1 tablet (75 mg total) by mouth once daily. 90 tablet 1    furosemide (LASIX) 20 MG tablet Take 1 tablet (20 mg total) by mouth once daily. 90 tablet 3    metoprolol tartrate (LOPRESSOR) 25 MG tablet Take 1 tablet (25 mg total) by mouth once daily. 90 tablet 3    mometasone (NASONEX) 50 mcg/actuation nasal spray 2 sprays in each nostril       naproxen-diphenhydramine (ALEVE PM) 220-25 mg Tab       omega 3-dha-epa-fish oil 1,000 mg (120 mg-180 mg) Cap 1 capsule      omeprazole (PRILOSEC) 20 MG capsule TAKE 1 CAPSULE BY MOUTH ONCE DAILY FOR STOMACH 90 capsule 1    ondansetron (ZOFRAN) 4 MG tablet Take 1 tablet (4 mg total) by mouth every 8 (eight) hours as needed for Nausea. 30 tablet 1    triamcinolone acetonide (NASACORT AQ NASL)          Allergies  Review of patient's allergies indicates:   Allergen Reactions    Pollen extracts     Hay fever and allergy relief Rash       Physical Examination     Vitals:    04/10/23 1301   BP: 112/70   Pulse: 64   Resp: 20   Temp: 98.6 °F (37 °C)     Physical Exam  Vitals and nursing note reviewed.   Constitutional:       General: He is not in acute distress.     Appearance: Normal appearance. He is not ill-appearing.   Eyes:      Extraocular Movements: Extraocular movements intact.      Pupils: Pupils are equal, round, and reactive to light.   Cardiovascular:      Rate and Rhythm: Normal rate and regular rhythm.      Heart sounds: Normal heart sounds.   Pulmonary:      Effort: Pulmonary effort is normal.      Breath sounds: Normal breath sounds.   Abdominal:      General: Bowel sounds are normal.      Palpations: Abdomen is soft.   Musculoskeletal:         General: Normal range of motion.   Skin:     Findings: No rash.   Neurological:      General: No focal deficit present.      Mental Status: He is alert and oriented to person, place, and time. Mental status is at baseline.   Psychiatric:         Mood and Affect: Mood normal.         Behavior: Behavior normal.          Assessment and Plan (including Health Maintenance)      Problem List  Smart Sets  Document Outside HM   :    Plan:         Health Maintenance Due   Topic Date Due    Hepatitis C Screening  Never done    Lipid Panel  10/27/2022       Problem List Items Addressed This Visit          Psychiatric    Depression, recurrent - Primary (Chronic)    Current  Assessment & Plan     His mood has been stable, considering all that he has been through, this is a fairly good report on his mental health. No changes in treatment               Pulmonary    Chronic obstructive pulmonary disease (Chronic)    Current Assessment & Plan     He remains chronically short of breath but stable, no changes in treatment today.               Cardiac/Vascular    Atherosclerotic heart disease of native coronary artery without angina pectoris (Chronic)    Current Assessment & Plan     Stable recently, continue current medications, and continue to follow up with Dr. Hughes as instructed            Atrial flutter (Chronic)    Current Assessment & Plan     This has been stable, continue to follow up with Cardiology.            Chronic systolic (congestive) heart failure (Chronic)    Current Assessment & Plan     Currently fairly stable, no change in treatment today. Continue to follow with Dr. Hughes as instructed               Oncology    Cancer of common bile duct (Chronic)    Overview     Being managed by Dr. Yan            Current Assessment & Plan     Continue to follow up with Dr. Yan as instructed            Malignant neoplasm of other parts of pancreas (Chronic)    Overview     Whipple procedure performed at [facility] in [place], [place]. By Dr. Juancho Yan. Early 2023            Current Assessment & Plan     He is doing better over the past few weeks. His severe nausea and vomiting has improved. He has a follow up with Oncology in the near future and will likely start chemotherapy at some point.               Health Maintenance Topics with due status: Not Due       Topic Last Completion Date    Colonoscopy 06/30/2014       Procedures     Future Appointments   Date Time Provider Department Center   5/17/2023 11:00 AM [facility] CV DEVICE CHECK OB [facility] [place] [facility]   9/11/2023 12:30 PM MANGO FatimaOBC CARD [place] PR[facility]LLA   10/27/2023  2:00 PM AWV NURSE, [facility]  FAMILY MEDICINE [facility] TIMOTHY Diaz        Follow up in about 6 months (around 10/10/2023) for chronic health problems.     Signature:  Raymon Tyler MD  [address] [place] [address]  Phone #: [phone]  Fax #: [phone]    Date of encounter: 4/10/23    There are no Patient Instructions on file for this visit.

## 2023-04-10 NOTE — ASSESSMENT & PLAN NOTE
Currently fairly stable, no change in treatment today. Continue to follow with Dr. Hughes as instructed

## 2023-04-10 NOTE — ASSESSMENT & PLAN NOTE
He is doing better over the past few weeks. His severe nausea and vomiting has improved. He has a follow up with Oncology in the near future and will likely start chemotherapy at some point.

## 2023-04-10 NOTE — ASSESSMENT & PLAN NOTE
Stable recently, continue current medications, and continue to follow up with Dr. Hughes as instructed

## 2023-04-10 NOTE — ASSESSMENT & PLAN NOTE
His mood has been stable, considering all that he has been through, this is a fairly good report on his mental health. No changes in treatment

## 2023-05-05 ENCOUNTER — OFFICE VISIT (OUTPATIENT)
Dept: FAMILY MEDICINE | Facility: CLINIC | Age: 78
End: 2023-05-05
Payer: MEDICARE

## 2023-05-05 ENCOUNTER — HOSPITAL ENCOUNTER (OUTPATIENT)
Dept: RADIOLOGY | Facility: HOSPITAL | Age: 78
Discharge: HOME | End: 2023-05-05
Attending: FAMILY MEDICINE
Payer: MEDICARE

## 2023-05-05 VITALS
RESPIRATION RATE: 20 BRPM | WEIGHT: 224 LBS | BODY MASS INDEX: 29.69 KG/M2 | OXYGEN SATURATION: 97 % | HEIGHT: 73 IN | HEART RATE: 78 BPM | DIASTOLIC BLOOD PRESSURE: 61 MMHG | SYSTOLIC BLOOD PRESSURE: 98 MMHG

## 2023-05-05 DIAGNOSIS — C25.7 MALIGNANT NEOPLASM OF OTHER PARTS OF PANCREAS: Chronic | ICD-10-CM

## 2023-05-05 DIAGNOSIS — I50.22 CHRONIC SYSTOLIC (CONGESTIVE) HEART FAILURE: Chronic | ICD-10-CM

## 2023-05-05 DIAGNOSIS — R06.02 SHORTNESS OF BREATH: ICD-10-CM

## 2023-05-05 DIAGNOSIS — I25.10 ATHEROSCLEROSIS OF NATIVE CORONARY ARTERY OF NATIVE HEART WITHOUT ANGINA PECTORIS: Chronic | ICD-10-CM

## 2023-05-05 DIAGNOSIS — I25.10 ARTERIOSCLEROSIS OF CORONARY ARTERY: Chronic | ICD-10-CM

## 2023-05-05 DIAGNOSIS — J41.1 MUCOPURULENT CHRONIC BRONCHITIS: Primary | Chronic | ICD-10-CM

## 2023-05-05 DIAGNOSIS — R60.9 EDEMA, UNSPECIFIED TYPE: ICD-10-CM

## 2023-05-05 PROCEDURE — 99214 PR OFFICE/OUTPT VISIT, EST, LEVL IV, 30-39 MIN: ICD-10-PCS | Mod: ,,, | Performed by: FAMILY MEDICINE

## 2023-05-05 PROCEDURE — 71046 X-RAY EXAM CHEST 2 VIEWS: CPT | Mod: TC,PN

## 2023-05-05 PROCEDURE — 99214 OFFICE O/P EST MOD 30 MIN: CPT | Mod: ,,, | Performed by: FAMILY MEDICINE

## 2023-05-05 RX ORDER — AZITHROMYCIN 250 MG/1
TABLET, FILM COATED ORAL
Qty: 6 TABLET | Refills: 0 | Status: SHIPPED | OUTPATIENT
Start: 2023-05-05 | End: 2023-05-10

## 2023-05-05 RX ORDER — FUROSEMIDE 20 MG/1
20 TABLET ORAL 2 TIMES DAILY PRN
Qty: 180 TABLET | Refills: 1 | Status: SHIPPED | OUTPATIENT
Start: 2023-05-05 | End: 2023-05-25

## 2023-05-05 RX ORDER — PREDNISONE 20 MG/1
20 TABLET ORAL 2 TIMES DAILY
Qty: 14 TABLET | Refills: 0 | Status: SHIPPED | OUTPATIENT
Start: 2023-05-05 | End: 2023-07-10

## 2023-05-05 NOTE — PROGRESS NOTES
Raymon Tyler MD    [address] [place] [address]     PATIENT NAME: Tiny Gutierrez  : 1945  DATE: 23  MRN: [id number]      Billing Provider: Raymon Tyler MD  Level of Service: [place] OFFICE/OUTPT VISIT, EST, LEVL IV, 30-39 MIN  Patient PCP Information       Provider PCP Type    Raymon Tyler MD General            Reason for Visit / Chief Complaint: Edema, Cough (Symptoms started about a week ago. Complain of lower extremity edema 4+ bilaterally. Complain of productive cough, thick white sputum. States shortness of breath is not any worse. ), and Wheezing (States symptoms have been present for approx one week. )       Update PCP  Update Chief Complaint         History of Present Illness / Problem Focused Workflow     Tiny Gutierrez presents to the clinic with Edema, Cough (Symptoms started about a week ago. Complain of lower extremity edema 4+ bilaterally. Complain of productive cough, thick white sputum. States shortness of breath is not any worse. ), and Wheezing (States symptoms have been present for approx one week. )     Blood pressure is low, but heart rate gets too high when he does not take his metoprolol    He had surgery for Pancreatic Cancer with Dr. Juancho Yan in [place]    He has not started cancer treatment yet, Dr. Rivera at [facility], is her Oncologist     Urologist is retiring, Dr. Pinedo, we will obtain his records so we can refer him to another Urologist     Today he presents for SOB and CONRAD, with 4 plus pitting edema in both his feet. He is currently taking furosemide 20mg everyday. Also using Breo daily as prescribed.     HPI    Review of Systems     Review of Systems   Constitutional:  Negative for activity change, appetite change, chills, fatigue and fever.   HENT:  Negative for nasal congestion, ear pain, hearing loss, postnasal drip and sore throat.    Respiratory:  Positive for cough, chest  tightness, shortness of breath and wheezing.    Cardiovascular:  Positive for leg swelling. Negative for chest pain, palpitations and claudication.   Gastrointestinal:  Negative for abdominal pain, change in bowel habit, constipation, diarrhea, nausea, vomiting and change in bowel habit.   Genitourinary:  Negative for dysuria.   Musculoskeletal:  Negative for arthralgias, back pain, gait problem and myalgias.   Integumentary:  Negative for rash.   Neurological:  Negative for weakness and headaches.   Psychiatric/Behavioral:  Negative for suicidal ideas. The patient is not nervous/anxious.       Medical / Social / Family History     Past Medical History:   Diagnosis Date    Aortic heart murmur     Asthma     Cancer     Emphysema/COPD     Hyperlipidemia     Ischemic cardiomyopathy     Mitral regurgitation     Old myocardial infarction 03/2001    Tricuspid regurgitation     Vitamin B 12 deficiency     Vitamin D deficiency        Past Surgical History:   Procedure Laterality Date    CARDIAC CATHETERIZATION      CARDIAC DEFIBRILLATOR PLACEMENT      CHOLECYSTECTOMY      CORONARY ARTERY BYPASS GRAFT      TOTAL KNEE ARTHROPLASTY Bilateral        Social History    reports that he has never smoked. He has never been exposed to tobacco smoke. He has never used smokeless tobacco. He reports that he does not drink alcohol and does not use drugs.    Family History  's family history includes Emphysema in his father.    Medications and Allergies     Medications  Outpatient Medications Marked as Taking for the 5/5/23 encounter (Office Visit) with Raymon Tyler MD   Medication Sig Dispense Refill    albuterol (PROVENTIL) 2.5 mg /3 mL (0.083 %) nebulizer solution 3 ml      albuterol (PROVENTIL/VENTOLIN HFA) 90 mcg/actuation inhaler 2 puffs as needed      aspirin (ECOTRIN) 81 MG EC tablet Take 81 mg by mouth once daily.      atorvastatin (LIPITOR) 40 MG tablet Take 1 tablet (40 mg total) by mouth once daily. 90 tablet 1     BREO ELLIPTA 200-25 mcg/dose DsDv diskus inhaler 1 puff once daily.      calcium carbonate/vitamin D3 (CALTRATE 600 + D ORAL) Take 1 capsule by mouth Daily.      clopidogreL (PLAVIX) 75 mg tablet Take 1 tablet (75 mg total) by mouth once daily. 90 tablet 1    leuprolide (ELIGARD) 7.5 mg (1 month) Syrg Takes every 6 months.      melatonin (MELATIN) Take 3 mg by mouth.      metoprolol tartrate (LOPRESSOR) 25 MG tablet Take 1 tablet (25 mg total) by mouth once daily. 90 tablet 3    naproxen-diphenhydramine (ALEVE PM) 220-25 mg Tab       omeprazole (PRILOSEC) 20 MG capsule TAKE 1 CAPSULE BY MOUTH ONCE DAILY FOR STOMACH 90 capsule 1    ondansetron (ZOFRAN) 4 MG tablet Take 1 tablet (4 mg total) by mouth every 8 (eight) hours as needed for Nausea. 30 tablet 1    [DISCONTINUED] furosemide (LASIX) 20 MG tablet Take 1 tablet (20 mg total) by mouth once daily. 90 tablet 3       Allergies  Review of patient's allergies indicates:   Allergen Reactions    Pollen extracts     Hay fever and allergy relief Rash       Physical Examination     Vitals:    23 1406   BP: 98/61   Pulse: 78   Resp: 20     Physical Exam  Vitals and nursing note reviewed.   Constitutional:       General: He is not in acute distress.     Appearance: Normal appearance. He is not ill-appearing.   Eyes:      Extraocular Movements: Extraocular movements intact.      Pupils: Pupils are equal, round, and reactive to light.   Cardiovascular:      Rate and Rhythm: Normal rate and regular rhythm.      Heart sounds: Normal heart sounds.   Pulmonary:      Effort: Pulmonary effort is normal.      Breath sounds: Normal breath sounds.   Abdominal:      General: Bowel sounds are normal.      Palpations: Abdomen is soft.   Musculoskeletal:         General: Normal range of motion.      Right lower le+ Pitting Edema present.      Left lower le+ Pitting Edema present.   Skin:     Findings: No rash.   Neurological:      General: No focal deficit present.       Mental Status: He is alert and oriented to person, place, and time. Mental status is at baseline.   Psychiatric:         Mood and Affect: Mood normal.         Behavior: Behavior normal.          Assessment and Plan (including Health Maintenance)      Problem List  Smart Sets  Document Outside HM   :    Plan:         Health Maintenance Due   Topic Date Due    Hepatitis C Screening  Never done    Lipid Panel  10/27/2022       Problem List Items Addressed This Visit          Pulmonary    Chronic obstructive pulmonary disease - Primary (Chronic)    Relevant Medications    predniSONE (DELTASONE) 20 MG tablet    azithromycin (Z-LESLI) 250 MG tablet       Cardiac/Vascular    Atherosclerotic heart disease of native coronary artery without angina pectoris (Chronic)    Arteriosclerosis of coronary artery (Chronic)    Chronic systolic (congestive) heart failure (Chronic)    Relevant Medications    furosemide (LASIX) 20 MG tablet       Oncology    Malignant neoplasm of other parts of pancreas (Chronic)    Overview     Whipple procedure performed at [facility], [place], operated by [facility] in [facility] By Dr. Juancho Yan. Early 2023            Current Assessment & Plan     He is doing fairly well with all things considered. He is planning on following with Dr. Rivera for Cancer treatment             Other Visit Diagnoses       Edema, unspecified type        Relevant Orders    X-Ray Chest PA And Lateral (Completed)    Shortness of breath        Relevant Orders    X-Ray Chest PA And Lateral (Completed)            Health Maintenance Topics with due status: Not Due       Topic Last Completion Date    Colonoscopy 06/30/2014       Procedures     Future Appointments   Date Time Provider Department Center   5/17/2023 11:00 AM [facility] CV DEVICE CHECK SH[facility] MARGYA [place] [facility]   9/11/2023 12:30 PM MANGO FatimaOBC CARD [place] [facility]   10/27/2023  2:00 PM AWSHER NURSE, [facility] FAMILY MEDICINE [facility] TIMOTHY Diaz        Follow up in 3 months (on  8/5/2023), or if symptoms worsen or fail to improve, for chronic health problems.     Signature:  Raymon Tyler MD  [facility], [address] [place] [address]  Phone #: [phone]  Fax #: [phone]    Date of encounter: 5/5/23    There are no Patient Instructions on file for this visit.

## 2023-05-05 NOTE — ASSESSMENT & PLAN NOTE
He is doing fairly well with all things considered. He is planning on following with Dr. Rivera for Cancer treatment

## 2023-05-09 DIAGNOSIS — Z71.89 COMPLEX CARE COORDINATION: ICD-10-CM

## 2023-05-17 ENCOUNTER — DOCUMENT SCAN (OUTPATIENT)
Dept: HOME HEALTH SERVICES | Facility: HOSPITAL | Age: 78
End: 2023-05-17
Payer: MEDICARE

## 2023-05-23 ENCOUNTER — PATIENT OUTREACH (OUTPATIENT)
Dept: ADMINISTRATIVE | Facility: CLINIC | Age: 78
End: 2023-05-23

## 2023-05-24 ENCOUNTER — PATIENT OUTREACH (OUTPATIENT)
Dept: ADMINISTRATIVE | Facility: CLINIC | Age: 78
End: 2023-05-24

## 2023-05-25 ENCOUNTER — OFFICE VISIT (OUTPATIENT)
Dept: FAMILY MEDICINE | Facility: CLINIC | Age: 78
End: 2023-05-25
Payer: MEDICARE

## 2023-05-25 ENCOUNTER — TELEPHONE (OUTPATIENT)
Dept: FAMILY MEDICINE | Facility: CLINIC | Age: 78
End: 2023-05-25
Payer: MEDICARE

## 2023-05-25 VITALS
OXYGEN SATURATION: 100 % | WEIGHT: 204 LBS | RESPIRATION RATE: 20 BRPM | HEART RATE: 89 BPM | SYSTOLIC BLOOD PRESSURE: 91 MMHG | HEIGHT: 73 IN | DIASTOLIC BLOOD PRESSURE: 54 MMHG | BODY MASS INDEX: 27.04 KG/M2

## 2023-05-25 DIAGNOSIS — I48.4 ATYPICAL ATRIAL FLUTTER: Chronic | ICD-10-CM

## 2023-05-25 DIAGNOSIS — K55.069 MESENTERIC VEIN THROMBOSIS: Primary | Chronic | ICD-10-CM

## 2023-05-25 DIAGNOSIS — I24.0 ACUTE THROMBUS OF LEFT VENTRICLE: ICD-10-CM

## 2023-05-25 DIAGNOSIS — I51.3 LEFT VENTRICULAR THROMBUS: ICD-10-CM

## 2023-05-25 DIAGNOSIS — I25.10 CORONARY ARTERY DISEASE INVOLVING NATIVE CORONARY ARTERY OF NATIVE HEART WITHOUT ANGINA PECTORIS: Chronic | ICD-10-CM

## 2023-05-25 DIAGNOSIS — C25.7 MALIGNANT NEOPLASM OF OTHER PARTS OF PANCREAS: Chronic | ICD-10-CM

## 2023-05-25 DIAGNOSIS — R11.0 NAUSEA: ICD-10-CM

## 2023-05-25 PROCEDURE — 99214 PR OFFICE/OUTPT VISIT, EST, LEVL IV, 30-39 MIN: ICD-10-PCS | Mod: ,,, | Performed by: FAMILY MEDICINE

## 2023-05-25 PROCEDURE — 99214 OFFICE O/P EST MOD 30 MIN: CPT | Mod: ,,, | Performed by: FAMILY MEDICINE

## 2023-05-25 RX ORDER — ONDANSETRON 4 MG/1
4 TABLET, FILM COATED ORAL EVERY 8 HOURS PRN
Qty: 30 TABLET | Refills: 2 | Status: SHIPPED | OUTPATIENT
Start: 2023-05-25 | End: 2023-06-22

## 2023-05-25 NOTE — TELEPHONE ENCOUNTER
Patients wife called and stated all of his pain medication has acetaminophen in it. She states she understood it will work against the eliquis. She would like for you to send in a different type pain medicine that will not interfere with eliquis.

## 2023-05-25 NOTE — PROGRESS NOTES
Raymon Tyler MD    [address] [place] [address]     PATIENT NAME: Tiny Gutierrez  : 1945  DATE: 23  MRN: [id number]      Billing Provider: Raymon Tyler MD  Level of Service: CA OFFICE/OUTPT VISIT, EST, LEVL IV, 30-39 MIN  Patient PCP Information       Provider PCP Type    Raymon Tyler MD General            Reason for Visit / Chief Complaint: Follow-up (Here today for hospital follow up from [facility], operated by [facility]. Discharged on 2023. States he is nausea. States [facility] ambulance to [facility], operated by [facility]. He has home health with [facility], states his blood pressure has not been as low as today since being home. )       Update PCP  Update Chief Complaint         History of Present Illness / Problem Focused Workflow     Tiny Gutierrez presents to the clinic with Follow-up (Here today for hospital follow up from [facility], operated by [facility]. Discharged on 2023. States he is nausea. States he [facility] then transferred by ambulance to [facility], operated by [facility]. He has home health with [facility], states his blood pressure has not been as low as today since being home. )     He had Whipples surgery for Pancreatic cancer, since the Dx was made, he has been through a lot of tough times    Blood pressure is significantly low today    recent blood clots, he was treated at [facility] in [place].     HPI    Review of Systems     Review of Systems   Constitutional:  Negative for activity change, appetite change, chills, fatigue and fever.   HENT:  Negative for nasal congestion, ear pain, hearing loss, postnasal drip and sore throat.    Respiratory:  Negative for cough, chest tightness, shortness of breath and wheezing.    Cardiovascular:  Negative for chest pain, palpitations, leg swelling and claudication.   Gastrointestinal:  Negative for abdominal pain, change in bowel habit, constipation,  diarrhea, nausea, vomiting and change in bowel habit.   Genitourinary:  Negative for dysuria.   Musculoskeletal:  Negative for arthralgias, back pain, gait problem and myalgias.   Integumentary:  Negative for rash.   Neurological:  Negative for weakness and headaches.   Psychiatric/Behavioral:  Negative for suicidal ideas. The patient is not nervous/anxious.       Medical / Social / Family History     Past Medical History:   Diagnosis Date    Aortic heart murmur     Asthma     Cancer     Deep vein thrombosis     Emphysema/COPD     Hyperlipidemia     Ischemic cardiomyopathy     Mitral regurgitation     Old myocardial infarction 03/2001    Tricuspid regurgitation     Vitamin B 12 deficiency     Vitamin D deficiency        Past Surgical History:   Procedure Laterality Date    CARDIAC CATHETERIZATION      CARDIAC DEFIBRILLATOR PLACEMENT      CHOLECYSTECTOMY      CORONARY ARTERY BYPASS GRAFT      TOTAL KNEE ARTHROPLASTY Bilateral        Social History    reports that he has never smoked. He has never been exposed to tobacco smoke. He has never used smokeless tobacco. He reports that he does not drink alcohol and does not use drugs.    Family History  's family history includes Emphysema in his father.    Medications and Allergies     Medications  Outpatient Medications Marked as Taking for the 5/25/23 encounter (Office Visit) with Raymon Tyler MD   Medication Sig Dispense Refill    albuterol (PROVENTIL) 2.5 mg /3 mL (0.083 %) nebulizer solution 3 ml      albuterol (PROVENTIL/VENTOLIN HFA) 90 mcg/actuation inhaler 2 puffs as needed      apixaban (ELIQUIS) 2.5 mg Tab Take 2.5 mg by mouth.      atorvastatin (LIPITOR) 40 MG tablet Take 1 tablet (40 mg total) by mouth once daily. 90 tablet 1    BREO ELLIPTA 200-25 mcg/dose DsDv diskus inhaler 1 puff once daily.      calcium carbonate/vitamin D3 (CALTRATE 600 + D ORAL) Take 1 capsule by mouth Daily.      leuprolide (ELIGARD) 7.5 mg (1 month) Syrg Takes every 6  months.      melatonin (MELATIN) Take 3 mg by mouth.      omega 3-dha-epa-fish oil 1,000 mg (120 mg-180 mg) Cap 1 capsule      omeprazole (PRILOSEC) 20 MG capsule TAKE 1 CAPSULE BY MOUTH ONCE DAILY FOR STOMACH 90 capsule 1    [DISCONTINUED] ondansetron (ZOFRAN) 4 MG tablet Take 1 tablet (4 mg total) by mouth every 8 (eight) hours as needed for Nausea. 30 tablet 1       Allergies  Review of patient's allergies indicates:   Allergen Reactions    Pollen extracts     Hay fever and allergy relief Rash       Physical Examination     Vitals:    05/25/23 0924   BP: (!) 91/54   Pulse: 89   Resp: 20     Physical Exam  Vitals and nursing note reviewed.   Constitutional:       General: He is not in acute distress.     Appearance: Normal appearance. He is ill-appearing.   Eyes:      Extraocular Movements: Extraocular movements intact.      Pupils: Pupils are equal, round, and reactive to light.   Cardiovascular:      Rate and Rhythm: Normal rate and regular rhythm.      Heart sounds: Normal heart sounds.   Pulmonary:      Effort: Pulmonary effort is normal.      Breath sounds: Normal breath sounds.   Abdominal:      General: Bowel sounds are normal.      Palpations: Abdomen is soft.   Musculoskeletal:         General: Normal range of motion.   Skin:     Findings: No rash.   Neurological:      General: No focal deficit present.      Mental Status: He is alert and oriented to person, place, and time. Mental status is at baseline.   Psychiatric:         Mood and Affect: Mood normal.         Behavior: Behavior normal.          Assessment and Plan (including Health Maintenance)      Problem List  Smart Sets  Document Outside HM   :    Plan:         Health Maintenance Due   Topic Date Due    Hepatitis C Screening  Never done    Lipid Panel  10/27/2022       Problem List Items Addressed This Visit          Cardiac/Vascular    Coronary artery disease involving native heart without angina pectoris (Chronic)    Atrial flutter (Chronic)     Left ventricular thrombus    Current Assessment & Plan     Continue taking Eliquis, follow up with Cardiology as instructed             Oncology    Malignant neoplasm of other parts of pancreas (Chronic)    Overview     Whipple procedure performed at [facility] in [place], [place]. By Dr. Juancho Yan. Early 2023          Current Assessment & Plan     This appears to have set off a chain of events, and led to excessive fatigue, pain, and nausea. Overall he does not feel well at this time. He has not started chemotherapy either.                 GI    Mesenteric vein thrombosis - Primary    Current Assessment & Plan     He will likely remain on blood thinners for the foreseeable future, Eliquis           Other Visit Diagnoses       Nausea        Relevant Medications    ondansetron (ZOFRAN) 4 MG tablet    Acute thrombus of left ventricle                Health Maintenance Topics with due status: Not Due       Topic Last Completion Date    Colonoscopy 06/30/2014       Procedures     Future Appointments   Date Time Provider Department Center   9/11/2023 12:30 PM MANGO Fatima OBC [facility] [place] [facility]   10/27/2023  2:00 PM AWV NURSE, [facility] [address] [facility] TIMOTHY Diaz        Follow up if symptoms worsen or fail to improve.     Signature:  Raymon Tyler MD  [address] [place] [address]  Phone #: [phone]  Fax #: [phone]    Date of encounter: 5/25/23    There are no Patient Instructions on file for this visit.

## 2023-05-29 NOTE — ASSESSMENT & PLAN NOTE
This appears to have set off a chain of events, and led to excessive fatigue, pain, and nausea. Overall he does not feel well at this time. He has not started chemotherapy either.

## 2023-06-07 ENCOUNTER — HOSPITAL ENCOUNTER (OUTPATIENT)
Dept: CARDIOLOGY | Facility: HOSPITAL | Age: 78
Discharge: HOME | End: 2023-06-07
Attending: INTERNAL MEDICINE
Payer: MEDICARE

## 2023-06-07 DIAGNOSIS — Z95.810 PRESENCE OF CARDIAC DEFIBRILLATOR: Primary | ICD-10-CM

## 2023-06-07 DIAGNOSIS — Z95.810 PRESENCE OF CARDIAC DEFIBRILLATOR: ICD-10-CM

## 2023-06-07 PROCEDURE — 93296 REM INTERROG EVL PM/IDS: CPT

## 2023-06-07 PROCEDURE — 93295 CARDIAC DEVICE CHECK - REMOTE: ICD-10-PCS | Mod: ,,, | Performed by: INTERNAL MEDICINE

## 2023-06-07 PROCEDURE — 93295 DEV INTERROG REMOTE 1/2/MLT: CPT | Mod: ,,, | Performed by: INTERNAL MEDICINE

## 2023-06-08 LAB
BATTERY VOLTAGE (V): 2.98 V
ERI (V): 2.73 V
OHS CV DC PP MS1: 0.4 MS
OHS CV DC PP MS2: 0.4 MS
OHS CV DC PP V1: 1.5 V
OHS CV DC PP V2: 2.25 V

## 2023-06-22 RX ORDER — PROCHLORPERAZINE MALEATE 10 MG
10 TABLET ORAL EVERY 6 HOURS PRN
Qty: 60 TABLET | Refills: 5 | Status: SHIPPED | OUTPATIENT
Start: 2023-06-22 | End: 2023-09-27

## 2023-06-22 NOTE — TELEPHONE ENCOUNTER
Lorie maldonado/nayely jeong called requesting something different other than zofran and phenergan for nausea. States neither are controlling symptoms.  He has taken compazine in the past. Could we refill this?

## 2023-07-10 ENCOUNTER — OFFICE VISIT (OUTPATIENT)
Dept: FAMILY MEDICINE | Facility: CLINIC | Age: 78
End: 2023-07-10
Payer: MEDICARE

## 2023-07-10 VITALS
RESPIRATION RATE: 20 BRPM | DIASTOLIC BLOOD PRESSURE: 64 MMHG | WEIGHT: 224 LBS | OXYGEN SATURATION: 95 % | HEIGHT: 73 IN | HEART RATE: 97 BPM | BODY MASS INDEX: 29.69 KG/M2 | SYSTOLIC BLOOD PRESSURE: 107 MMHG

## 2023-07-10 DIAGNOSIS — D84.821 DRUG-INDUCED IMMUNODEFICIENCY: ICD-10-CM

## 2023-07-10 DIAGNOSIS — R60.0 BILATERAL LOWER EXTREMITY EDEMA: ICD-10-CM

## 2023-07-10 DIAGNOSIS — Z79.899 DRUG-INDUCED IMMUNODEFICIENCY: ICD-10-CM

## 2023-07-10 DIAGNOSIS — I25.5 ISCHEMIC CARDIOMYOPATHY: Chronic | ICD-10-CM

## 2023-07-10 DIAGNOSIS — I10 HYPERTENSION, ESSENTIAL: Primary | Chronic | ICD-10-CM

## 2023-07-10 LAB
ALBUMIN SERPL BCP-MCNC: 2.1 G/DL (ref 3.5–5)
ALBUMIN/GLOB SERPL: 0.6 {RATIO}
ALP SERPL-CCNC: 123 U/L (ref 45–115)
ALT SERPL W P-5'-P-CCNC: 25 U/L (ref 16–61)
ANION GAP SERPL CALCULATED.3IONS-SCNC: 10 MMOL/L (ref 7–16)
AST SERPL W P-5'-P-CCNC: 44 U/L (ref 15–37)
BASOPHILS # BLD AUTO: 0.02 K/UL (ref 0–0.2)
BASOPHILS NFR BLD AUTO: 0.4 % (ref 0–1)
BILIRUB SERPL-MCNC: 0.6 MG/DL (ref ?–1.2)
BUN SERPL-MCNC: 7 MG/DL (ref 7–18)
BUN/CREAT SERPL: 7 (ref 6–20)
CALCIUM SERPL-MCNC: 8.1 MG/DL (ref 8.5–10.1)
CHLORIDE SERPL-SCNC: 104 MMOL/L (ref 98–107)
CO2 SERPL-SCNC: 28 MMOL/L (ref 21–32)
CREAT SERPL-MCNC: 0.97 MG/DL (ref 0.7–1.3)
DIFFERENTIAL METHOD BLD: ABNORMAL
EGFR (NO RACE VARIABLE) (RUSH/TITUS): 79 ML/MIN/1.73M2
EOSINOPHIL # BLD AUTO: 0.12 K/UL (ref 0–0.5)
EOSINOPHIL NFR BLD AUTO: 2.4 % (ref 1–4)
ERYTHROCYTE [DISTWIDTH] IN BLOOD BY AUTOMATED COUNT: 16.8 % (ref 11.5–14.5)
GLOBULIN SER-MCNC: 3.3 G/DL (ref 2–4)
GLUCOSE SERPL-MCNC: 99 MG/DL (ref 74–106)
HCT VFR BLD AUTO: 33.7 % (ref 40–54)
HGB BLD-MCNC: 11.1 G/DL (ref 13.5–18)
IMM GRANULOCYTES # BLD AUTO: 0 K/UL (ref 0–0.04)
IMM GRANULOCYTES NFR BLD: 0 % (ref 0–0.4)
LYMPHOCYTES # BLD AUTO: 1.6 K/UL (ref 1–4.8)
LYMPHOCYTES NFR BLD AUTO: 31.4 % (ref 27–41)
MCH RBC QN AUTO: 31.6 PG (ref 27–31)
MCHC RBC AUTO-ENTMCNC: 32.9 G/DL (ref 32–36)
MCV RBC AUTO: 96 FL (ref 80–96)
MONOCYTES # BLD AUTO: 0.51 K/UL (ref 0–0.8)
MONOCYTES NFR BLD AUTO: 10 % (ref 2–6)
MPC BLD CALC-MCNC: 10.2 FL (ref 9.4–12.4)
NEUTROPHILS # BLD AUTO: 2.84 K/UL (ref 1.8–7.7)
NEUTROPHILS NFR BLD AUTO: 55.8 % (ref 53–65)
NRBC # BLD AUTO: 0 X10E3/UL
NRBC, AUTO (.00): 0 %
NT-PROBNP SERPL-MCNC: 3204 PG/ML (ref 1–450)
PLATELET # BLD AUTO: 148 K/UL (ref 150–400)
POTASSIUM SERPL-SCNC: 3.3 MMOL/L (ref 3.5–5.1)
PROT SERPL-MCNC: 5.4 G/DL (ref 6.4–8.2)
RBC # BLD AUTO: 3.51 M/UL (ref 4.6–6.2)
SODIUM SERPL-SCNC: 139 MMOL/L (ref 136–145)
WBC # BLD AUTO: 5.09 K/UL (ref 4.5–11)

## 2023-07-10 PROCEDURE — 96372 PR INJECTION,THERAP/PROPH/DIAG2ST, IM OR SUBCUT: ICD-10-PCS | Mod: ,,, | Performed by: FAMILY MEDICINE

## 2023-07-10 PROCEDURE — 80053 COMPREHEN METABOLIC PANEL: CPT | Mod: ,,, | Performed by: CLINICAL MEDICAL LABORATORY

## 2023-07-10 PROCEDURE — 83880 NT-PRO NATRIURETIC PEPTIDE: ICD-10-PCS | Mod: ,,, | Performed by: CLINICAL MEDICAL LABORATORY

## 2023-07-10 PROCEDURE — 96372 THER/PROPH/DIAG INJ SC/IM: CPT | Mod: ,,, | Performed by: FAMILY MEDICINE

## 2023-07-10 PROCEDURE — 85025 CBC WITH DIFFERENTIAL: ICD-10-PCS | Mod: ,,, | Performed by: CLINICAL MEDICAL LABORATORY

## 2023-07-10 PROCEDURE — 80053 COMPREHENSIVE METABOLIC PANEL: ICD-10-PCS | Mod: ,,, | Performed by: CLINICAL MEDICAL LABORATORY

## 2023-07-10 PROCEDURE — 99214 OFFICE O/P EST MOD 30 MIN: CPT | Mod: ,,, | Performed by: FAMILY MEDICINE

## 2023-07-10 PROCEDURE — 83880 ASSAY OF NATRIURETIC PEPTIDE: CPT | Mod: ,,, | Performed by: CLINICAL MEDICAL LABORATORY

## 2023-07-10 PROCEDURE — 85025 COMPLETE CBC W/AUTO DIFF WBC: CPT | Mod: ,,, | Performed by: CLINICAL MEDICAL LABORATORY

## 2023-07-10 PROCEDURE — 99214 PR OFFICE/OUTPT VISIT, EST, LEVL IV, 30-39 MIN: ICD-10-PCS | Mod: ,,, | Performed by: FAMILY MEDICINE

## 2023-07-10 RX ORDER — POTASSIUM CHLORIDE 20 MEQ/1
20 TABLET, EXTENDED RELEASE ORAL 2 TIMES DAILY PRN
Qty: 60 TABLET | Refills: 2 | Status: SHIPPED | OUTPATIENT
Start: 2023-07-10 | End: 2023-09-27

## 2023-07-10 RX ORDER — ONDANSETRON 4 MG/1
4 TABLET, ORALLY DISINTEGRATING ORAL EVERY 8 HOURS PRN
COMMUNITY
Start: 2023-05-01 | End: 2024-03-18 | Stop reason: SDUPTHER

## 2023-07-10 RX ORDER — CAPECITABINE 500 MG/1
500 TABLET, FILM COATED ORAL 2 TIMES DAILY
COMMUNITY
End: 2024-03-18

## 2023-07-10 RX ORDER — FUROSEMIDE 10 MG/ML
40 INJECTION INTRAMUSCULAR; INTRAVENOUS
Status: COMPLETED | OUTPATIENT
Start: 2023-07-10 | End: 2023-07-10

## 2023-07-10 RX ORDER — FUROSEMIDE 20 MG/1
40 TABLET ORAL DAILY
COMMUNITY
End: 2023-08-02 | Stop reason: DRUGHIGH

## 2023-07-10 RX ORDER — PANCRELIPASE 60000; 12000; 38000 [USP'U]/1; [USP'U]/1; [USP'U]/1
1 CAPSULE, DELAYED RELEASE PELLETS ORAL
COMMUNITY
Start: 2023-06-02 | End: 2024-03-18

## 2023-07-10 RX ADMIN — FUROSEMIDE 40 MG: 10 INJECTION INTRAMUSCULAR; INTRAVENOUS at 03:07

## 2023-07-10 NOTE — PROGRESS NOTES
Raymon Tyler MD    [address] [place] [address]     PATIENT NAME: Tiny Gutierrez  : 1945  DATE: 7/10/23  MRN: [id number]      Billing Provider: Rayomn Tyler MD  Level of Service: IN OFFICE/OUTPT VISIT, EST, LEVL IV, 30-39 MIN  Patient PCP Information       Provider PCP Type    Raymon Tyler MD General            Reason for Visit / Chief Complaint: Edema (Symptoms started two weeks ago. Complain of 4+ edema to bilateral lower extremities. States he has been elevating his legs but no results. Wife feels like his abdomen is swollen compared to normal. )       Update PCP  Update Chief Complaint         History of Present Illness / Problem Focused Workflow     Tiny Gutierrez presents to the clinic with Edema (Symptoms started two weeks ago. Complain of 4+ edema to bilateral lower extremities. States he has been elevating his legs but no results. Wife feels like his abdomen is swollen compared to normal. )     His legs are very swollen, he started cancer medication 1 week ago and since then his legs have become very large. Left leg the swelling goes up to his knee.     Dr. Latricia Rivera, Oncology     HPI    Review of Systems     Review of Systems   Constitutional:  Negative for activity change, appetite change, chills, fatigue and fever.   HENT:  Negative for nasal congestion, ear pain, hearing loss, postnasal drip and sore throat.    Respiratory:  Negative for cough, chest tightness, shortness of breath and wheezing.    Cardiovascular:  Positive for leg swelling. Negative for chest pain, palpitations and claudication.   Gastrointestinal:  Negative for abdominal pain, change in bowel habit, constipation, diarrhea, nausea, vomiting and change in bowel habit.   Genitourinary:  Negative for dysuria.   Musculoskeletal:  Negative for arthralgias, back pain, gait problem and myalgias.   Integumentary:  Negative for rash.   Neurological:  Negative  for weakness and headaches.   Psychiatric/Behavioral:  Negative for suicidal ideas. The patient is not nervous/anxious.       Medical / Social / Family History     Past Medical History:   Diagnosis Date    Aortic heart murmur     Asthma     Cancer     Deep vein thrombosis     Emphysema/COPD     Hyperlipidemia     Ischemic cardiomyopathy     Mitral regurgitation     Old myocardial infarction 03/2001    Tricuspid regurgitation     Vitamin B 12 deficiency     Vitamin D deficiency        Past Surgical History:   Procedure Laterality Date    CARDIAC CATHETERIZATION      CARDIAC DEFIBRILLATOR PLACEMENT      CHOLECYSTECTOMY      CORONARY ARTERY BYPASS GRAFT      TOTAL KNEE ARTHROPLASTY Bilateral        Social History    reports that he has never smoked. He has never been exposed to tobacco smoke. He has never used smokeless tobacco. He reports that he does not drink alcohol and does not use drugs.    Family History  's family history includes Emphysema in his father.    Medications and Allergies     Medications  Outpatient Medications Marked as Taking for the 7/10/23 encounter (Office Visit) with Raymon Tyler MD   Medication Sig Dispense Refill    albuterol (PROVENTIL) 2.5 mg /3 mL (0.083 %) nebulizer solution 3 ml      albuterol (PROVENTIL/VENTOLIN HFA) 90 mcg/actuation inhaler 2 puffs as needed      atorvastatin (LIPITOR) 40 MG tablet Take 1 tablet (40 mg total) by mouth once daily. 90 tablet 1    BREO ELLIPTA 200-25 mcg/dose DsDv diskus inhaler 1 puff once daily.      calcium carbonate/vitamin D3 (CALTRATE 600 + D ORAL) Take 1 capsule by mouth Daily.      capecitabine (XELODA) 500 MG Tab Take 500 mg by mouth 2 (two) times daily Take 3 tablets twice daily after meals.      furosemide (LASIX) 20 MG tablet Take 20 mg by mouth once daily. Take one tablet daily      HYDROcodone-acetaminophen (NORCO) 5-325 mg per tablet Take 1 tablet by mouth every 6 (six) hours as needed.      melatonin (MELATIN) Take 3 mg by  mouth.      omega 3-dha-epa-fish oil 1,000 mg (120 mg-180 mg) Cap 1 capsule      ondansetron (ZOFRAN-ODT) 4 MG TbDL Take 4 mg by mouth every 8 (eight) hours as needed.      prochlorperazine (COMPAZINE) 10 MG tablet Take 1 tablet (10 mg total) by mouth every 6 (six) hours as needed (nausea). Take 10 mg by mouth every 6 (six) hours as needed for NAUSEA 60 tablet 5     Current Facility-Administered Medications for the 7/10/23 encounter (Office Visit) with Raymon Tyler MD   Medication Dose Route Frequency Provider Last Rate Last Admin    [COMPLETED] furosemide injection 40 mg  40 mg Intramuscular 1 time in Clinic/HOD Raymon Tyler MD   40 mg at 07/10/23 1549       Allergies  Review of patient's allergies indicates:   Allergen Reactions    Pollen extracts     Hay fever and allergy relief Rash       Physical Examination     Vitals:    07/10/23 1458   BP: 107/64   Pulse: 97   Resp: 20     Physical Exam  Vitals and nursing note reviewed.   Constitutional:       General: He is not in acute distress.     Appearance: Normal appearance. He is not ill-appearing.   Eyes:      Extraocular Movements: Extraocular movements intact.      Pupils: Pupils are equal, round, and reactive to light.   Cardiovascular:      Rate and Rhythm: Normal rate and regular rhythm.      Heart sounds: Normal heart sounds.      Comments: His legs are very large and spongy on exam   Pulmonary:      Effort: Pulmonary effort is normal.      Breath sounds: Normal breath sounds.   Abdominal:      General: Bowel sounds are normal.      Palpations: Abdomen is soft.   Musculoskeletal:         General: Normal range of motion.      Right lower le+ Pitting Edema present.      Left lower le+ Pitting Edema present.   Skin:     Findings: No rash.   Neurological:      General: No focal deficit present.      Mental Status: He is alert and oriented to person, place, and time. Mental status is at baseline.   Psychiatric:         Mood and Affect: Mood  normal.         Behavior: Behavior normal.          Assessment and Plan (including Health Maintenance)      Problem List  Smart Sets  Document Outside HM   :    Plan:     IM lasix 40mg, potassium po. Consider holding cancer treatment. Labs pending       Health Maintenance Due   Topic Date Due    Shingles Vaccine (1 of 2) Never done    TETANUS VACCINE  09/17/2012    Lipid Panel  10/27/2022       Problem List Items Addressed This Visit          Cardiac/Vascular    Hypertension, essential - Primary (Chronic)    Relevant Orders    NT-Pro Natriuretic Peptide    Comprehensive Metabolic Panel    CBC Auto Differential    Ischemic cardiomyopathy (Chronic)    Relevant Medications    furosemide injection 40 mg (Completed)    potassium chloride SA (K-DUR,KLOR-CON) 20 MEQ tablet    Other Relevant Orders    NT-Pro Natriuretic Peptide    Comprehensive Metabolic Panel    CBC Auto Differential       Immunology/Multi System    Drug-induced immunodeficiency       Other    Bilateral lower extremity edema    Current Assessment & Plan     Suspect it is a side effect from his cancer treatment. I spoke with Dr. Rivera and she stated she would contact the patient to discuss stopping the medication.             Health Maintenance Topics with due status: Not Due       Topic Last Completion Date    Colonoscopy 06/30/2014    Influenza Vaccine 09/12/2022       Procedures     Future Appointments   Date Time Provider Department Center   [facility]11[facility]2023 12[facility] [facility]   10/27/2023  2:00 PM AWV NURSE, [facility]IC[facility] TIMOTHY Diaz        No follow-ups on file.     Signature:  Raymon Tyler MD  [address] [place] [address]  Phone #: [phone]  Fax #: [phone]    Date of encounter: 7/10/23    There are no Patient Instructions on file for this visit.

## 2023-07-10 NOTE — ASSESSMENT & PLAN NOTE
Suspect it is a side effect from his cancer treatment. I spoke with Dr. Rivera and she stated she would contact the patient to discuss stopping the medication.

## 2023-07-21 DIAGNOSIS — I25.10 CORONARY ARTERY DISEASE INVOLVING NATIVE CORONARY ARTERY OF NATIVE HEART WITHOUT ANGINA PECTORIS: Chronic | ICD-10-CM

## 2023-07-21 DIAGNOSIS — I25.5 ISCHEMIC CARDIOMYOPATHY: Primary | Chronic | ICD-10-CM

## 2023-08-02 ENCOUNTER — OFFICE VISIT (OUTPATIENT)
Dept: CARDIOLOGY | Facility: CLINIC | Age: 78
End: 2023-08-02
Payer: MEDICARE

## 2023-08-02 ENCOUNTER — HOSPITAL ENCOUNTER (OUTPATIENT)
Dept: CARDIOLOGY | Facility: HOSPITAL | Age: 78
Discharge: HOME | End: 2023-08-02
Attending: INTERNAL MEDICINE
Payer: MEDICARE

## 2023-08-02 ENCOUNTER — HOSPITAL ENCOUNTER (EMERGENCY)
Facility: HOSPITAL | Age: 78
Discharge: HOME | End: 2023-08-02
Payer: MEDICARE

## 2023-08-02 VITALS
HEIGHT: 73 IN | BODY MASS INDEX: 28.07 KG/M2 | HEART RATE: 97 BPM | DIASTOLIC BLOOD PRESSURE: 60 MMHG | SYSTOLIC BLOOD PRESSURE: 104 MMHG | WEIGHT: 211.81 LBS | WEIGHT: 224 LBS | HEIGHT: 73 IN | OXYGEN SATURATION: 96 % | BODY MASS INDEX: 29.69 KG/M2

## 2023-08-02 VITALS
BODY MASS INDEX: 27.96 KG/M2 | OXYGEN SATURATION: 100 % | TEMPERATURE: 99 F | SYSTOLIC BLOOD PRESSURE: 135 MMHG | DIASTOLIC BLOOD PRESSURE: 62 MMHG | HEIGHT: 73 IN | RESPIRATION RATE: 17 BRPM | HEART RATE: 79 BPM | WEIGHT: 211 LBS

## 2023-08-02 DIAGNOSIS — I50.22 CHRONIC SYSTOLIC (CONGESTIVE) HEART FAILURE: Chronic | ICD-10-CM

## 2023-08-02 DIAGNOSIS — I25.5 ISCHEMIC CARDIOMYOPATHY: Chronic | ICD-10-CM

## 2023-08-02 DIAGNOSIS — I10 HYPERTENSION, ESSENTIAL: Chronic | ICD-10-CM

## 2023-08-02 DIAGNOSIS — I25.10 ATHEROSCLEROSIS OF NATIVE CORONARY ARTERY OF NATIVE HEART WITHOUT ANGINA PECTORIS: Chronic | ICD-10-CM

## 2023-08-02 DIAGNOSIS — Z79.899 HIGH RISK MEDICATION USE: ICD-10-CM

## 2023-08-02 DIAGNOSIS — R60.0 BILATERAL LOWER EXTREMITY EDEMA: ICD-10-CM

## 2023-08-02 DIAGNOSIS — I25.10 CORONARY ARTERY DISEASE INVOLVING NATIVE CORONARY ARTERY OF NATIVE HEART WITHOUT ANGINA PECTORIS: Chronic | ICD-10-CM

## 2023-08-02 DIAGNOSIS — I25.5 ISCHEMIC CARDIOMYOPATHY: Primary | ICD-10-CM

## 2023-08-02 DIAGNOSIS — I48.4 ATYPICAL ATRIAL FLUTTER: Chronic | ICD-10-CM

## 2023-08-02 DIAGNOSIS — I51.3 LEFT VENTRICULAR THROMBUS: ICD-10-CM

## 2023-08-02 DIAGNOSIS — R53.83 FATIGUE, UNSPECIFIED TYPE: Primary | ICD-10-CM

## 2023-08-02 PROCEDURE — 99282 EMERGENCY DEPT VISIT SF MDM: CPT | Mod: 25

## 2023-08-02 PROCEDURE — 99283 PR EMERGENCY DEPT VISIT,LEVEL III: ICD-10-PCS | Mod: ,,, | Performed by: NURSE PRACTITIONER

## 2023-08-02 PROCEDURE — 93306 TTE W/DOPPLER COMPLETE: CPT

## 2023-08-02 PROCEDURE — 99215 OFFICE O/P EST HI 40 MIN: CPT | Mod: PBBFAC,25 | Performed by: NURSE PRACTITIONER

## 2023-08-02 PROCEDURE — 99283 EMERGENCY DEPT VISIT LOW MDM: CPT | Mod: ,,, | Performed by: NURSE PRACTITIONER

## 2023-08-02 PROCEDURE — 93306 TTE W/DOPPLER COMPLETE: CPT | Mod: 26,,, | Performed by: INTERNAL MEDICINE

## 2023-08-02 PROCEDURE — 99214 OFFICE O/P EST MOD 30 MIN: CPT | Mod: S$PBB,,, | Performed by: NURSE PRACTITIONER

## 2023-08-02 PROCEDURE — 93306 ECHO (CUPID ONLY): ICD-10-PCS | Mod: 26,,, | Performed by: INTERNAL MEDICINE

## 2023-08-02 PROCEDURE — 99214 PR OFFICE/OUTPT VISIT, EST, LEVL IV, 30-39 MIN: ICD-10-PCS | Mod: S$PBB,,, | Performed by: NURSE PRACTITIONER

## 2023-08-02 RX ORDER — FUROSEMIDE 40 MG/1
40 TABLET ORAL 2 TIMES DAILY
Qty: 60 TABLET | Refills: 11 | Status: SHIPPED | OUTPATIENT
Start: 2023-08-02 | End: 2023-10-03

## 2023-08-02 RX ORDER — SPIRONOLACTONE 50 MG/1
50 TABLET, FILM COATED ORAL DAILY
COMMUNITY

## 2023-08-02 NOTE — ASSESSMENT & PLAN NOTE
[facility] 3/20/2015 - Dr. Cardenas  1. Three Vessel CAD wit patent graft to the LAD and OM  2. Moderate LV systolic dysfunction, LVEF 35-40%  3. Yuki LV hemodynamics    No chest pain   Continue Lipitor

## 2023-08-02 NOTE — PROGRESS NOTES
"PCP: Raymon Tyler MD    Referring Provider:     Subjective:   Tiny Gutierrez is a 78 y.o. male with hx of CAD s/p CABG (2007), iCMP s/p ICD, HTN, HLD, asthma, CKD, COPD, prostate cancer, pancreatic cancer, LV thrombus ( 7/18/2021 [facility] [place] [place]) and atrial flutter,  who presents for return check requested for severe BLE edema. He denies chest pain , orthopna or PND but has chronic, stable CONRAD. The lower ext edema began approx 3 weeks ago after starting a "cancer med". This med has been stopped by his oncologist and lasix dose increased to 40 mg with the addition of aldactone 50 mg po daily with improvement but not resolution of the edema. His serum albumin is also 2.1 which is a contributing factor. An echocardiogram was done earlier today and results are pending.         Fhx:  Family History   Problem Relation Age of Onset    Emphysema Father      Shx:   Social History     Socioeconomic History    Marital status:    Tobacco Use    Smoking status: Never     Passive exposure: Never    Smokeless tobacco: Never   Substance and Sexual Activity    Alcohol use: Never    Drug use: Never    Sexual activity: Yes     Partners: Female       EKG   Results for orders placed or performed in visit on 02/16/23   EKG 12-lead    Collection Time: 02/16/23  1:27 PM    Narrative    Test Reason : I25.10,    Vent. Rate : 067 BPM     Atrial Rate : 067 BPM     P-R Int : 150 ms          QRS Dur : 104 ms      QT Int : 414 ms       P-R-T Axes : 049 078 023 degrees     QTc Int : 437 ms    Normal sinus rhythm  Possible Inferior infarct ,age undetermined  Anterolateral infarct ,age undetermined  Abnormal ECG  When compared with ECG of 13-DEC-2022 12:52,  PREVIOUS ECG IS PRESENT  Confirmed by James Hughes MD ([id number]) on 2/17/2023 3:39:30 PM    Referred By:             Confirmed By:James Hughes MD        ECHO Results for orders placed during the hospital encounter of 02/17/23    Echo    Interpretation Summary  · " Mild right ventricular enlargement with normal right ventricular systolic function.  · Moderate mitral regurgitation.  · Mild to moderate tricuspid regurgitation.  · Normal central venous pressure (3 mmHg).  · The estimated PA systolic pressure is 28 mmHg.  · Left ventricular diastolic dysfunction.  · Mildly decreased systolic function.  · The estimated ejection fraction is 40%.  · There are segmental left ventricular wall motion abnormalities.  · Mild left atrial enlargement.    LHC 3/20/15--3 V CAD with patent grafts to the LAD and obtuse marginal.  Moderate LV systolic dysfunction, LVEF 35-40%.  Normal LV hemodynamics.      3/28/07--3 V CAD.  Segmental wall motion abnormality with severely reduced LVSF, EF 20%.      CABG:     3/29/07--LIMA to LAD, SVG to RCA and OM.     ICD:     10/3/19--R/R end of life ICD with new MRI compatible ICD.      8/8/13--R/ R end of life ICD with new dual chamber ICD.     1/15/09--implantation of dual chamber PPM ICD.  .        Lab Results   Component Value Date     07/10/2023    K 3.3 (L) 07/10/2023     07/10/2023    CO2 28 07/10/2023    BUN 7 07/10/2023    CREATININE 0.97 07/10/2023    CALCIUM 8.1 (L) 07/10/2023    ANIONGAP 10 07/10/2023    EGFRNONAA 67 10/27/2021       Lab Results   Component Value Date    CHOL 185 10/27/2021    CHOL 119 08/18/2020     Lab Results   Component Value Date    HDL 54 10/27/2021    HDL 44 08/18/2020     Lab Results   Component Value Date    LDLCALC 112 10/27/2021    LDLCALC 57 08/18/2020     Lab Results   Component Value Date    TRIG 97 10/27/2021    TRIG 90 08/18/2020     Lab Results   Component Value Date    CHOLHDL 3.4 10/27/2021    CHOLHDL 2.7 08/18/2020       Lab Results   Component Value Date    WBC 5.09 07/10/2023    HGB 11.1 (L) 07/10/2023    HCT 33.7 (L) 07/10/2023    MCV 96.0 07/10/2023     (L) 07/10/2023           Current Outpatient Medications:     albuterol (PROVENTIL) 2.5 mg /3 mL (0.083 %) nebulizer solution, 3 ml, Disp:  , Rfl:     albuterol (PROVENTIL/VENTOLIN HFA) 90 mcg/actuation inhaler, 2 puffs as needed, Disp: , Rfl:     apixaban (ELIQUIS) 2.5 mg Tab, Take 2.5 mg by mouth 2 (two) times daily., Disp: , Rfl:     atorvastatin (LIPITOR) 40 MG tablet, Take 1 tablet (40 mg total) by mouth once daily., Disp: 90 tablet, Rfl: 1    BREO ELLIPTA 200-25 mcg/dose DsDv diskus inhaler, 1 puff once daily., Disp: , Rfl:     calcium carbonate/vitamin D3 (CALTRATE 600 + D ORAL), Take 1 capsule by mouth Daily., Disp: , Rfl:     lipase-protease-amylase 12,000-38,000-60,000 units (CREON) CpDR, Take by mouth., Disp: , Rfl:     omega 3-dha-epa-fish oil 1,000 mg (120 mg-180 mg) Cap, 1 capsule, Disp: , Rfl:     ondansetron (ZOFRAN-ODT) 4 MG TbDL, Take 4 mg by mouth every 8 (eight) hours as needed., Disp: , Rfl:     potassium chloride SA (K-DUR,KLOR-CON) 20 MEQ tablet, Take 1 tablet (20 mEq total) by mouth 2 (two) times daily as needed (with LASIX)., Disp: 60 tablet, Rfl: 2    capecitabine (XELODA) 500 MG Tab, Take 500 mg by mouth 2 (two) times daily Take 3 tablets twice daily after meals., Disp: , Rfl:     EScitalopram oxalate (LEXAPRO) 10 MG tablet, Take 10 mg by mouth., Disp: , Rfl:     furosemide (LASIX) 40 MG tablet, Take 1 tablet (40 mg total) by mouth 2 (two) times daily., Disp: 60 tablet, Rfl: 11    HYDROcodone-acetaminophen (NORCO) 5-325 mg per tablet, Take 1 tablet by mouth every 6 (six) hours as needed., Disp: , Rfl:     melatonin (MELATIN), Take 3 mg by mouth., Disp: , Rfl:     mometasone (NASONEX) 50 mcg/actuation nasal spray, 2 sprays in each nostril, Disp: , Rfl:     prochlorperazine (COMPAZINE) 10 MG tablet, Take 1 tablet (10 mg total) by mouth every 6 (six) hours as needed (nausea). Take 10 mg by mouth every 6 (six) hours as needed for NAUSEA (Patient not taking: Reported on 8/2/2023), Disp: 60 tablet, Rfl: 5    spironolactone (ALDACTONE) 50 MG tablet, Take 50 mg by mouth Daily., Disp: , Rfl:     triamcinolone acetonide (NASACORT AQ  "NASL), , Disp: , Rfl:   Meds reviewed and reconciled    Review of Systems   Respiratory:  Negative for shortness of breath.    Cardiovascular:  Positive for leg swelling. Negative for chest pain, palpitations, orthopnea, claudication and PND.   Neurological:  Negative for dizziness, loss of consciousness and weakness.           Objective:   /60 (BP Location: Left arm, Patient Position: Sitting)   Pulse 97   Ht 6' 1" (1.854 m)   Wt 96.1 kg (211 lb 12.8 oz)   SpO2 96%   BMI 27.94 kg/m²     Physical Exam  Vitals and nursing note reviewed.   Constitutional:       Appearance: Normal appearance. He is obese.   HENT:      Head: Normocephalic and atraumatic.   Neck:      Vascular: No carotid bruit.   Cardiovascular:      Rate and Rhythm: Normal rate and regular rhythm.      Pulses: Normal pulses.      Heart sounds: Murmur heard.      Comments: II/VI HSM at the apex  Well healed ICD site to left upper chest wall  Pulmonary:      Effort: Pulmonary effort is normal.      Breath sounds: Normal breath sounds.   Abdominal:      Palpations: Abdomen is soft.   Musculoskeletal:      Cervical back: Neck supple.      Right lower leg: Edema present.      Left lower leg: Edema present.      Comments: 1+ NP BLE edema to the knees   Skin:     General: Skin is warm and dry.      Capillary Refill: Capillary refill takes less than 2 seconds.   Neurological:      Mental Status: He is alert.           Assessment:     1. Ischemic cardiomyopathy  furosemide (LASIX) 40 MG tablet    Comprehensive Metabolic Panel      2. High risk medication use  Comprehensive Metabolic Panel    Magnesium      3. Atherosclerosis of native coronary artery of native heart without angina pectoris        4. Atypical atrial flutter        5. Chronic systolic (congestive) heart failure        6. Hypertension, essential        7. Left ventricular thrombus        8. Bilateral lower extremity edema              Plan:   Atherosclerotic heart disease of native " "coronary artery without angina pectoris  [facility] 3/20/2015 - Dr. Cardenas  1. Three Vessel CAD wit patent graft to the LAD and OM  2. Moderate LV systolic dysfunction, LVEF 35-40%  3. Yuki LV hemodynamics    No chest pain   Continue Lipitor    Atrial flutter  Diagnosed 12/2022  Eliquis for CVA prophylaxis    Chronic systolic (congestive) heart failure  Patient is identified as having systolic heart failure that is chronic. CHF is currently uncontrolled. Latest ECHO performed and demonstrates- Results for orders placed during the hospital encounter of 02/17/23    Echo    Interpretation Summary  · Mild right ventricular enlargement with normal right ventricular systolic function.  · Moderate mitral regurgitation.  · Mild to moderate tricuspid regurgitation.  · Normal central venous pressure (3 mmHg).  · The estimated PA systolic pressure is 28 mmHg.  · Left ventricular diastolic dysfunction.  · Mildly decreased systolic function.  · The estimated ejection fraction is 40%.  · There are segmental left ventricular wall motion abnormalities.  · Mild left atrial enlargement.  . Continue lasix, aldactone,  and monitor clinical status closely.  Echocardiogram done today result pending  cmp today  NO  ACE/ARB/ARNidue to hypotension/CKD  No beta blocker due to hypotension/persistent Asthma  bp today 104/60 mmHg will consider starting low dose Entresto pending lab/echo results  Also consider adding Farxiga    Hypertension, essential  104/60 mmHg    Left ventricular thrombus  Diagnosed by echo 7/18/2021 at [facility] in [place], [place]  Continue Eliquis    Bilateral lower extremity edema  Severe BLE edema began approx 3 weeks after starting a "cancer med". The med has been stopped and his lasix increased from 20 mg po daily to 40 mg po daily as well as aldactone 50 mg po daily added to his regimen. He has had improvement but continues to have 1+ np BLE edema. Also contributing to his edema is hypoalbuminemia with a serum albumin of " 2.1 on 7/10/2023. He admits that he can't eat due to nausea issues.  He is on Eliquis for low suspicion for DVT.   Continue lasix 40 mg po daily  Increase protein intake  Follow up on echocardiogram results from today    RTC: 3 months

## 2023-08-02 NOTE — ASSESSMENT & PLAN NOTE
"Severe BLE edema began approx 3 weeks after starting a "cancer med". The med has been stopped and his lasix increased from 20 mg po daily to 40 mg po daily as well as aldactone 50 mg po daily added to his regimen. He has had improvement but continues to have 1+ np BLE edema. Also contributing to his edema is hypoalbuminemia with a serum albumin of 2.1 on 7/10/2023. He admits that he can't eat due to nausea issues.  He is on Eliquis for low suspicion for DVT.   Continue lasix 40 mg po daily  Increase protein intake  Follow up on echocardiogram results from today    "

## 2023-08-02 NOTE — ED TRIAGE NOTES
"Pt presents to ed c/o "not feeling right" after being stuck in elevator. Pt states he feels nauseated.   "

## 2023-08-02 NOTE — ASSESSMENT & PLAN NOTE
Patient is identified as having systolic heart failure that is chronic. CHF is currently uncontrolled. Latest ECHO performed and demonstrates- Results for orders placed during the hospital encounter of 02/17/23    Echo    Interpretation Summary  · Mild right ventricular enlargement with normal right ventricular systolic function.  · Moderate mitral regurgitation.  · Mild to moderate tricuspid regurgitation.  · Normal central venous pressure (3 mmHg).  · The estimated PA systolic pressure is 28 mmHg.  · Left ventricular diastolic dysfunction.  · Mildly decreased systolic function.  · The estimated ejection fraction is 40%.  · There are segmental left ventricular wall motion abnormalities.  · Mild left atrial enlargement.  . Continue lasix, aldactone,  and monitor clinical status closely.  Echocardiogram done today result pending  cmp today  NO  ACE/ARB/ARNidue to hypotension/CKD  No beta blocker due to hypotension/persistent Asthma  bp today 104/60 mmHg will consider starting low dose Entresto pending lab/echo results  Also consider adding Farxiga

## 2023-08-02 NOTE — ED PROVIDER NOTES
Encounter Date: 8/2/2023       History     Chief Complaint   Patient presents with    Anxiety     78 year old male presents to the emergency department to be evaluated because he got lightheaded while standing in the elevator that he got stuck in. He is not able to stand for more than a few minutes without getting lightheaded and had to  the elevator for 30 minutes. He said he feels well now. Denies any complaints at this time and requests to be discharged.     The history is provided by the patient.     Review of patient's allergies indicates:   Allergen Reactions    Pollen extracts     Hay fever and allergy relief Rash     Past Medical History:   Diagnosis Date    Aortic heart murmur     Asthma     Cancer     Deep vein thrombosis     Emphysema/COPD     Hyperlipidemia     Ischemic cardiomyopathy     Mitral regurgitation     Old myocardial infarction 03/2001    Tricuspid regurgitation     Vitamin B 12 deficiency     Vitamin D deficiency      Past Surgical History:   Procedure Laterality Date    CARDIAC CATHETERIZATION      CARDIAC DEFIBRILLATOR PLACEMENT      CHOLECYSTECTOMY      CORONARY ARTERY BYPASS GRAFT      TOTAL KNEE ARTHROPLASTY Bilateral      Family History   Problem Relation Age of Onset    Emphysema Father      Social History     Tobacco Use    Smoking status: Never     Passive exposure: Never    Smokeless tobacco: Never   Substance Use Topics    Alcohol use: Never    Drug use: Never     Review of Systems   Constitutional:  Negative for chills and fever.   Respiratory:  Negative for shortness of breath.    Cardiovascular:  Negative for chest pain.   Gastrointestinal:  Negative for abdominal pain, diarrhea, nausea and vomiting.   All other systems reviewed and are negative.      Physical Exam     Initial Vitals [08/02/23 1230]   BP Pulse Resp Temp SpO2   135/62 79 17 98.5 °F (36.9 °C) 100 %      MAP       --         Physical Exam    Vitals reviewed.  Constitutional: He appears well-developed and  well-nourished.   HENT:   Head: Normocephalic and atraumatic.   Eyes: EOM are normal. Pupils are equal, round, and reactive to light.   Neck: Neck supple.   Cardiovascular:  Normal rate and regular rhythm.           Pulmonary/Chest: Breath sounds normal.   Abdominal: Abdomen is soft. Bowel sounds are normal. He exhibits no distension and no mass. There is no abdominal tenderness. There is no rebound and no guarding.   Musculoskeletal:         General: Normal range of motion.      Cervical back: Neck supple.     Neurological: He is alert and oriented to person, place, and time. He has normal strength. GCS score is 15. GCS eye subscore is 4. GCS verbal subscore is 5. GCS motor subscore is 6.   Skin: Skin is warm and dry. Capillary refill takes less than 2 seconds.   Psychiatric: He has a normal mood and affect.         Medical Screening Exam   See Full Note    ED Course   Procedures  Labs Reviewed - No data to display       Imaging Results    None          Medications - No data to display  Medical Decision Makin year old male presents to the emergency department to be evaluated because he got lightheaded while standing in the elevator that he got stuck in. He is not able to stand for more than a few minutes without getting lightheaded and had to  the elevator for 30 minutes. He said he feels well now. Denies any complaints at this time and requests to be discharged.   Diagnosis: fatigue                         Clinical Impression:   Final diagnoses:  [R53.83] Fatigue, unspecified type (Primary)        ED Disposition Condition    Discharge Stable          ED Prescriptions    None       Follow-up Information    None          Liz Walter, ROB  [phone]

## 2023-08-07 LAB
AORTIC ROOT ANNULUS: 2.59 CM
AV INDEX (PROSTH): 0.57
AV MEAN GRADIENT: 8 MMHG
AV PEAK GRADIENT: 14 MMHG
AV VALVE AREA BY VELOCITY RATIO: 1.68 CM²
AV VALVE AREA: 1.99 CM²
AV VELOCITY RATIO: 0.48
BSA FOR ECHO PROCEDURE: 2.29 M2
CV ECHO LV RWT: 0.38 CM
DOP CALC AO PEAK VEL: 1.87 M/S
DOP CALC AO VTI: 37.9 CM
DOP CALC LVOT AREA: 3.5 CM2
DOP CALC LVOT DIAMETER: 2.11 CM
DOP CALC LVOT PEAK VEL: 0.9 M/S
DOP CALC LVOT STROKE VOLUME: 75.49 CM3
DOP CALCLVOT PEAK VEL VTI: 21.6 CM
E WAVE DECELERATION TIME: 325.29 MSEC
E/A RATIO: 0.47
E/E' RATIO: 5.37 M/S
ECHO LV POSTERIOR WALL: 1.01 CM (ref 0.6–1.1)
EJECTION FRACTION: 40 %
FRACTIONAL SHORTENING: 16 % (ref 28–44)
GLOBAL LONGITUIDAL STRAIN: 40 %
INTERVENTRICULAR SEPTUM: 1.02 CM (ref 0.6–1.1)
LEFT ATRIUM SIZE: 4.5 CM
LEFT INTERNAL DIMENSION IN SYSTOLE: 4.44 CM (ref 2.1–4)
LEFT VENTRICLE DIASTOLIC VOLUME INDEX: 60.69 ML/M2
LEFT VENTRICLE DIASTOLIC VOLUME: 133.51 ML
LEFT VENTRICLE MASS INDEX: 92 G/M2
LEFT VENTRICLE SYSTOLIC VOLUME INDEX: 40.8 ML/M2
LEFT VENTRICLE SYSTOLIC VOLUME: 89.71 ML
LEFT VENTRICULAR INTERNAL DIMENSION IN DIASTOLE: 5.27 CM (ref 3.5–6)
LEFT VENTRICULAR MASS: 202.49 G
LV LATERAL E/E' RATIO: 4.25 M/S
LV SEPTAL E/E' RATIO: 7.29 M/S
LVOT MG: 1.95 MMHG
LVOT MV: 0.67 CM/S
MV PEAK A VEL: 1.09 M/S
MV PEAK E VEL: 0.51 M/S
PISA TR MAX VEL: 2.45 M/S
PV PEAK GRADIENT: 4 MMHG
PV PEAK VELOCITY: 1.03 M/S
RIGHT ATRIAL AREA: 21 CM2
TDI LATERAL: 0.12 M/S
TDI SEPTAL: 0.07 M/S
TDI: 0.1 M/S
TR MAX PG: 24 MMHG
Z-SCORE OF LEFT VENTRICULAR DIMENSION IN END DIASTOLE: -3.55
Z-SCORE OF LEFT VENTRICULAR DIMENSION IN END SYSTOLE: -0.22

## 2023-08-19 ENCOUNTER — OUTSIDE PLACE OF SERVICE (OUTPATIENT)
Dept: CARDIOLOGY | Facility: HOSPITAL | Age: 78
End: 2023-08-19
Payer: MEDICARE

## 2023-08-19 PROCEDURE — 93010 ELECTROCARDIOGRAM REPORT: CPT | Mod: ,,, | Performed by: INTERNAL MEDICINE

## 2023-08-19 PROCEDURE — 93010 PR ELECTROCARDIOGRAM REPORT: ICD-10-PCS | Mod: ,,, | Performed by: INTERNAL MEDICINE

## 2023-08-22 ENCOUNTER — PATIENT OUTREACH (OUTPATIENT)
Dept: ADMINISTRATIVE | Facility: CLINIC | Age: 78
End: 2023-08-22

## 2023-08-24 ENCOUNTER — PATIENT OUTREACH (OUTPATIENT)
Dept: ADMINISTRATIVE | Facility: CLINIC | Age: 78
End: 2023-08-24

## 2023-09-13 ENCOUNTER — PATIENT OUTREACH (OUTPATIENT)
Dept: ADMINISTRATIVE | Facility: CLINIC | Age: 78
End: 2023-09-13

## 2023-09-13 ENCOUNTER — EXTERNAL HOSPITAL ADMISSION (OUTPATIENT)
Dept: ADMINISTRATIVE | Facility: CLINIC | Age: 78
End: 2023-09-13

## 2023-09-13 RX ORDER — SERTRALINE HYDROCHLORIDE 25 MG/1
25 TABLET, FILM COATED ORAL DAILY
COMMUNITY
Start: 2023-08-25 | End: 2023-10-09 | Stop reason: SDUPTHER

## 2023-09-13 RX ORDER — DIVALPROEX SODIUM 125 MG/1
500 CAPSULE, COATED PELLETS ORAL DAILY
COMMUNITY
Start: 2023-09-11 | End: 2023-10-09 | Stop reason: SDUPTHER

## 2023-09-13 NOTE — PROGRESS NOTES
C3 nurse spoke with Tiny Gutierrez's spouse, Taylor, for a TCC post hospital discharge follow up call. The patient has a scheduled HOSFU appointment with Raymon Tyler MD  on 9/18/2023 @ 1030.

## 2023-09-18 ENCOUNTER — OFFICE VISIT (OUTPATIENT)
Dept: FAMILY MEDICINE | Facility: CLINIC | Age: 78
End: 2023-09-18
Payer: MEDICARE

## 2023-09-18 VITALS
SYSTOLIC BLOOD PRESSURE: 103 MMHG | OXYGEN SATURATION: 98 % | BODY MASS INDEX: 22.86 KG/M2 | TEMPERATURE: 97 F | HEIGHT: 72 IN | HEART RATE: 83 BPM | DIASTOLIC BLOOD PRESSURE: 67 MMHG | WEIGHT: 168.81 LBS | RESPIRATION RATE: 16 BRPM

## 2023-09-18 DIAGNOSIS — R45.1 AGITATION: Chronic | ICD-10-CM

## 2023-09-18 DIAGNOSIS — F03.918 DEMENTIA WITH BEHAVIORAL DISTURBANCE: Primary | Chronic | ICD-10-CM

## 2023-09-18 DIAGNOSIS — I63.9 ISCHEMIC STROKE OF FRONTAL LOBE: Chronic | ICD-10-CM

## 2023-09-18 PROCEDURE — 99495 TCM SERVICES (MODERATE COMPLEXITY): ICD-10-PCS | Mod: ,,, | Performed by: FAMILY MEDICINE

## 2023-09-18 PROCEDURE — 99495 TRANSJ CARE MGMT MOD F2F 14D: CPT | Mod: ,,, | Performed by: FAMILY MEDICINE

## 2023-09-18 RX ORDER — LANOLIN ALCOHOL/MO/W.PET/CERES
400 CREAM (GRAM) TOPICAL
COMMUNITY
Start: 2023-08-25 | End: 2023-09-24

## 2023-09-18 RX ORDER — LORAZEPAM 0.5 MG/1
0.5 TABLET ORAL EVERY 12 HOURS PRN
Qty: 30 TABLET | Refills: 1 | Status: SHIPPED | OUTPATIENT
Start: 2023-09-18 | End: 2024-03-18

## 2023-09-18 NOTE — PROGRESS NOTES
Raymon Tyler MD    [address] [place] [address]     PATIENT NAME: Tiny Gutierrez  : 1945  DATE: 23  MRN: [id number]      Billing Provider: Raymon Tyler MD  Level of Service: FL OFFICE/OUTPT VISIT, EST, LEVL IV, 30-39 MIN  Patient PCP Information       Provider PCP Type    Raymon Tyler MD General            Reason for Visit / Chief Complaint: Transitional Care ([facility] 2023 to 2023 for Stroke/altered mental status. Went over medications with pt wife from D/C summary. She did not bring medication bottles to this visit.)       Update PCP  Update Chief Complaint         History of Present Illness / Problem Focused Workflow     Tiny Gutierrez presents to the clinic with Transitional Care ([facility] 2023 to 2023 for Stroke/altered mental status. Went over medications with pt wife from D/C summary. She did not bring medication bottles to this visit.)     He has lost over 50 pounds in the past 6 weeks. It appears he had a frontal stroke, and his behavior changed drastically afterwards. For several weeks he refused to eat, even being restrained for a couple of weeks to keep him from harming himself or staff. He was angry and incoherent at times. Ultimately he spent some time on the [facility] floor and started to feel better. Today he reports he is doing much better, but has lost a lot of weight, and gets angry really fast. He will snap on people a lot of quicker than he usually would. He is eating more now. His weight is up a pound since being home from the hospital, today is day 6 back at home.     During his chart review, it does appear the bile duct was positive for pancreatic cancer, lymph nodes were negative and the other margins were negative. This is worth noting.         HPI    Review of Systems     Review of Systems   Constitutional:  Negative for activity change,  appetite change, chills, fatigue and fever.   HENT:  Negative for nasal congestion, ear pain, hearing loss, postnasal drip and sore throat.    Respiratory:  Negative for cough, chest tightness, shortness of breath and wheezing.    Cardiovascular:  Negative for chest pain, palpitations, leg swelling and claudication.   Gastrointestinal:  Negative for abdominal pain, change in bowel habit, constipation, diarrhea, nausea, vomiting and change in bowel habit.   Genitourinary:  Negative for dysuria.   Musculoskeletal:  Negative for arthralgias, back pain, gait problem and myalgias.   Integumentary:  Negative for rash.   Neurological:  Negative for weakness and headaches.   Psychiatric/Behavioral:  Positive for agitation and behavioral problems. Negative for suicidal ideas. The patient is nervous/anxious.         Medical / Social / Family History     Past Medical History:   Diagnosis Date    Aortic heart murmur     Asthma     Cancer     Deep vein thrombosis     Emphysema/COPD     Hyperlipidemia     Ischemic cardiomyopathy     Mitral regurgitation     Old myocardial infarction 03/2001    Tricuspid regurgitation     Vitamin B 12 deficiency     Vitamin D deficiency        Past Surgical History:   Procedure Laterality Date    CARDIAC CATHETERIZATION      CARDIAC DEFIBRILLATOR PLACEMENT      CHOLECYSTECTOMY      CORONARY ARTERY BYPASS GRAFT      TOTAL KNEE ARTHROPLASTY Bilateral        Social History    reports that he has never smoked. He has never been exposed to tobacco smoke. He has never used smokeless tobacco. He reports that he does not drink alcohol and does not use drugs.    Family History  's family history includes Emphysema in his father.    Medications and Allergies     Medications  Outpatient Medications Marked as Taking for the 9/18/23 encounter (Office Visit) with Raymon Tyler MD   Medication Sig Dispense Refill    albuterol (PROVENTIL) 2.5 mg /3 mL (0.083 %) nebulizer solution 3 ml      albuterol  (PROVENTIL/VENTOLIN HFA) 90 mcg/actuation inhaler 2 puffs as needed      apixaban (ELIQUIS) 2.5 mg Tab Take 2.5 mg by mouth 2 (two) times daily.      BREO ELLIPTA 200-25 mcg/dose DsDv diskus inhaler 1 puff once daily.      calcium carbonate/vitamin D3 (CALTRATE 600 + D ORAL) Take 1 capsule by mouth Daily.      capecitabine (XELODA) 500 MG Tab Take 500 mg by mouth 2 (two) times daily Take 3 tablets twice daily after meals.      divalproex (DEPAKOTE SPRINKLE) 125 mg capsule Take 500 mg by mouth.      furosemide (LASIX) 40 MG tablet Take 1 tablet (40 mg total) by mouth 2 (two) times daily. 60 tablet 11    lipase-protease-amylase 12,000-38,000-60,000 units (CREON) CpDR Take by mouth.      magnesium oxide (MAG-OX) 400 mg (241.3 mg magnesium) tablet Take 400 mg by mouth.      mometasone (NASONEX) 50 mcg/actuation nasal spray 2 sprays in each nostril      omega 3-dha-epa-fish oil 1,000 mg (120 mg-180 mg) Cap 1 capsule      sertraline (ZOLOFT) 25 MG tablet Take 25 mg by mouth.      spironolactone (ALDACTONE) 50 MG tablet Take 50 mg by mouth Daily.      triamcinolone acetonide (NASACORT AQ NASL)          Allergies  Review of patient's allergies indicates:   Allergen Reactions    Pollen extracts     Hay fever and allergy relief Rash       Physical Examination     Vitals:    09/18/23 1035   BP: 103/67   Pulse: 83   Resp: 16   Temp: 97.1 °F (36.2 °C)     Physical Exam  Vitals and nursing note reviewed.   Constitutional:       General: He is not in acute distress.     Appearance: Normal appearance. He is underweight. He is ill-appearing.   Eyes:      Extraocular Movements: Extraocular movements intact.      Pupils: Pupils are equal, round, and reactive to light.   Cardiovascular:      Rate and Rhythm: Normal rate and regular rhythm.      Heart sounds: Normal heart sounds.   Pulmonary:      Effort: Pulmonary effort is normal.      Breath sounds: Normal breath sounds.   Abdominal:      General: Bowel sounds are normal.       Palpations: Abdomen is soft.   Musculoskeletal:         General: Normal range of motion.   Skin:     Findings: No rash.   Neurological:      General: No focal deficit present.      Mental Status: He is alert and oriented to person, place, and time. Mental status is at baseline.   Psychiatric:         Mood and Affect: Mood normal.         Behavior: Behavior normal.            Assessment and Plan (including Health Maintenance)      Problem List  Smart Sets  Document Outside HM   :    Plan:     Short term ativan PRN for agitation.     I can only presume that his behavioral problem is linked to the frontal stroke. He has been very sick for over a year, some agitation would be expected given the extent of his medical problems.     He had some hallucinations, no clear cause other than the frontal stroke, he had also tried some marijuana gummies before the symptoms started.       Health Maintenance Due   Topic Date Due    Shingles Vaccine (1 of 2) Never done    TETANUS VACCINE  09/17/2012    Influenza Vaccine (1) 09/01/2023       Problem List Items Addressed This Visit          Neuro    Dementia with behavioral disturbance - Primary (Chronic)    Relevant Medications    LORazepam (ATIVAN) 0.5 MG tablet    Ischemic stroke of frontal lobe (Chronic)       Psychiatric    Agitation (Chronic)       Health Maintenance Topics with due status: Not Due       Topic Last Completion Date    Colonoscopy 06/30/2014    Lipid Panel 09/03/2023       Procedures     Future Appointments   Date Time Provider Department Center   9/26/2023 10:30 AM James Hughes MD [facility]   10/19/2023 12:45 PM Raymon Tyler MD [facility]OT[facility] Diaz        Follow up in about 1 month (around 10/18/2023) for chronic health problems.     Signature:  Raymon Tyler MD  [address] [place] [address]  Phone #: [phone]  Fax #: [phone]    Date of encounter: 9/18/23    There are no  Patient Instructions on file for this visit.

## 2023-09-25 ENCOUNTER — EXTERNAL HOME HEALTH (OUTPATIENT)
Dept: HOME HEALTH SERVICES | Facility: HOSPITAL | Age: 78
End: 2023-09-25
Payer: MEDICARE

## 2023-09-26 ENCOUNTER — OFFICE VISIT (OUTPATIENT)
Dept: CARDIOLOGY | Facility: CLINIC | Age: 78
End: 2023-09-26
Payer: MEDICARE

## 2023-09-26 VITALS
WEIGHT: 181 LBS | DIASTOLIC BLOOD PRESSURE: 60 MMHG | BODY MASS INDEX: 24.52 KG/M2 | OXYGEN SATURATION: 98 % | HEIGHT: 72 IN | SYSTOLIC BLOOD PRESSURE: 98 MMHG | HEART RATE: 90 BPM

## 2023-09-26 DIAGNOSIS — I25.5 ISCHEMIC CARDIOMYOPATHY: Chronic | ICD-10-CM

## 2023-09-26 DIAGNOSIS — I48.4 ATYPICAL ATRIAL FLUTTER: Chronic | ICD-10-CM

## 2023-09-26 DIAGNOSIS — J44.9 CHRONIC OBSTRUCTIVE PULMONARY DISEASE, UNSPECIFIED COPD TYPE: Chronic | ICD-10-CM

## 2023-09-26 DIAGNOSIS — I25.10 CORONARY ARTERY DISEASE INVOLVING NATIVE CORONARY ARTERY OF NATIVE HEART WITHOUT ANGINA PECTORIS: Primary | Chronic | ICD-10-CM

## 2023-09-26 PROCEDURE — 99214 PR OFFICE/OUTPT VISIT, EST, LEVL IV, 30-39 MIN: ICD-10-PCS | Mod: S$PBB,,, | Performed by: INTERNAL MEDICINE

## 2023-09-26 PROCEDURE — 99214 OFFICE O/P EST MOD 30 MIN: CPT | Mod: S$PBB,,, | Performed by: INTERNAL MEDICINE

## 2023-09-26 PROCEDURE — 93010 ELECTROCARDIOGRAM REPORT: CPT | Mod: S$PBB,,, | Performed by: INTERNAL MEDICINE

## 2023-09-26 PROCEDURE — 93010 EKG 12-LEAD: ICD-10-PCS | Mod: S$PBB,,, | Performed by: INTERNAL MEDICINE

## 2023-09-26 PROCEDURE — 99214 OFFICE O/P EST MOD 30 MIN: CPT | Mod: PBBFAC | Performed by: INTERNAL MEDICINE

## 2023-09-26 PROCEDURE — 93005 ELECTROCARDIOGRAM TRACING: CPT | Mod: PBBFAC | Performed by: INTERNAL MEDICINE

## 2023-09-27 ENCOUNTER — DOCUMENT SCAN (OUTPATIENT)
Dept: HOME HEALTH SERVICES | Facility: HOSPITAL | Age: 78
End: 2023-09-27
Payer: MEDICARE

## 2023-09-27 ENCOUNTER — EXTERNAL HOME HEALTH (OUTPATIENT)
Dept: HOME HEALTH SERVICES | Facility: HOSPITAL | Age: 78
End: 2023-09-27
Payer: MEDICARE

## 2023-09-27 NOTE — PROGRESS NOTES
"PCP: Raymon Tyler MD    Referring Provider:     Subjective:   Tiny Gutierrez is a 78 y.o. male with hx of CAD s/p CABG (2007), iCMP s/p ICD, HTN, HLD, CKD, COPD, prostate cancer, pancreatic cancer, LV thrombus ( 7/18/2021 echo, [facility] [place], [place]) and atrial flutter,  who presents for follow up after last hospitalization.      8/2/23--Tiny Gutierrez is a 78 y.o. male with hx of CAD s/p CABG (2007), iCMP s/p ICD, HTN, HLD, asthma, CKD, COPD, prostate cancer, pancreatic cancer, LV thrombus ( 7/18/2021 echo, [facility] [place] [place]) and atrial flutter,  who presents for return check requested for severe BLE edema. He denies chest pain , orthopna or PND but has chronic, stable CONRAD. The lower ext edema began approx 3 weeks ago after starting a "cancer med". This med has been stopped by his oncologist and lasix dose increased to 40 mg with the addition of aldactone 50 mg po daily with improvement but not resolution of the edema. His serum albumin is also 2.1 which is a contributing factor. An echocardiogram was done earlier today and results are pending.         Fhx:  Family History   Problem Relation Age of Onset    Emphysema Father      Shx:   Social History     Socioeconomic History    Marital status:    Tobacco Use    Smoking status: Never     Passive exposure: Never    Smokeless tobacco: Never   Substance and Sexual Activity    Alcohol use: Never    Drug use: Never    Sexual activity: Yes     Partners: Female       EKG   9/26/23--NSR, rightward axis, 85 bpm     Echo    8/30/23--EF 40%.  Focal apical akinesis.  LV mildly dilated. Left atrium moderately dilated.  Mild AS.  Mild MR.     8/2/23--Interpretation Summary    Left Ventricle: regional wall motion abnormalities present. There is mildly reduced systolic function. Ejection fraction by visual approximation is 40%. There is diastolic dysfunction.    Left Atrium: Left atrium is mildly dilated.    Right Atrium: Right atrium is mildly dilated. Catheter " present in the right atrium.    Aortic Valve: There is moderate aortic valve sclerosis.    Mitral Valve: There is mild to moderate regurgitation.    Tricuspid Valve: There is mild transvalvular regurgitation.      2/17/23--Interpretation Summary  · Mild right ventricular enlargement with normal right ventricular systolic function.  · Moderate mitral regurgitation.  · Mild to moderate tricuspid regurgitation.  · Normal central venous pressure (3 mmHg).  · The estimated PA systolic pressure is 28 mmHg.  · Left ventricular diastolic dysfunction.  · Mildly decreased systolic function.  · The estimated ejection fraction is 40%.  · There are segmental left ventricular wall motion abnormalities.  · Mild left atrial enlargement.    LHC 3/20/15--3 V CAD with patent grafts to the LAD and obtuse marginal.  Moderate LV systolic dysfunction, LVEF 35-40%.  Normal LV hemodynamics.      3/28/07--3 V CAD.  Segmental wall motion abnormality with severely reduced LVSF, EF 20%.      CABG:     3/29/07--LIMA to LAD, SVG to RCA and OM.     ICD:     10/3/19--R/R end of life ICD with new MRI compatible ICD.      8/8/13--R/ R end of life ICD with new dual chamber ICD.     1/15/09--implantation of dual chamber PPM ICD.  .        Lab Results   Component Value Date     08/02/2023    K 4.9 08/02/2023     08/02/2023    CO2 27 08/02/2023    BUN 8 08/02/2023    CREATININE 1.17 08/02/2023    CALCIUM 8.9 08/02/2023    ANIONGAP 12 08/02/2023    EGFRNONAA 67 10/27/2021       Lab Results   Component Value Date    CHOL 185 10/27/2021    CHOL 119 08/18/2020     Lab Results   Component Value Date    HDL 54 10/27/2021    HDL 44 08/18/2020     Lab Results   Component Value Date    LDLCALC 112 10/27/2021    LDLCALC 57 08/18/2020     Lab Results   Component Value Date    TRIG 97 10/27/2021    TRIG 90 08/18/2020     Lab Results   Component Value Date    CHOLHDL 3.4 10/27/2021    CHOLHDL 2.7 08/18/2020       Lab Results   Component Value Date    WBC  5.09 07/10/2023    HGB 11.1 (L) 07/10/2023    HCT 33.7 (L) 07/10/2023    MCV 96.0 07/10/2023     (L) 07/10/2023           Current Outpatient Medications:     albuterol (PROVENTIL) 2.5 mg /3 mL (0.083 %) nebulizer solution, Take by nebulization as needed for Wheezing or Shortness of Breath., Disp: , Rfl:     albuterol (PROVENTIL/VENTOLIN HFA) 90 mcg/actuation inhaler, 2 puffs as needed, Disp: , Rfl:     apixaban (ELIQUIS) 2.5 mg Tab, Take 2.5 mg by mouth 2 (two) times daily., Disp: , Rfl:     atorvastatin (LIPITOR) 40 MG tablet, Take 1 tablet (40 mg total) by mouth once daily., Disp: 90 tablet, Rfl: 1    BREO ELLIPTA 200-25 mcg/dose DsDv diskus inhaler, 1 puff once daily., Disp: , Rfl:     calcium carbonate/vitamin D3 (CALTRATE 600 + D ORAL), Take 1 capsule by mouth Daily., Disp: , Rfl:     divalproex (DEPAKOTE SPRINKLE) 125 mg capsule, Take 500 mg by mouth., Disp: , Rfl:     EScitalopram oxalate (LEXAPRO) 10 MG tablet, Take 10 mg by mouth., Disp: , Rfl:     furosemide (LASIX) 40 MG tablet, Take 1 tablet (40 mg total) by mouth 2 (two) times daily. (Patient taking differently: Take 20 mg by mouth once daily.), Disp: 60 tablet, Rfl: 11    lipase-protease-amylase 12,000-38,000-60,000 units (CREON) CpDR, Take by mouth., Disp: , Rfl:     LORazepam (ATIVAN) 0.5 MG tablet, Take 1 tablet (0.5 mg total) by mouth every 12 (twelve) hours as needed (for ANXIETY and AGITATION)., Disp: 30 tablet, Rfl: 1    mometasone (NASONEX) 50 mcg/actuation nasal spray, 2 sprays in each nostril, Disp: , Rfl:     omega 3-dha-epa-fish oil 1,000 mg (120 mg-180 mg) Cap, 1 capsule, Disp: , Rfl:     sertraline (ZOLOFT) 25 MG tablet, Take 25 mg by mouth., Disp: , Rfl:     spironolactone (ALDACTONE) 50 MG tablet, Take 50 mg by mouth Daily., Disp: , Rfl:     capecitabine (XELODA) 500 MG Tab, Take 500 mg by mouth 2 (two) times daily Take 3 tablets twice daily after meals., Disp: , Rfl:     HYDROcodone-acetaminophen (NORCO) 5-325 mg per tablet,  Take 1 tablet by mouth every 6 (six) hours as needed., Disp: , Rfl:     melatonin (MELATIN), Take 3 mg by mouth., Disp: , Rfl:     ondansetron (ZOFRAN-ODT) 4 MG TbDL, Take 4 mg by mouth every 8 (eight) hours as needed., Disp: , Rfl:     triamcinolone acetonide (NASACORT AQ NASL), , Disp: , Rfl:   Medications reconciled by pt's list.     Review of Systems   Respiratory:  Negative for shortness of breath.    Cardiovascular:  Positive for leg swelling. Negative for chest pain and palpitations.   Neurological:  Positive for dizziness. Negative for loss of consciousness and weakness.           Objective:   BP 98/60 (BP Location: Left arm, Patient Position: Sitting)   Pulse 90   Ht 6' (1.829 m)   Wt 82.1 kg (181 lb)   SpO2 98%   BMI 24.55 kg/m²     Physical Exam  Vitals reviewed.   Constitutional:       Appearance: Normal appearance.   HENT:      Head: Normocephalic and atraumatic.   Neck:      Vascular: No carotid bruit or JVD.   Cardiovascular:      Rate and Rhythm: Normal rate and regular rhythm.      Pulses: Normal pulses.           Radial pulses are 2+ on the right side and 2+ on the left side.        Dorsalis pedis pulses are 2+ on the right side and 2+ on the left side.      Heart sounds: Murmur heard.      Comments: II/VI HSM LLSB    Pulmonary:      Effort: Pulmonary effort is normal.      Breath sounds: Normal breath sounds.   Chest:      Comments: Well healed ICD site to left upper chest wall  Musculoskeletal:      Right lower leg: No edema.      Left lower leg: No edema.      Comments: 1+ NP BLE edema to the knees   Skin:     General: Skin is warm and dry.   Neurological:      Mental Status: He is alert and oriented to person, place, and time.           Assessment:     1. Coronary artery disease involving native coronary artery of native heart without angina pectoris  EKG 12-lead    EKG 12-lead      2. Atypical atrial flutter  EKG 12-lead    EKG 12-lead      3. Ischemic cardiomyopathy      s/p ICD      4.  Chronic obstructive pulmonary disease, unspecified COPD type              Plan:   Use lasix prn  Continue ICD monitoring as scheduled.  F/u in 6 months.

## 2023-10-02 ENCOUNTER — DOCUMENT SCAN (OUTPATIENT)
Dept: HOME HEALTH SERVICES | Facility: HOSPITAL | Age: 78
End: 2023-10-02
Payer: MEDICARE

## 2023-10-02 ENCOUNTER — OFFICE VISIT (OUTPATIENT)
Dept: FAMILY MEDICINE | Facility: CLINIC | Age: 78
End: 2023-10-02
Payer: MEDICARE

## 2023-10-02 VITALS
SYSTOLIC BLOOD PRESSURE: 87 MMHG | RESPIRATION RATE: 20 BRPM | OXYGEN SATURATION: 95 % | HEIGHT: 72 IN | WEIGHT: 182 LBS | TEMPERATURE: 99 F | HEART RATE: 93 BPM | DIASTOLIC BLOOD PRESSURE: 60 MMHG | BODY MASS INDEX: 24.65 KG/M2

## 2023-10-02 DIAGNOSIS — C25.7 MALIGNANT NEOPLASM OF OTHER PARTS OF PANCREAS: Primary | Chronic | ICD-10-CM

## 2023-10-02 DIAGNOSIS — I10 PRIMARY HYPERTENSION: Chronic | ICD-10-CM

## 2023-10-02 DIAGNOSIS — C61 PROSTATE CANCER: Chronic | ICD-10-CM

## 2023-10-02 PROCEDURE — 99214 PR OFFICE/OUTPT VISIT, EST, LEVL IV, 30-39 MIN: ICD-10-PCS | Mod: ,,, | Performed by: FAMILY MEDICINE

## 2023-10-02 PROCEDURE — 99214 OFFICE O/P EST MOD 30 MIN: CPT | Mod: ,,, | Performed by: FAMILY MEDICINE

## 2023-10-02 NOTE — PROGRESS NOTES
Raymon Tyler MD    [address] [place] [address]     PATIENT NAME: Tiny Gutierrez  : 1945  DATE: 10/2/23  MRN: [id number]      Billing Provider: Raymon Tyler MD  Level of Service: CA OFFICE/OUTPT VISIT, EST, LEVL IV, 30-39 MIN  Patient PCP Information       Provider PCP Type    Raymon Tyler MD General            Reason for Visit / Chief Complaint: Fall (Patient complain of fall on Thursday night. He got up to go to the bathroom and fell. The door knob hit him in his right lower back/ side area. Small bruise noted to area.  Right elbow has a skin tear noted. )       Update PCP  Update Chief Complaint         History of Present Illness / Problem Focused Workflow     Tiny Gutierrez presents to the clinic with Fall (Patient complain of fall on Thursday night. He got up to go to the bathroom and fell. The door knob hit him in his right lower back/ side area. Small bruise noted to area.  Right elbow has a skin tear noted. )     No major injury, he has a skin tear on his right forearm and a bruise on his right flank, he mainly asks for reassurance    Blood pressure is 87/60 today in clinic    HPI    Review of Systems     Review of Systems   Constitutional:  Negative for activity change, appetite change, chills, fatigue and fever.   HENT:  Negative for nasal congestion, ear pain, hearing loss, postnasal drip and sore throat.    Respiratory:  Negative for cough, chest tightness, shortness of breath and wheezing.    Cardiovascular:  Negative for chest pain, palpitations, leg swelling and claudication.   Gastrointestinal:  Negative for abdominal pain, change in bowel habit, constipation, diarrhea, nausea and vomiting.   Genitourinary:  Negative for dysuria.   Musculoskeletal:  Negative for arthralgias, back pain, gait problem and myalgias.   Integumentary:  Negative for rash.   Neurological:  Negative for weakness and headaches.    Psychiatric/Behavioral:  Negative for suicidal ideas. The patient is not nervous/anxious.         Medical / Social / Family History     Past Medical History:   Diagnosis Date    Aortic heart murmur     Asthma     Cancer     Deep vein thrombosis     Emphysema/COPD     Hyperlipidemia     Ischemic cardiomyopathy     Mitral regurgitation     Old myocardial infarction 03/2001    Tricuspid regurgitation     Vitamin B 12 deficiency     Vitamin D deficiency        Past Surgical History:   Procedure Laterality Date    CARDIAC CATHETERIZATION      CARDIAC DEFIBRILLATOR PLACEMENT      CHOLECYSTECTOMY      CORONARY ARTERY BYPASS GRAFT      TOTAL KNEE ARTHROPLASTY Bilateral        Social History    reports that he has never smoked. He has never been exposed to tobacco smoke. He has never used smokeless tobacco. He reports that he does not drink alcohol and does not use drugs.    Family History  's family history includes Emphysema in his father.    Medications and Allergies     Medications  Outpatient Medications Marked as Taking for the 10/2/23 encounter (Office Visit) with Raymon Tyler MD   Medication Sig Dispense Refill    albuterol (PROVENTIL) 2.5 mg /3 mL (0.083 %) nebulizer solution Take by nebulization as needed for Wheezing or Shortness of Breath.      albuterol (PROVENTIL/VENTOLIN HFA) 90 mcg/actuation inhaler 2 puffs as needed      apixaban (ELIQUIS) 2.5 mg Tab Take 2.5 mg by mouth 2 (two) times daily.      atorvastatin (LIPITOR) 40 MG tablet Take 1 tablet (40 mg total) by mouth once daily. 90 tablet 1    BREO ELLIPTA 200-25 mcg/dose DsDv diskus inhaler 1 puff once daily.      calcium carbonate/vitamin D3 (CALTRATE 600 + D ORAL) Take 1 capsule by mouth Daily.      capecitabine (XELODA) 500 MG Tab Take 500 mg by mouth 2 (two) times daily Take 3 tablets twice daily after meals.      divalproex (DEPAKOTE SPRINKLE) 125 mg capsule Take 500 mg by mouth.      HYDROcodone-acetaminophen (NORCO) 5-325 mg per  tablet Take 1 tablet by mouth every 6 (six) hours as needed.      lipase-protease-amylase 12,000-38,000-60,000 units (CREON) CpDR Take by mouth.      LORazepam (ATIVAN) 0.5 MG tablet Take 1 tablet (0.5 mg total) by mouth every 12 (twelve) hours as needed (for ANXIETY and AGITATION). 30 tablet 1    omega 3-dha-epa-fish oil 1,000 mg (120 mg-180 mg) Cap 1 capsule      ondansetron (ZOFRAN-ODT) 4 MG TbDL Take 4 mg by mouth every 8 (eight) hours as needed.      sertraline (ZOLOFT) 25 MG tablet Take 25 mg by mouth.      spironolactone (ALDACTONE) 50 MG tablet Take 50 mg by mouth Daily.         Allergies  Review of patient's allergies indicates:   Allergen Reactions    Pollen extracts     Hay fever and allergy relief Rash       Physical Examination     Vitals:    10/02/23 1508   BP: (!) 87/60   Pulse: 93   Resp: 20   Temp: 98.5 °F (36.9 °C)     Physical Exam  Vitals and nursing note reviewed.   Constitutional:       General: He is not in acute distress.     Appearance: Normal appearance. He is not ill-appearing.   Eyes:      Extraocular Movements: Extraocular movements intact.      Pupils: Pupils are equal, round, and reactive to light.   Cardiovascular:      Rate and Rhythm: Normal rate and regular rhythm.      Heart sounds: Normal heart sounds.   Pulmonary:      Effort: Pulmonary effort is normal.      Breath sounds: Normal breath sounds.   Abdominal:      General: Bowel sounds are normal.      Palpations: Abdomen is soft.   Musculoskeletal:         General: Normal range of motion.   Skin:     Findings: No rash.   Neurological:      General: No focal deficit present.      Mental Status: He is alert and oriented to person, place, and time. Mental status is at baseline.   Psychiatric:         Mood and Affect: Mood normal.         Behavior: Behavior normal.            Assessment and Plan (including Health Maintenance)      Problem List  Smart Sets  Document Outside HM   :    Plan:     Concerned about his blood pressure,  monitor this at home, his lasix is now at once per day, 20mg, instead of BID at 40mg. Still may be getting somewhat dehydrated.     Fall - No Xray today, continue to monitor symptoms for improvement    He is now getting chemotherapy on Wednesday      Health Maintenance Due   Topic Date Due    Shingles Vaccine (1 of 2) Never done    TETANUS VACCINE  09/17/2012    Influenza Vaccine (1) 09/01/2023       Problem List Items Addressed This Visit          Cardiac/Vascular    Primary hypertension (Chronic)    Overview      - Goal blood pressure for most patients is less than 130/85. Both numbers are important. If you have questions about your specific goal blood pressure, please ask at your clinic visits.   - Check blood pressure outside of clinic, record the numbers, and bring the log to all your office visits.   - If you have any chest pain, SOB, or other concerning symptoms, report these immediately, or go to the nearest ER.    - Recommend cardiovascular exercise, at least 3 times per week, for at least 15 minutes.   - Eat a heart healthy diet.               Oncology    Prostate cancer (Chronic)    Malignant neoplasm of other parts of pancreas - Primary (Chronic)    Overview     Whipple procedure performed at [facility] in [place], [place]. By Dr. Juancho Yan. Early 2023             Health Maintenance Topics with due status: Not Due       Topic Last Completion Date    Colonoscopy 06/30/2014    Lipid Panel 09/03/2023       Procedures     Future Appointments   Date Time Provider Department Center   10/19/2023 12:45 PM Raymon Tyler MD [facility] [place]   3/28/2024  2:15 PM James Hughes MD [facility]        Follow up if symptoms worsen or fail to improve.     Signature:  Raymon Tyler MD  [address] [place] [address]  Phone #: [phone]  Fax #: [phone]    Date of encounter: 10/2/23    There are no Patient Instructions on file  for this visit.

## 2023-10-04 ENCOUNTER — DOCUMENT SCAN (OUTPATIENT)
Dept: HOME HEALTH SERVICES | Facility: HOSPITAL | Age: 78
End: 2023-10-04
Payer: MEDICARE

## 2023-10-09 ENCOUNTER — HOSPITAL ENCOUNTER (OUTPATIENT)
Dept: CARDIOLOGY | Facility: HOSPITAL | Age: 78
Discharge: HOME | End: 2023-10-09
Attending: INTERNAL MEDICINE
Payer: MEDICARE

## 2023-10-09 DIAGNOSIS — F32.A DEPRESSION, UNSPECIFIED DEPRESSION TYPE: Primary | ICD-10-CM

## 2023-10-09 DIAGNOSIS — Z95.810 PRESENCE OF DOUBLE CHAMBER AUTOMATIC CARDIOVERTER/DEFIBRILLATOR (AICD): ICD-10-CM

## 2023-10-09 DIAGNOSIS — Z95.810 PRESENCE OF DOUBLE CHAMBER AUTOMATIC CARDIOVERTER/DEFIBRILLATOR (AICD): Primary | ICD-10-CM

## 2023-10-09 PROCEDURE — 93295 DEV INTERROG REMOTE 1/2/MLT: CPT | Mod: ,,, | Performed by: INTERNAL MEDICINE

## 2023-10-09 PROCEDURE — 93296 REM INTERROG EVL PM/IDS: CPT

## 2023-10-09 PROCEDURE — 93295 CARDIAC DEVICE CHECK - REMOTE: ICD-10-PCS | Mod: ,,, | Performed by: INTERNAL MEDICINE

## 2023-10-09 RX ORDER — SERTRALINE HYDROCHLORIDE 25 MG/1
25 TABLET, FILM COATED ORAL DAILY
Qty: 90 TABLET | Refills: 1 | Status: SHIPPED | OUTPATIENT
Start: 2023-10-09

## 2023-10-09 RX ORDER — DIVALPROEX SODIUM 125 MG/1
500 CAPSULE, COATED PELLETS ORAL DAILY
Qty: 120 CAPSULE | Refills: 5 | Status: SHIPPED | OUTPATIENT
Start: 2023-10-09

## 2023-10-09 NOTE — TELEPHONE ENCOUNTER
Patients wife called requesting a refill on depakote sprinkles, and zoloft to go to [facility].

## 2023-10-19 ENCOUNTER — HOSPITAL ENCOUNTER (OUTPATIENT)
Dept: RADIOLOGY | Facility: HOSPITAL | Age: 78
Discharge: HOME | End: 2023-10-19
Attending: FAMILY MEDICINE
Payer: MEDICARE

## 2023-10-19 ENCOUNTER — OFFICE VISIT (OUTPATIENT)
Dept: FAMILY MEDICINE | Facility: CLINIC | Age: 78
End: 2023-10-19
Payer: MEDICARE

## 2023-10-19 VITALS
RESPIRATION RATE: 16 BRPM | HEIGHT: 72 IN | TEMPERATURE: 98 F | HEART RATE: 70 BPM | OXYGEN SATURATION: 98 % | SYSTOLIC BLOOD PRESSURE: 114 MMHG | DIASTOLIC BLOOD PRESSURE: 67 MMHG | WEIGHT: 190.38 LBS | BODY MASS INDEX: 25.78 KG/M2

## 2023-10-19 DIAGNOSIS — I35.8 AORTIC HEART MURMUR: Chronic | ICD-10-CM

## 2023-10-19 DIAGNOSIS — Z23 ENCOUNTER FOR IMMUNIZATION: ICD-10-CM

## 2023-10-19 DIAGNOSIS — E78.2 MIXED HYPERLIPIDEMIA: Primary | Chronic | ICD-10-CM

## 2023-10-19 DIAGNOSIS — I10 PRIMARY HYPERTENSION: Chronic | ICD-10-CM

## 2023-10-19 DIAGNOSIS — R07.81 RIB PAIN ON RIGHT SIDE: ICD-10-CM

## 2023-10-19 DIAGNOSIS — K21.9 GASTROESOPHAGEAL REFLUX DISEASE WITHOUT ESOPHAGITIS: Chronic | ICD-10-CM

## 2023-10-19 PROCEDURE — G0008 ADMIN INFLUENZA VIRUS VAC: HCPCS | Mod: ,,, | Performed by: FAMILY MEDICINE

## 2023-10-19 PROCEDURE — 71100 X-RAY EXAM RIBS UNI 2 VIEWS: CPT | Mod: TC,PN,RT

## 2023-10-19 PROCEDURE — G0008 FLU VACCINE - QUADRIVALENT - ADJUVANTED: ICD-10-PCS | Mod: ,,, | Performed by: FAMILY MEDICINE

## 2023-10-19 PROCEDURE — 90694 FLU VACCINE - QUADRIVALENT - ADJUVANTED: ICD-10-PCS | Mod: ,,, | Performed by: FAMILY MEDICINE

## 2023-10-19 PROCEDURE — 90694 VACC AIIV4 NO PRSRV 0.5ML IM: CPT | Mod: ,,, | Performed by: FAMILY MEDICINE

## 2023-10-19 PROCEDURE — 99214 PR OFFICE/OUTPT VISIT, EST, LEVL IV, 30-39 MIN: ICD-10-PCS | Mod: ,,, | Performed by: FAMILY MEDICINE

## 2023-10-19 PROCEDURE — 99214 OFFICE O/P EST MOD 30 MIN: CPT | Mod: ,,, | Performed by: FAMILY MEDICINE

## 2023-10-19 RX ORDER — OMEPRAZOLE 40 MG/1
40 CAPSULE, DELAYED RELEASE ORAL
Qty: 180 CAPSULE | Refills: 1 | Status: SHIPPED | OUTPATIENT
Start: 2023-10-19

## 2023-10-19 RX ORDER — ATORVASTATIN CALCIUM 40 MG/1
40 TABLET, FILM COATED ORAL DAILY
Qty: 90 TABLET | Refills: 1 | Status: SHIPPED | OUTPATIENT
Start: 2023-10-19 | End: 2024-04-16

## 2023-10-19 RX ORDER — MIRTAZAPINE 15 MG/1
7.5-15 TABLET, FILM COATED ORAL NIGHTLY
COMMUNITY
Start: 2023-10-16

## 2023-10-19 NOTE — PROGRESS NOTES
Raymon Tyler MD    [address] [place] [address]     PATIENT NAME: Tiny Gutierrez  : 1945  DATE: 10/19/23  MRN: [id number]      Billing Provider: Raymon Tyler MD  Level of Service: ME OFFICE/OUTPT VISIT, EST, LEVL IV, 30-39 MIN  Patient PCP Information       Provider PCP Type    Raymon Tyler MD General            Reason for Visit / Chief Complaint: Follow-up (1 month follow up from  on 2023. He states he is still sore from fall.)       Update PCP  Update Chief Complaint         History of Present Illness / Problem Focused Workflow     Tiny Gutierrez presents to the clinic with Follow-up (1 month follow up from  on 2023. He states he is still sore from fall.)     Landed on his right ribs, this was about 3 weeks ago, still has pain in the right ribs and worries about a possible broken rib. He would feel much more relaxed about it, if we would Xray the ribs to be sure they are not broken.     He has been through a lot with pancreatic cancer, surgery, and now treatment for the pancreatic cancer. Also has a history of prostate cancer and had a PSA drawn from Oncology recently.     He reports some mild burning in his chest, tums relived it. This happened yesterday as well. Seems to wax and wane, and is not severe.     HPI    Review of Systems     Review of Systems   Constitutional:  Negative for activity change, appetite change, chills, fatigue and fever.   HENT:  Negative for nasal congestion, ear pain, hearing loss, postnasal drip and sore throat.    Respiratory:  Negative for cough, chest tightness, shortness of breath and wheezing.    Cardiovascular:  Negative for chest pain, palpitations, leg swelling and claudication.   Gastrointestinal:  Positive for reflux. Negative for abdominal pain, change in bowel habit, constipation, diarrhea, nausea and vomiting.   Genitourinary:  Negative for dysuria.   Musculoskeletal:   Positive for arthralgias and myalgias. Negative for back pain and gait problem.   Integumentary:  Negative for rash.   Neurological:  Negative for weakness and headaches.   Psychiatric/Behavioral:  Negative for suicidal ideas. The patient is not nervous/anxious.         Medical / Social / Family History     Past Medical History:   Diagnosis Date    Aortic heart murmur     Asthma     Cancer     Deep vein thrombosis     Emphysema/COPD     Hyperlipidemia     Ischemic cardiomyopathy     Mitral regurgitation     Old myocardial infarction 03/2001    Tricuspid regurgitation     Vitamin B 12 deficiency     Vitamin D deficiency        Past Surgical History:   Procedure Laterality Date    CARDIAC CATHETERIZATION      CARDIAC DEFIBRILLATOR PLACEMENT      CHOLECYSTECTOMY      CORONARY ARTERY BYPASS GRAFT      TOTAL KNEE ARTHROPLASTY Bilateral        Social History    reports that he has never smoked. He has never been exposed to tobacco smoke. He has never used smokeless tobacco. He reports that he does not drink alcohol and does not use drugs.    Family History  's family history includes Emphysema in his father.    Medications and Allergies     Medications  Outpatient Medications Marked as Taking for the 10/19/23 encounter (Office Visit) with Raymon Tyler MD   Medication Sig Dispense Refill    albuterol (PROVENTIL) 2.5 mg /3 mL (0.083 %) nebulizer solution Take by nebulization as needed for Wheezing or Shortness of Breath.      albuterol (PROVENTIL/VENTOLIN HFA) 90 mcg/actuation inhaler 2 puffs as needed      apixaban (ELIQUIS) 2.5 mg Tab Take 2.5 mg by mouth 2 (two) times daily.      BREO ELLIPTA 200-25 mcg/dose DsDv diskus inhaler 1 puff once daily.      calcium carbonate/vitamin D3 (CALTRATE 600 + D ORAL) Take 1 capsule by mouth Daily.      capecitabine (XELODA) 500 MG Tab Take 500 mg by mouth 2 (two) times daily Take 3 tablets twice daily after meals.      divalproex (DEPAKOTE SPRINKLE) 125 mg capsule Take 4  capsules (500 mg total) by mouth once daily. 120 capsule 5    EScitalopram oxalate (LEXAPRO) 10 MG tablet Take 10 mg by mouth.      HYDROcodone-acetaminophen (NORCO) 5-325 mg per tablet Take 1 tablet by mouth every 6 (six) hours as needed.      lipase-protease-amylase 12,000-38,000-60,000 units (CREON) CpDR Take by mouth.      omega 3-dha-epa-fish oil 1,000 mg (120 mg-180 mg) Cap 1 capsule      ondansetron (ZOFRAN-ODT) 4 MG TbDL Take 4 mg by mouth every 8 (eight) hours as needed.      REMERON 15 mg tablet Take 7.5-15 mg by mouth every evening.      sertraline (ZOLOFT) 25 MG tablet Take 1 tablet (25 mg total) by mouth once daily. 90 tablet 1    spironolactone (ALDACTONE) 50 MG tablet Take 50 mg by mouth Daily.         Allergies  Review of patient's allergies indicates:   Allergen Reactions    Pollen extracts     Hay fever and allergy relief Rash       Physical Examination     Vitals:    10/19/23 1228   BP: 114/67   Pulse: 70   Resp: 16   Temp: 97.9 °F (36.6 °C)     Physical Exam  Vitals and nursing note reviewed.   Constitutional:       General: He is not in acute distress.     Appearance: Normal appearance. He is not ill-appearing.   Eyes:      Extraocular Movements: Extraocular movements intact.      Pupils: Pupils are equal, round, and reactive to light.   Cardiovascular:      Rate and Rhythm: Normal rate and regular rhythm.      Heart sounds: Murmur heard.      Systolic murmur is present with a grade of 3/6.   Pulmonary:      Effort: Pulmonary effort is normal.      Breath sounds: Normal breath sounds.   Abdominal:      General: Bowel sounds are normal.      Palpations: Abdomen is soft.   Musculoskeletal:         General: Normal range of motion.      Right lower leg: 3+ Pitting Edema present.      Left lower leg: 3+ Pitting Edema present.   Skin:     Findings: No rash.   Neurological:      General: No focal deficit present.      Mental Status: He is alert and oriented to person, place, and time. Mental status is  at baseline.   Psychiatric:         Mood and Affect: Mood normal.         Behavior: Behavior normal.            Assessment and Plan (including Health Maintenance)      Problem List  Smart Sets  Document Outside HM   :    Plan:     He should continue to heal from the bruised rib on the right lower ribs.     Added omeprazole BID, his symptoms could be from the chemotherapy he is undergoing right now.         Health Maintenance Due   Topic Date Due    Shingles Vaccine (1 of 2) Never done    TETANUS VACCINE  09/17/2012    COVID-19 Vaccine (4 - 2023-24 season) 09/01/2023       Problem List Items Addressed This Visit          Cardiac/Vascular    Primary hypertension (Chronic)    Overview      - Goal blood pressure for most patients is less than 130/85. Both numbers are important. If you have questions about your specific goal blood pressure, please ask at your clinic visits.   - Check blood pressure outside of clinic, record the numbers, and bring the log to all your office visits.   - If you have any chest pain, SOB, or other concerning symptoms, report these immediately, or go to the nearest ER.    - Recommend cardiovascular exercise, at least 3 times per week, for at least 15 minutes.   - Eat a heart healthy diet.            Mixed hyperlipidemia - Primary (Chronic)    Relevant Medications    atorvastatin (LIPITOR) 40 MG tablet    Aortic heart murmur (Chronic)       GI    Gastroesophageal reflux disease (Chronic)    Relevant Medications    omeprazole (PRILOSEC) 40 MG capsule     Other Visit Diagnoses       Encounter for immunization        Relevant Orders    Influenza (FLUAD) - Quadrivalent (Adjuvanted) *Preferred* (65+) (PF) (Completed)    Rib pain on right side        Relevant Orders    X-Ray Ribs 2 View Right (Completed)            Health Maintenance Topics with due status: Not Due       Topic Last Completion Date    Colonoscopy 06/30/2014    Lipid Panel 09/03/2023       Procedures     Future Appointments   Date Time  Provider Department Center   1/18/2024 12:45 PM Raymon Tyler MD [facility] [place]   3/28/2024  2:15 PM James Hughes MD [facility]        Follow up in about 3 months (around 1/19/2024), or if symptoms worsen or fail to improve, for chronic health problems.     Signature:  Raymon Tyler MD  [address] [place] [place] [address]  Phone #: [phone]  Fax #: [phone]    Date of encounter: 10/19/23    There are no Patient Instructions on file for this visit.

## 2023-10-20 ENCOUNTER — TELEPHONE (OUTPATIENT)
Dept: FAMILY MEDICINE | Facility: CLINIC | Age: 78
End: 2023-10-20
Payer: MEDICARE

## 2023-10-20 NOTE — TELEPHONE ENCOUNTER
Spoke with pt wife and she verbalized understanding of taking Omeprazole 40mg BID for 1 week then decrease to omeprazole 40mg daily.

## 2023-10-20 NOTE — TELEPHONE ENCOUNTER
Patients wife called wanting a clarification order for the omeprazole. She said he was taking omeprazole 20mg daily, yesterday you increased him to omeprazole 40mg BID. She wants to make sure you intended for him to take 40mg bid.

## 2023-11-01 ENCOUNTER — DOCUMENT SCAN (OUTPATIENT)
Dept: HOME HEALTH SERVICES | Facility: HOSPITAL | Age: 78
End: 2023-11-01
Payer: MEDICARE

## 2023-11-15 ENCOUNTER — DOCUMENT SCAN (OUTPATIENT)
Dept: HOME HEALTH SERVICES | Facility: HOSPITAL | Age: 78
End: 2023-11-15
Payer: MEDICARE

## 2023-11-29 ENCOUNTER — EXTERNAL HOME HEALTH (OUTPATIENT)
Dept: HOME HEALTH SERVICES | Facility: HOSPITAL | Age: 78
End: 2023-11-29
Payer: MEDICARE

## 2023-12-09 DIAGNOSIS — Z71.89 COMPLEX CARE COORDINATION: ICD-10-CM

## 2023-12-18 PROBLEM — I63.9 ISCHEMIC STROKE OF FRONTAL LOBE: Chronic | Status: RESOLVED | Noted: 2023-09-18 | Resolved: 2023-12-18

## 2023-12-21 RX ORDER — ALBUTEROL SULFATE 0.83 MG/ML
2.5 SOLUTION RESPIRATORY (INHALATION)
Qty: 300 ML | Refills: 5 | Status: SHIPPED | OUTPATIENT
Start: 2023-12-21

## 2023-12-21 NOTE — TELEPHONE ENCOUNTER
----- Message from Elicia Emanuel sent at 12/20/2023 12:59 PM CST -----  Regarding: med refill  Patient needing some of the albuterol sulfate nebs called in to [facility] pharmacy in [facility] call back #[phone]

## 2024-01-04 ENCOUNTER — DOCUMENT SCAN (OUTPATIENT)
Dept: HOME HEALTH SERVICES | Facility: HOSPITAL | Age: 79
End: 2024-01-04
Payer: MEDICARE

## 2024-01-08 ENCOUNTER — OFFICE VISIT (OUTPATIENT)
Dept: FAMILY MEDICINE | Facility: CLINIC | Age: 79
End: 2024-01-08
Payer: MEDICARE

## 2024-01-08 ENCOUNTER — HOSPITAL ENCOUNTER (OUTPATIENT)
Dept: RADIOLOGY | Facility: HOSPITAL | Age: 79
Discharge: HOME | End: 2024-01-08
Attending: FAMILY MEDICINE
Payer: MEDICARE

## 2024-01-08 VITALS
RESPIRATION RATE: 20 BRPM | OXYGEN SATURATION: 98 % | DIASTOLIC BLOOD PRESSURE: 66 MMHG | TEMPERATURE: 98 F | BODY MASS INDEX: 24.6 KG/M2 | SYSTOLIC BLOOD PRESSURE: 110 MMHG | HEIGHT: 72 IN | HEART RATE: 94 BPM | WEIGHT: 181.63 LBS

## 2024-01-08 DIAGNOSIS — R18.8 CIRRHOSIS OF LIVER WITH ASCITES, UNSPECIFIED HEPATIC CIRRHOSIS TYPE: Chronic | ICD-10-CM

## 2024-01-08 DIAGNOSIS — J22 LOWER RESPIRATORY INFECTION: Primary | ICD-10-CM

## 2024-01-08 DIAGNOSIS — F33.9 DEPRESSION, RECURRENT: Chronic | ICD-10-CM

## 2024-01-08 DIAGNOSIS — D61.818 PANCYTOPENIA: Chronic | ICD-10-CM

## 2024-01-08 DIAGNOSIS — J41.1 MUCOPURULENT CHRONIC BRONCHITIS: Chronic | ICD-10-CM

## 2024-01-08 DIAGNOSIS — Z79.899 DRUG-INDUCED IMMUNODEFICIENCY: Chronic | ICD-10-CM

## 2024-01-08 DIAGNOSIS — I50.22 CHRONIC SYSTOLIC (CONGESTIVE) HEART FAILURE: Chronic | ICD-10-CM

## 2024-01-08 DIAGNOSIS — D69.6 THROMBOCYTOPENIA, UNSPECIFIED: Chronic | ICD-10-CM

## 2024-01-08 DIAGNOSIS — D84.821 DRUG-INDUCED IMMUNODEFICIENCY: Chronic | ICD-10-CM

## 2024-01-08 DIAGNOSIS — K74.60 CIRRHOSIS OF LIVER WITH ASCITES, UNSPECIFIED HEPATIC CIRRHOSIS TYPE: Chronic | ICD-10-CM

## 2024-01-08 DIAGNOSIS — F03.918 DEMENTIA WITH BEHAVIORAL DISTURBANCE: Chronic | ICD-10-CM

## 2024-01-08 DIAGNOSIS — K55.069 MESENTERIC VEIN THROMBOSIS: Chronic | ICD-10-CM

## 2024-01-08 DIAGNOSIS — R06.2 WHEEZING: ICD-10-CM

## 2024-01-08 DIAGNOSIS — C25.7 MALIGNANT NEOPLASM OF OTHER PARTS OF PANCREAS: Chronic | ICD-10-CM

## 2024-01-08 DIAGNOSIS — E44.0 MODERATE PROTEIN-CALORIE MALNUTRITION: Chronic | ICD-10-CM

## 2024-01-08 DIAGNOSIS — I48.4 ATYPICAL ATRIAL FLUTTER: Chronic | ICD-10-CM

## 2024-01-08 PROCEDURE — 71046 X-RAY EXAM CHEST 2 VIEWS: CPT | Mod: TC,PN

## 2024-01-08 PROCEDURE — 99214 OFFICE O/P EST MOD 30 MIN: CPT | Mod: ,,, | Performed by: FAMILY MEDICINE

## 2024-01-08 RX ORDER — LEVOFLOXACIN 500 MG/1
500 TABLET, FILM COATED ORAL DAILY
COMMUNITY
Start: 2024-01-03 | End: 2024-03-18

## 2024-01-08 NOTE — PROGRESS NOTES
Raymon Tyler MD    [address] [place] [address]     PATIENT NAME: Tiny Gutierrez  : 1945  DATE: 24  MRN: [id number]      Billing Provider: Raymon Tyler MD  Level of Service: UT OFFICE/OUTPT VISIT, EST, LEVL IV, 30-39 MIN  Patient PCP Information       Provider PCP Type    Raymon Tyler MD General            Reason for Visit / Chief Complaint: Nausea, Fatigue, poor appetite, fluid retention, Fever, Generalized Body Aches, and Shortness of Breath (Symptoms started about a week ago on Thursday. Reports having a treatment on Wednesday prior to getting sick on Thursday. Complain of shortness of breath, nausea, fatigue, poor appetite, fluid retention, fever, and body aches. Dr. Rivera onocolsigifredoist started Levofloxacin on 2024)       Update PCP  Update Chief Complaint         History of Present Illness / Problem Focused Workflow     Tiny Gutierrez presents to the clinic with Nausea, Fatigue, poor appetite, fluid retention, Fever, Generalized Body Aches, and Shortness of Breath (Symptoms started about a week ago on Thursday. Reports having a treatment on Wednesday prior to getting sick on Thursday. Complain of shortness of breath, nausea, fatigue, poor appetite, fluid retention, fever, and body aches. Dr. Rivera onocologist started Levofloxacin on 2024)     He has really struggled over the past couple of years from Pancreatic cancer, surgery, and now chemo. The chemo makes his quite sick, his schedule is treatment once weekly for 2 weeks then skip a week. Then repeat. He has been doing this for 3 months and the plan was to do it for 6 months, if he can tolerate it.     He feels quite weak today. He has little infection fighting ability at this time, he is already on Levaquin for these symptoms, given to him by Oncology    It seems he is really here for support and confirmation that he is doing the right thing at this time.      HPI    Review of Systems     Review of Systems   Constitutional:  Positive for appetite change, fatigue and fever. Negative for activity change and chills.   HENT:  Positive for nasal congestion and sore throat. Negative for ear pain, hearing loss and postnasal drip.    Respiratory:  Positive for cough and shortness of breath. Negative for chest tightness and wheezing.    Cardiovascular:  Negative for chest pain, palpitations, leg swelling and claudication.   Gastrointestinal:  Negative for abdominal pain, change in bowel habit, constipation, diarrhea, nausea and vomiting.   Genitourinary:  Negative for dysuria.   Musculoskeletal:  Negative for arthralgias, back pain, gait problem and myalgias.   Integumentary:  Negative for rash.   Neurological:  Negative for weakness and headaches.   Psychiatric/Behavioral:  Negative for suicidal ideas. The patient is not nervous/anxious.         Medical / Social / Family History     Past Medical History:   Diagnosis Date    Aortic heart murmur     Asthma     Cancer     Deep vein thrombosis     Emphysema/COPD     Hyperlipidemia     Ischemic cardiomyopathy     Mitral regurgitation     Old myocardial infarction 03/2001    Tricuspid regurgitation     Vitamin B 12 deficiency     Vitamin D deficiency        Past Surgical History:   Procedure Laterality Date    CARDIAC CATHETERIZATION      CARDIAC DEFIBRILLATOR PLACEMENT      CHOLECYSTECTOMY      CORONARY ARTERY BYPASS GRAFT      TOTAL KNEE ARTHROPLASTY Bilateral        Social History    reports that he has never smoked. He has never been exposed to tobacco smoke. He has never used smokeless tobacco. He reports that he does not drink alcohol and does not use drugs.    Family History  's family history includes Emphysema in his father.    Medications and Allergies     Medications  Outpatient Medications Marked as Taking for the 1/8/24 encounter (Office Visit) with Raymon Tyler MD   Medication Sig Dispense Refill     albuterol (PROVENTIL) 2.5 mg /3 mL (0.083 %) nebulizer solution Take 3 mLs (2.5 mg total) by nebulization as needed for Wheezing or Shortness of Breath. 300 mL 5    albuterol (PROVENTIL/VENTOLIN HFA) 90 mcg/actuation inhaler 2 puffs as needed      apixaban (ELIQUIS) 2.5 mg Tab Take 2.5 mg by mouth 2 (two) times daily.      atorvastatin (LIPITOR) 40 MG tablet Take 1 tablet (40 mg total) by mouth once daily. For CHOLESTEROL 90 tablet 1    BREO ELLIPTA 200-25 mcg/dose DsDv diskus inhaler 1 puff once daily.      calcium carbonate/vitamin D3 (CALTRATE 600 + D ORAL) Take 1 capsule by mouth Daily.      capecitabine (XELODA) 500 MG Tab Take 500 mg by mouth 2 (two) times daily Take 3 tablets twice daily after meals.      divalproex (DEPAKOTE SPRINKLE) 125 mg capsule Take 4 capsules (500 mg total) by mouth once daily. 120 capsule 5    EScitalopram oxalate (LEXAPRO) 10 MG tablet Take 10 mg by mouth.      HYDROcodone-acetaminophen (NORCO) 5-325 mg per tablet Take 1 tablet by mouth every 6 (six) hours as needed.      levoFLOXacin (LEVAQUIN) 500 MG tablet Take 500 mg by mouth once daily.      lipase-protease-amylase 12,000-38,000-60,000 units (CREON) CpDR Take by mouth.      LORazepam (ATIVAN) 0.5 MG tablet Take 1 tablet (0.5 mg total) by mouth every 12 (twelve) hours as needed (for ANXIETY and AGITATION). 30 tablet 1    omega 3-dha-epa-fish oil 1,000 mg (120 mg-180 mg) Cap 1 capsule      omeprazole (PRILOSEC) 40 MG capsule Take 1 capsule (40 mg total) by mouth 2 (two) times daily before meals. For STOMACH 180 capsule 1    ondansetron (ZOFRAN-ODT) 4 MG TbDL Take 4 mg by mouth every 8 (eight) hours as needed.      REMERON 15 mg tablet Take 7.5-15 mg by mouth every evening.      sertraline (ZOLOFT) 25 MG tablet Take 1 tablet (25 mg total) by mouth once daily. 90 tablet 1    spironolactone (ALDACTONE) 50 MG tablet Take 50 mg by mouth Daily.         Allergies  Review of patient's allergies indicates:   Allergen Reactions    Hay fever  and allergy relief Rash    Pollen extracts Rash       Physical Examination     Vitals:    01/08/24 1442   BP: 110/66   Pulse: 94   Resp: 20   Temp: 98.4 °F (36.9 °C)     Physical Exam  Vitals and nursing note reviewed.   Constitutional:       General: He is not in acute distress.     Appearance: Normal appearance. He is ill-appearing.   HENT:      Right Ear: Tympanic membrane, ear canal and external ear normal.      Left Ear: Tympanic membrane, ear canal and external ear normal.      Nose: Congestion and rhinorrhea present.      Mouth/Throat:      Mouth: Mucous membranes are moist.      Pharynx: Posterior oropharyngeal erythema present. No oropharyngeal exudate.   Eyes:      Extraocular Movements: Extraocular movements intact.      Pupils: Pupils are equal, round, and reactive to light.   Cardiovascular:      Rate and Rhythm: Normal rate and regular rhythm.      Heart sounds: Normal heart sounds.   Pulmonary:      Effort: Pulmonary effort is normal.      Breath sounds: Normal breath sounds.   Abdominal:      General: Bowel sounds are normal.      Palpations: Abdomen is soft.   Musculoskeletal:         General: Normal range of motion.   Lymphadenopathy:      Cervical: No cervical adenopathy.   Skin:     Findings: No rash.   Neurological:      General: No focal deficit present.      Mental Status: He is alert and oriented to person, place, and time. Mental status is at baseline.   Psychiatric:         Mood and Affect: Mood normal.         Behavior: Behavior normal.            Assessment and Plan (including Health Maintenance)      Problem List  Smart Sets  Document Outside HM   :    Plan:         In general, he is not very healthy at this time. The Pancreatic cancer and the treatment has really caused him a lot of pain and misery. No changes made today, he has an appointment with Oncology in 2 days and will keep that appointment.     Best Practice: I have reviewed the Diagnoses listed below. They are stable and  require no changes to treatment today. We will continue to monitor these moving forward.     Health Maintenance Due   Topic Date Due    Shingles Vaccine (1 of 2) Never done    RSV Vaccine (Age 60+ and Pregnant patients) (1 - 1-dose 60+ series) Never done    TETANUS VACCINE  09/17/2012    COVID-19 Vaccine (4 - 2023-24 season) 09/01/2023       Problem List Items Addressed This Visit          Neuro    Dementia with behavioral disturbance (Chronic)       Psychiatric    Depression, recurrent (Chronic)       Pulmonary    Chronic obstructive pulmonary disease (Chronic)       Cardiac/Vascular    Atrial flutter (Chronic)    Chronic systolic (congestive) heart failure       Immunology/Multi System    Drug-induced immunodeficiency       Hematology    Thrombocytopenia, unspecified       Oncology    Malignant neoplasm of other parts of pancreas (Chronic)    Overview     Whipple procedure performed at [facility] in [place], [place]. By Dr. Juancho Yan. Early 2023          Pancytopenia       Endocrine    Moderate protein-calorie malnutrition       GI    Mesenteric vein thrombosis    Cirrhosis of liver with ascites, unspecified hepatic cirrhosis type     Other Visit Diagnoses       Lower respiratory infection    -  Primary    Wheezing        Relevant Orders    X-Ray Chest PA And Lateral (Completed)            Health Maintenance Topics with due status: Not Due       Topic Last Completion Date    Colonoscopy 06/30/2014    Lipid Panel 09/03/2023       Procedures     Future Appointments   Date Time Provider Department Center   1/18/2024 12:45 PM Raymon Tyler MD [facility] [place] [place]   1/26/2024 10:30 AM James Hughes MD [facility]        Follow up in about 3 months (around 4/8/2024), or if symptoms worsen or fail to improve, for chronic health problems.     Signature:  Raymon Tyler MD  [address] [place] [place] [address]  Phone #: [phone]  Fax #:  [phone]    Date of encounter: 1/8/24    There are no Patient Instructions on file for this visit.

## 2024-01-12 ENCOUNTER — TELEPHONE (OUTPATIENT)
Dept: FAMILY MEDICINE | Facility: CLINIC | Age: 79
End: 2024-01-12
Payer: MEDICARE

## 2024-01-12 ENCOUNTER — APPOINTMENT (OUTPATIENT)
Dept: LAB | Facility: CLINIC | Age: 79
End: 2024-01-12
Payer: MEDICARE

## 2024-01-12 DIAGNOSIS — R05.9 COUGH, UNSPECIFIED TYPE: ICD-10-CM

## 2024-01-12 DIAGNOSIS — R11.0 NAUSEA: Primary | ICD-10-CM

## 2024-01-12 LAB
CTP QC/QA: YES
CTP QC/QA: YES
FLUAV AG NPH QL: NEGATIVE
FLUBV AG NPH QL: NEGATIVE
SARS-COV-2 RDRP RESP QL NAA+PROBE: POSITIVE

## 2024-01-12 PROCEDURE — 87804 INFLUENZA ASSAY W/OPTIC: CPT | Mod: QW,RHCUB | Performed by: FAMILY MEDICINE

## 2024-01-12 NOTE — TELEPHONE ENCOUNTER
Liliana from Home Health called wanting to know if its okay if she can swab patient for COVID and flu. She state she will run this at they office. She state patient continues to have nausea, SOB, and cough. Dr. Tyler state it is ok to swab him but wanted to know did he get his treatment on Wednesday. Liliana state his wife said he did not but he got blood work and urine done and everything was okay. Dr. Tyler notified.

## 2024-01-17 ENCOUNTER — TELEPHONE (OUTPATIENT)
Dept: FAMILY MEDICINE | Facility: CLINIC | Age: 79
End: 2024-01-17
Payer: MEDICARE

## 2024-01-17 PROBLEM — T43.621A: Status: ACTIVE | Noted: 2024-01-17

## 2024-01-17 PROBLEM — R18.8 CIRRHOSIS OF LIVER WITH ASCITES, UNSPECIFIED HEPATIC CIRRHOSIS TYPE: Status: ACTIVE | Noted: 2024-01-17

## 2024-01-17 PROBLEM — E44.0 MODERATE PROTEIN-CALORIE MALNUTRITION: Status: ACTIVE | Noted: 2024-01-17

## 2024-01-17 PROBLEM — F22 DELUSIONAL DISORDERS: Status: RESOLVED | Noted: 2024-01-17 | Resolved: 2024-01-17

## 2024-01-17 PROBLEM — K74.60 CIRRHOSIS OF LIVER WITH ASCITES, UNSPECIFIED HEPATIC CIRRHOSIS TYPE: Status: ACTIVE | Noted: 2024-01-17

## 2024-01-17 PROBLEM — D69.6 THROMBOCYTOPENIA, UNSPECIFIED: Status: ACTIVE | Noted: 2024-01-17

## 2024-01-17 PROBLEM — F22 DELUSIONAL DISORDERS: Status: ACTIVE | Noted: 2024-01-17

## 2024-01-17 PROBLEM — D61.818 PANCYTOPENIA: Status: ACTIVE | Noted: 2024-01-17

## 2024-01-17 PROBLEM — T43.621A: Status: RESOLVED | Noted: 2024-01-17 | Resolved: 2024-01-17

## 2024-01-17 PROBLEM — I50.23 ACUTE ON CHRONIC SYSTOLIC (CONGESTIVE) HEART FAILURE: Status: ACTIVE | Noted: 2023-04-10

## 2024-01-17 NOTE — TELEPHONE ENCOUNTER
Wife states he is doing some better. He is not taking doxycycline at this time oncology took him off of antibiotic. He is still running a very low grade fever in the afternoons. She said he must be feeling better today because he is being very hooper today.

## 2024-01-18 ENCOUNTER — OFFICE VISIT (OUTPATIENT)
Dept: FAMILY MEDICINE | Facility: CLINIC | Age: 79
End: 2024-01-18
Payer: MEDICARE

## 2024-01-18 VITALS
TEMPERATURE: 98 F | OXYGEN SATURATION: 95 % | SYSTOLIC BLOOD PRESSURE: 113 MMHG | HEIGHT: 72 IN | WEIGHT: 200.63 LBS | RESPIRATION RATE: 16 BRPM | BODY MASS INDEX: 27.17 KG/M2 | DIASTOLIC BLOOD PRESSURE: 70 MMHG | HEART RATE: 97 BPM

## 2024-01-18 DIAGNOSIS — J41.1 MUCOPURULENT CHRONIC BRONCHITIS: Chronic | ICD-10-CM

## 2024-01-18 DIAGNOSIS — I25.10 CORONARY ARTERY DISEASE INVOLVING NATIVE CORONARY ARTERY OF NATIVE HEART WITHOUT ANGINA PECTORIS: Chronic | ICD-10-CM

## 2024-01-18 DIAGNOSIS — F03.918 DEMENTIA WITH BEHAVIORAL DISTURBANCE: Chronic | ICD-10-CM

## 2024-01-18 DIAGNOSIS — I10 PRIMARY HYPERTENSION: Primary | Chronic | ICD-10-CM

## 2024-01-18 DIAGNOSIS — F33.9 DEPRESSION, RECURRENT: Chronic | ICD-10-CM

## 2024-01-18 PROCEDURE — 99213 OFFICE O/P EST LOW 20 MIN: CPT | Mod: ,,, | Performed by: FAMILY MEDICINE

## 2024-01-18 NOTE — PROGRESS NOTES
Raymon Tyler MD    [address] [place] [address]     PATIENT NAME: Tiny Gutierrez  : 1945  DATE: 24  MRN: [id number]      Billing Provider: Raymon Tyler MD  Level of Service: [place] OFFICE/OUTPT VISIT, EST, LEVL III, 20-29 MIN  Patient PCP Information       Provider PCP Type    Raymon Tyler MD General            Reason for Visit / Chief Complaint: Follow-up (On COVID. He has been sick for 3 weeks. He is very weak. ), ribcage pain (Left lower ribcage area pain when he coughs), and Wheezing (Mostly at night. Coughing up thick white mucus)       Update PCP  Update Chief Complaint         History of Present Illness / Problem Focused Workflow     Tiny Gutierrez presents to the clinic with Follow-up (On COVID. He has been sick for 3 weeks. He is very weak. ), ribcage pain (Left lower ribcage area pain when he coughs), and Wheezing (Mostly at night. Coughing up thick white mucus)     HPI    Review of Systems     Review of Systems   Constitutional:  Positive for appetite change and fatigue. Negative for activity change, chills and fever.   HENT:  Negative for nasal congestion, ear pain, hearing loss, postnasal drip and sore throat.    Respiratory:  Positive for cough, shortness of breath and wheezing. Negative for chest tightness.    Cardiovascular:  Negative for chest pain, palpitations, leg swelling and claudication.   Gastrointestinal:  Negative for abdominal pain, change in bowel habit, constipation, diarrhea, nausea and vomiting.   Genitourinary:  Negative for dysuria.   Musculoskeletal:  Negative for arthralgias, back pain, gait problem and myalgias.   Integumentary:  Negative for rash.   Neurological:  Positive for weakness. Negative for headaches.   Psychiatric/Behavioral:  Negative for suicidal ideas. The patient is not nervous/anxious.         Medical / Social / Family History     Past Medical History:   Diagnosis Date    Aortic  heart murmur     Asthma     Cancer     Deep vein thrombosis     Emphysema/COPD     Hyperlipidemia     Ischemic cardiomyopathy     Mitral regurgitation     Old myocardial infarction 03/2001    Tricuspid regurgitation     Vitamin B 12 deficiency     Vitamin D deficiency        Past Surgical History:   Procedure Laterality Date    CARDIAC CATHETERIZATION      CARDIAC DEFIBRILLATOR PLACEMENT      CHOLECYSTECTOMY      CORONARY ARTERY BYPASS GRAFT      TOTAL KNEE ARTHROPLASTY Bilateral        Social History    reports that he has never smoked. He has never been exposed to tobacco smoke. He has never used smokeless tobacco. He reports that he does not drink alcohol and does not use drugs.    Family History  's family history includes Emphysema in his father.    Medications and Allergies     Medications  Outpatient Medications Marked as Taking for the 1/18/24 encounter (Office Visit) with Raymon Tyler MD   Medication Sig Dispense Refill    albuterol (PROVENTIL) 2.5 mg /3 mL (0.083 %) nebulizer solution Take 3 mLs (2.5 mg total) by nebulization as needed for Wheezing or Shortness of Breath. 300 mL 5    albuterol (PROVENTIL/VENTOLIN HFA) 90 mcg/actuation inhaler 2 puffs as needed      apixaban (ELIQUIS) 2.5 mg Tab Take 2.5 mg by mouth 2 (two) times daily.      atorvastatin (LIPITOR) 40 MG tablet Take 1 tablet (40 mg total) by mouth once daily. For CHOLESTEROL 90 tablet 1    BREO ELLIPTA 200-25 mcg/dose DsDv diskus inhaler 1 puff once daily.      calcium carbonate/vitamin D3 (CALTRATE 600 + D ORAL) Take 1 capsule by mouth Daily.      capecitabine (XELODA) 500 MG Tab Take 500 mg by mouth 2 (two) times daily Take 3 tablets twice daily after meals.      divalproex (DEPAKOTE SPRINKLE) 125 mg capsule Take 4 capsules (500 mg total) by mouth once daily. 120 capsule 5    EScitalopram oxalate (LEXAPRO) 10 MG tablet Take 10 mg by mouth.      HYDROcodone-acetaminophen (NORCO) 5-325 mg per tablet Take 1 tablet by mouth every  6 (six) hours as needed.      levoFLOXacin (LEVAQUIN) 500 MG tablet Take 500 mg by mouth once daily.      lipase-protease-amylase 12,000-38,000-60,000 units (CREON) CpDR Take 1 capsule by mouth after meals as needed.      omega 3-dha-epa-fish oil 1,000 mg (120 mg-180 mg) Cap 1 capsule      omeprazole (PRILOSEC) 40 MG capsule Take 1 capsule (40 mg total) by mouth 2 (two) times daily before meals. For STOMACH 180 capsule 1    ondansetron (ZOFRAN-ODT) 4 MG TbDL Take 4 mg by mouth every 8 (eight) hours as needed.      REMERON 15 mg tablet Take 7.5-15 mg by mouth every evening.      sertraline (ZOLOFT) 25 MG tablet Take 1 tablet (25 mg total) by mouth once daily. 90 tablet 1    spironolactone (ALDACTONE) 50 MG tablet Take 50 mg by mouth Daily.         Allergies  Review of patient's allergies indicates:   Allergen Reactions    Hay fever and allergy relief Rash    Pollen extracts Rash       Physical Examination     Vitals:    01/18/24 1253   BP: 113/70   Pulse: 97   Resp: 16   Temp: 98.1 °F (36.7 °C)     Physical Exam  Vitals and nursing note reviewed.   Constitutional:       General: He is not in acute distress.     Appearance: Normal appearance. He is ill-appearing.   Eyes:      Extraocular Movements: Extraocular movements intact.      Pupils: Pupils are equal, round, and reactive to light.   Cardiovascular:      Rate and Rhythm: Normal rate and regular rhythm.      Heart sounds: Normal heart sounds.   Pulmonary:      Effort: Pulmonary effort is normal.      Breath sounds: Wheezing present.   Abdominal:      General: Bowel sounds are normal.      Palpations: Abdomen is soft.   Musculoskeletal:         General: Normal range of motion.   Skin:     Findings: No rash.   Neurological:      General: No focal deficit present.      Mental Status: He is alert and oriented to person, place, and time. Mental status is at baseline.   Psychiatric:         Mood and Affect: Mood normal.         Behavior: Behavior normal.             Assessment and Plan (including Health Maintenance)      Problem List  Smart Sets  Document Outside HM   :    Plan:     Today's visit was more of a visit to provide support, answer some questions, and let them know we love them. He did not need much today. The visit was scheduled months ago and he has been in several times since then.     No changes in his treatment today    He is recovering well from possible COVID, wife said they have gotten 3 negative home covid tests, and only the one positive that was done by Home Health. He has not felt any different than his regular, feels terrible, way of feeling.     Health Maintenance Due   Topic Date Due    Shingles Vaccine (1 of 2) Never done    RSV Vaccine (Age 60+ and Pregnant patients) (1 - 1-dose 60+ series) Never done    TETANUS VACCINE  09/17/2012    COVID-19 Vaccine (4 - 2023-24 season) 09/01/2023       Problem List Items Addressed This Visit          Neuro    Dementia with behavioral disturbance (Chronic)       Psychiatric    Depression, recurrent (Chronic)       Pulmonary    Mucopurulent chronic bronchitis (Chronic)       Cardiac/Vascular    Primary hypertension - Primary (Chronic)    Overview      - Goal blood pressure for most patients is less than 130/85. Both numbers are important. If you have questions about your specific goal blood pressure, please ask at your clinic visits.   - Check blood pressure outside of clinic, record the numbers, and bring the log to all your office visits.   - If you have any chest pain, SOB, or other concerning symptoms, report these immediately, or go to the nearest ER.    - Recommend cardiovascular exercise, at least 3 times per week, for at least 15 minutes.   - Eat a heart healthy diet.            Coronary artery disease involving native heart without angina pectoris (Chronic)       Health Maintenance Topics with due status: Not Due       Topic Last Completion Date    Colonoscopy 06/30/2014    Lipid Panel 09/03/2023        Procedures     Future Appointments   Date Time Provider Department Center   1/26/2024 10:30 AM James Hughes MD [facility] CARD [facility]        Follow up if symptoms worsen or fail to improve.     Signature:  Raymon Tyler MD  [address] [place] [address]  Phone #: [phone]  Fax #: [phone]    Date of encounter: 1/18/24    There are no Patient Instructions on file for this visit.

## 2024-01-25 ENCOUNTER — TELEPHONE (OUTPATIENT)
Dept: FAMILY MEDICINE | Facility: CLINIC | Age: 79
End: 2024-01-25
Payer: MEDICARE

## 2024-01-25 ENCOUNTER — EXTERNAL HOME HEALTH (OUTPATIENT)
Dept: HOME HEALTH SERVICES | Facility: HOSPITAL | Age: 79
End: 2024-01-25
Payer: MEDICARE

## 2024-01-25 DIAGNOSIS — R05.9 COUGH, UNSPECIFIED TYPE: Primary | ICD-10-CM

## 2024-01-25 DIAGNOSIS — Z95.810 PRESENCE OF DOUBLE CHAMBER AUTOMATIC CARDIOVERTER/DEFIBRILLATOR (AICD): Primary | ICD-10-CM

## 2024-01-25 RX ORDER — BENZONATATE 100 MG/1
100 CAPSULE ORAL 3 TIMES DAILY PRN
Qty: 60 CAPSULE | Refills: 2 | Status: SHIPPED | OUTPATIENT
Start: 2024-01-25 | End: 2024-01-30

## 2024-01-25 NOTE — TELEPHONE ENCOUNTER
Jocelynn maldonado/[facility] called, patient is requesting cough med for non productive cough to be sent to [facility].

## 2024-01-26 ENCOUNTER — OFFICE VISIT (OUTPATIENT)
Dept: CARDIOLOGY | Facility: CLINIC | Age: 79
End: 2024-01-26
Payer: MEDICARE

## 2024-01-26 ENCOUNTER — HOSPITAL ENCOUNTER (OUTPATIENT)
Dept: CARDIOLOGY | Facility: HOSPITAL | Age: 79
Discharge: HOME | End: 2024-01-26
Attending: INTERNAL MEDICINE
Payer: MEDICARE

## 2024-01-26 VITALS
HEART RATE: 70 BPM | BODY MASS INDEX: 26.45 KG/M2 | OXYGEN SATURATION: 98 % | SYSTOLIC BLOOD PRESSURE: 102 MMHG | DIASTOLIC BLOOD PRESSURE: 64 MMHG | WEIGHT: 195 LBS

## 2024-01-26 DIAGNOSIS — C25.9 MALIGNANT NEOPLASM OF PANCREAS, UNSPECIFIED LOCATION OF MALIGNANCY: Chronic | ICD-10-CM

## 2024-01-26 DIAGNOSIS — C61 PROSTATE CANCER: Chronic | ICD-10-CM

## 2024-01-26 DIAGNOSIS — I25.5 ISCHEMIC CARDIOMYOPATHY: Chronic | ICD-10-CM

## 2024-01-26 DIAGNOSIS — I10 PRIMARY HYPERTENSION: Chronic | ICD-10-CM

## 2024-01-26 DIAGNOSIS — Z95.810 PRESENCE OF DOUBLE CHAMBER AUTOMATIC CARDIOVERTER/DEFIBRILLATOR (AICD): ICD-10-CM

## 2024-01-26 DIAGNOSIS — I25.10 CORONARY ARTERY DISEASE INVOLVING NATIVE CORONARY ARTERY OF NATIVE HEART WITHOUT ANGINA PECTORIS: Primary | Chronic | ICD-10-CM

## 2024-01-26 DIAGNOSIS — I50.22 CHRONIC SYSTOLIC (CONGESTIVE) HEART FAILURE: Chronic | ICD-10-CM

## 2024-01-26 PROCEDURE — 99214 OFFICE O/P EST MOD 30 MIN: CPT | Mod: PBBFAC | Performed by: INTERNAL MEDICINE

## 2024-01-26 PROCEDURE — 93283 PRGRMG EVAL IMPLANTABLE DFB: CPT | Mod: 26,,, | Performed by: INTERNAL MEDICINE

## 2024-01-26 PROCEDURE — 93283 PRGRMG EVAL IMPLANTABLE DFB: CPT

## 2024-01-26 PROCEDURE — 99214 OFFICE O/P EST MOD 30 MIN: CPT | Mod: S$PBB,,, | Performed by: INTERNAL MEDICINE

## 2024-01-26 RX ORDER — PANCRELIPASE 24000; 76000; 120000 [USP'U]/1; [USP'U]/1; [USP'U]/1
1 CAPSULE, DELAYED RELEASE PELLETS ORAL 3 TIMES DAILY
COMMUNITY
Start: 2024-01-15

## 2024-01-30 NOTE — PROGRESS NOTES
"PCP: Raymon Tyler MD    Referring Provider:     Subjective:   Tiny Gutierrez is a 78 y.o. male with hx of CAD s/p CABG (2007), iCMP s/p ICD, HTN, HLD, CKD, COPD, prostate cancer, pancreatic cancer, LV thrombus ( 7/18/2021 echo, [facility] [place], [place]) and atrial flutter,  who presents for  6 month follow up.       9/23/23--Tiny Gutierrez is a 78 y.o. male with hx of CAD s/p CABG (2007), iCMP s/p ICD, HTN, HLD, CKD, COPD, prostate cancer, pancreatic cancer, LV thrombus ( 7/18/2021 echo, [facility] [place]) and atrial flutter,  who presents for follow up after last hospitalization.      8/2/23--Tiny Gutierrez is a 78 y.o. male with hx of CAD s/p CABG (2007), iCMP s/p ICD, HTN, HLD, asthma, CKD, COPD, prostate cancer, pancreatic cancer, LV thrombus ( 7/18/2021 echo, [facility] [place]) and atrial flutter,  who presents for return check requested for severe BLE edema. He denies chest pain , orthopna or PND but has chronic, stable CONRAD. The lower ext edema began approx 3 weeks ago after starting a "cancer med". This med has been stopped by his oncologist and lasix dose increased to 40 mg with the addition of aldactone 50 mg po daily with improvement but not resolution of the edema. His serum albumin is also 2.1 which is a contributing factor. An echocardiogram was done earlier today and results are pending.         Fhx:  Family History   Problem Relation Age of Onset    Emphysema Father      Shx:   Social History     Socioeconomic History    Marital status:    Tobacco Use    Smoking status: Never     Passive exposure: Never    Smokeless tobacco: Never   Substance and Sexual Activity    Alcohol use: Never    Drug use: Never    Sexual activity: Yes     Partners: Female       EKG   9/26/23--NSR, rightward axis, 85 bpm     Echo    8/30/23--EF 40%.  Focal apical akinesis.  LV mildly dilated. Left atrium moderately dilated.  Mild AS.  Mild MR.     8/2/23--Interpretation Summary    Left Ventricle: regional wall motion " abnormalities present. There is mildly reduced systolic function. Ejection fraction by visual approximation is 40%. There is diastolic dysfunction.    Left Atrium: Left atrium is mildly dilated.    Right Atrium: Right atrium is mildly dilated. Catheter present in the right atrium.    Aortic Valve: There is moderate aortic valve sclerosis.    Mitral Valve: There is mild to moderate regurgitation.    Tricuspid Valve: There is mild transvalvular regurgitation.      2/17/23--Interpretation Summary  · Mild right ventricular enlargement with normal right ventricular systolic function.  · Moderate mitral regurgitation.  · Mild to moderate tricuspid regurgitation.  · Normal central venous pressure (3 mmHg).  · The estimated PA systolic pressure is 28 mmHg.  · Left ventricular diastolic dysfunction.  · Mildly decreased systolic function.  · The estimated ejection fraction is 40%.  · There are segmental left ventricular wall motion abnormalities.  · Mild left atrial enlargement.    LHC 3/20/15--3 V CAD with patent grafts to the LAD and obtuse marginal.  Moderate LV systolic dysfunction, LVEF 35-40%.  Normal LV hemodynamics.      3/28/07--3 V CAD.  Segmental wall motion abnormality with severely reduced LVSF, EF 20%.      CABG:     3/29/07--LIMA to LAD, SVG to RCA and OM.     ICD:     10/3/19--R/R end of life ICD with new MRI compatible ICD.      8/8/13--R/ R end of life ICD with new dual chamber ICD.     1/15/09--implantation of dual chamber PPM ICD.  .        Lab Results   Component Value Date     08/02/2023    K 4.9 08/02/2023     08/02/2023    CO2 27 08/02/2023    BUN 8 08/02/2023    CREATININE 1.17 08/02/2023    CALCIUM 8.9 08/02/2023    ANIONGAP 12 08/02/2023    EGFRNONAA 67 10/27/2021       Lab Results   Component Value Date    CHOL 185 10/27/2021    CHOL 119 08/18/2020     Lab Results   Component Value Date    HDL 54 10/27/2021    HDL 44 08/18/2020     Lab Results   Component Value Date    LDLCALC 112  10/27/2021    LDLCALC 57 08/18/2020     Lab Results   Component Value Date    TRIG 97 10/27/2021    TRIG 90 08/18/2020     Lab Results   Component Value Date    CHOLHDL 3.4 10/27/2021    CHOLHDL 2.7 08/18/2020       Lab Results   Component Value Date    WBC 5.09 07/10/2023    HGB 11.1 (L) 07/10/2023    HCT 33.7 (L) 07/10/2023    MCV 96.0 07/10/2023     (L) 07/10/2023           Current Outpatient Medications:     albuterol (PROVENTIL) 2.5 mg /3 mL (0.083 %) nebulizer solution, Take 3 mLs (2.5 mg total) by nebulization as needed for Wheezing or Shortness of Breath., Disp: 300 mL, Rfl: 5    albuterol (PROVENTIL/VENTOLIN HFA) 90 mcg/actuation inhaler, 2 puffs as needed, Disp: , Rfl:     apixaban (ELIQUIS) 2.5 mg Tab, Take 2.5 mg by mouth 2 (two) times daily., Disp: , Rfl:     atorvastatin (LIPITOR) 40 MG tablet, Take 1 tablet (40 mg total) by mouth once daily. For CHOLESTEROL, Disp: 90 tablet, Rfl: 1    BREO ELLIPTA 200-25 mcg/dose DsDv diskus inhaler, 1 puff once daily., Disp: , Rfl:     calcium carbonate/vitamin D3 (CALTRATE 600 + D ORAL), Take 1 capsule by mouth Daily., Disp: , Rfl:     CREON 24,000-76,000 -120,000 unit capsule, Take 1 capsule by mouth 3 (three) times daily., Disp: , Rfl:     divalproex (DEPAKOTE SPRINKLE) 125 mg capsule, Take 4 capsules (500 mg total) by mouth once daily., Disp: 120 capsule, Rfl: 5    lipase-protease-amylase 12,000-38,000-60,000 units (CREON) CpDR, Take 1 capsule by mouth after meals as needed., Disp: , Rfl:     LORazepam (ATIVAN) 0.5 MG tablet, Take 1 tablet (0.5 mg total) by mouth every 12 (twelve) hours as needed (for ANXIETY and AGITATION)., Disp: 30 tablet, Rfl: 1    omeprazole (PRILOSEC) 40 MG capsule, Take 1 capsule (40 mg total) by mouth 2 (two) times daily before meals. For STOMACH, Disp: 180 capsule, Rfl: 1    ondansetron (ZOFRAN-ODT) 4 MG TbDL, Take 4 mg by mouth every 8 (eight) hours as needed., Disp: , Rfl:     sertraline (ZOLOFT) 25 MG tablet, Take 1 tablet (25  mg total) by mouth once daily., Disp: 90 tablet, Rfl: 1    spironolactone (ALDACTONE) 50 MG tablet, Take 50 mg by mouth Daily., Disp: , Rfl:     capecitabine (XELODA) 500 MG Tab, Take 500 mg by mouth 2 (two) times daily Take 3 tablets twice daily after meals., Disp: , Rfl:     EScitalopram oxalate (LEXAPRO) 10 MG tablet, Take 10 mg by mouth., Disp: , Rfl:     HYDROcodone-acetaminophen (NORCO) 5-325 mg per tablet, Take 1 tablet by mouth every 6 (six) hours as needed., Disp: , Rfl:     levoFLOXacin (LEVAQUIN) 500 MG tablet, Take 500 mg by mouth once daily., Disp: , Rfl:     omega 3-dha-epa-fish oil 1,000 mg (120 mg-180 mg) Cap, 1 capsule, Disp: , Rfl:     REMERON 15 mg tablet, Take 7.5-15 mg by mouth every evening., Disp: , Rfl:   Medications reconciled by pt's list.     Review of Systems   Respiratory:  Positive for shortness of breath.    Cardiovascular:  Positive for palpitations and leg swelling. Negative for chest pain.   Neurological:  Positive for dizziness. Negative for loss of consciousness.           Objective:   /64 (BP Location: Left arm, Patient Position: Sitting)   Pulse 70   Wt 88.5 kg (195 lb)   SpO2 98%   BMI 26.45 kg/m²     Physical Exam  Vitals reviewed.   Constitutional:       General: He is not in acute distress.     Appearance: Normal appearance.   HENT:      Head: Normocephalic and atraumatic.   Neck:      Vascular: No carotid bruit or JVD.   Cardiovascular:      Rate and Rhythm: Normal rate and regular rhythm.      Pulses: Normal pulses.           Radial pulses are 2+ on the right side and 2+ on the left side.      Heart sounds: Murmur heard.      Comments: II/VI HSM apex    Pulmonary:      Effort: Pulmonary effort is normal.      Breath sounds: Normal breath sounds.   Chest:      Comments: Well healed ICD site to left upper chest wall  Musculoskeletal:      Right lower leg: No edema.      Left lower leg: No edema.      Comments: 1+ NP BLE edema to the knees   Skin:     General: Skin  is warm and dry.   Neurological:      General: No focal deficit present.      Mental Status: He is alert.           Assessment:     1. Coronary artery disease involving native coronary artery of native heart without angina pectoris      s/p anterior MI 3/01      2. Primary hypertension        3. Chronic systolic (congestive) heart failure        4. Ischemic cardiomyopathy      with CHF, NYHA class III      5. Prostate cancer        6. Malignant neoplasm of pancreas, unspecified location of malignancy              Plan:   Continue current medications  Continue ICD monitoring as scheduled.  F/u in 6 months.

## 2024-01-31 PROBLEM — C25.9 MALIGNANT NEOPLASM OF PANCREAS: Status: ACTIVE | Noted: 2023-04-10

## 2024-02-05 ENCOUNTER — DOCUMENT SCAN (OUTPATIENT)
Dept: HOME HEALTH SERVICES | Facility: HOSPITAL | Age: 79
End: 2024-02-05
Payer: MEDICARE

## 2024-02-06 ENCOUNTER — TELEPHONE (OUTPATIENT)
Dept: FAMILY MEDICINE | Facility: CLINIC | Age: 79
End: 2024-02-06
Payer: MEDICARE

## 2024-02-06 NOTE — TELEPHONE ENCOUNTER
Aparna with [facility] called stating patient is getting weaker. She wants to know if they can do a PT consult. If so we can call her back at [phone]

## 2024-02-22 ENCOUNTER — DOCUMENT SCAN (OUTPATIENT)
Dept: HOME HEALTH SERVICES | Facility: HOSPITAL | Age: 79
End: 2024-02-22
Payer: MEDICARE

## 2024-02-23 ENCOUNTER — DOCUMENT SCAN (OUTPATIENT)
Dept: HOME HEALTH SERVICES | Facility: HOSPITAL | Age: 79
End: 2024-02-23
Payer: MEDICARE

## 2024-03-07 ENCOUNTER — TELEPHONE (OUTPATIENT)
Dept: CARDIOLOGY | Facility: CLINIC | Age: 79
End: 2024-03-07
Payer: MEDICARE

## 2024-03-07 NOTE — TELEPHONE ENCOUNTER
Spoke with wife and suggested she bring pt to ER due to weight gain of 5lbs and abdomen swelling. I told her they could get the fluid off better and safer there. She states he goes to [place] on Monday, and they have had to do a pericentesis before.She just did not know if we could change meds. I told her without labs, changing meds could not be done safely and to safely remove fluid, pt needed to be evaluated in the ER. Pt wife verbalized understanding.

## 2024-03-08 ENCOUNTER — TELEPHONE (OUTPATIENT)
Dept: CARDIOLOGY | Facility: CLINIC | Age: 79
End: 2024-03-08
Payer: MEDICARE

## 2024-03-08 NOTE — TELEPHONE ENCOUNTER
Wife called and reports wt gain of 5-10 lbs over last 2 days and SOB.  Discussed with Dr. Hughes-  will increase Lasix 40 mg to bid x 3 days to see if this will work. Instructed if the Lasix po does not work and his SOB worsens, he may have to go the the ER for IV Lasix.  Wife voiced understanding.

## 2024-03-18 ENCOUNTER — OFFICE VISIT (OUTPATIENT)
Dept: FAMILY MEDICINE | Facility: CLINIC | Age: 79
End: 2024-03-18
Payer: MEDICARE

## 2024-03-18 ENCOUNTER — HOSPITAL ENCOUNTER (OUTPATIENT)
Dept: RADIOLOGY | Facility: HOSPITAL | Age: 79
Discharge: HOME | End: 2024-03-18
Attending: FAMILY MEDICINE
Payer: MEDICARE

## 2024-03-18 VITALS
WEIGHT: 213 LBS | TEMPERATURE: 98 F | OXYGEN SATURATION: 99 % | SYSTOLIC BLOOD PRESSURE: 115 MMHG | RESPIRATION RATE: 16 BRPM | HEIGHT: 72 IN | HEART RATE: 84 BPM | BODY MASS INDEX: 28.85 KG/M2 | DIASTOLIC BLOOD PRESSURE: 70 MMHG

## 2024-03-18 DIAGNOSIS — R18.8 CIRRHOSIS OF LIVER WITH ASCITES, UNSPECIFIED HEPATIC CIRRHOSIS TYPE: Chronic | ICD-10-CM

## 2024-03-18 DIAGNOSIS — J41.1 MUCOPURULENT CHRONIC BRONCHITIS: Chronic | ICD-10-CM

## 2024-03-18 DIAGNOSIS — E44.0 MODERATE PROTEIN-CALORIE MALNUTRITION: Chronic | ICD-10-CM

## 2024-03-18 DIAGNOSIS — K86.89 PANCREATIC MASS: Chronic | ICD-10-CM

## 2024-03-18 DIAGNOSIS — I25.10 CORONARY ARTERY DISEASE INVOLVING NATIVE CORONARY ARTERY OF NATIVE HEART WITHOUT ANGINA PECTORIS: Chronic | ICD-10-CM

## 2024-03-18 DIAGNOSIS — R60.1 ANASARCA: Chronic | ICD-10-CM

## 2024-03-18 DIAGNOSIS — I50.22 CHRONIC SYSTOLIC (CONGESTIVE) HEART FAILURE: Primary | Chronic | ICD-10-CM

## 2024-03-18 DIAGNOSIS — K74.60 CIRRHOSIS OF LIVER WITH ASCITES, UNSPECIFIED HEPATIC CIRRHOSIS TYPE: Chronic | ICD-10-CM

## 2024-03-18 DIAGNOSIS — R14.0 ABDOMINAL DISTENTION: ICD-10-CM

## 2024-03-18 DIAGNOSIS — M54.50 CHRONIC BILATERAL LOW BACK PAIN WITHOUT SCIATICA: Chronic | ICD-10-CM

## 2024-03-18 DIAGNOSIS — I48.4 ATYPICAL ATRIAL FLUTTER: Chronic | ICD-10-CM

## 2024-03-18 DIAGNOSIS — R14.0 ABDOMINAL DISTENTION: Chronic | ICD-10-CM

## 2024-03-18 DIAGNOSIS — G89.29 CHRONIC BILATERAL LOW BACK PAIN WITHOUT SCIATICA: Chronic | ICD-10-CM

## 2024-03-18 DIAGNOSIS — R60.0 BILATERAL LOWER EXTREMITY EDEMA: Chronic | ICD-10-CM

## 2024-03-18 PROBLEM — D84.821 DRUG-INDUCED IMMUNODEFICIENCY: Chronic | Status: ACTIVE | Noted: 2023-07-10

## 2024-03-18 PROBLEM — D69.6 THROMBOCYTOPENIA, UNSPECIFIED: Chronic | Status: ACTIVE | Noted: 2024-01-17

## 2024-03-18 PROBLEM — D61.818 PANCYTOPENIA: Chronic | Status: ACTIVE | Noted: 2024-01-17

## 2024-03-18 PROBLEM — Z79.899 DRUG-INDUCED IMMUNODEFICIENCY: Chronic | Status: ACTIVE | Noted: 2023-07-10

## 2024-03-18 PROBLEM — C25.9 MALIGNANT NEOPLASM OF PANCREAS: Chronic | Status: ACTIVE | Noted: 2023-04-10

## 2024-03-18 LAB
ALBUMIN SERPL BCP-MCNC: 1.9 G/DL (ref 3.5–5)
ALBUMIN/GLOB SERPL: 0.5 {RATIO}
ALP SERPL-CCNC: 113 U/L (ref 45–115)
ALT SERPL W P-5'-P-CCNC: 19 U/L (ref 16–61)
ANION GAP SERPL CALCULATED.3IONS-SCNC: 6 MMOL/L (ref 7–16)
AST SERPL W P-5'-P-CCNC: 40 U/L (ref 15–37)
BASOPHILS # BLD AUTO: 0.04 K/UL (ref 0–0.2)
BASOPHILS NFR BLD AUTO: 0.7 % (ref 0–1)
BILIRUB SERPL-MCNC: 0.4 MG/DL (ref ?–1.2)
BILIRUB UR QL STRIP: NEGATIVE
BUN SERPL-MCNC: 21 MG/DL (ref 7–18)
BUN/CREAT SERPL: 19 (ref 6–20)
CALCIUM SERPL-MCNC: 8.3 MG/DL (ref 8.5–10.1)
CHLORIDE SERPL-SCNC: 106 MMOL/L (ref 98–107)
CLARITY UR: CLEAR
CO2 SERPL-SCNC: 32 MMOL/L (ref 21–32)
COLOR UR: NORMAL
CREAT SERPL-MCNC: 1.13 MG/DL (ref 0.7–1.3)
DIFFERENTIAL METHOD BLD: ABNORMAL
EGFR (NO RACE VARIABLE) (RUSH/TITUS): 66 ML/MIN/1.73M2
EOSINOPHIL # BLD AUTO: 0.14 K/UL (ref 0–0.5)
EOSINOPHIL NFR BLD AUTO: 2.4 % (ref 1–4)
ERYTHROCYTE [DISTWIDTH] IN BLOOD BY AUTOMATED COUNT: 16.8 % (ref 11.5–14.5)
GLOBULIN SER-MCNC: 3.7 G/DL (ref 2–4)
GLUCOSE SERPL-MCNC: 113 MG/DL (ref 74–106)
GLUCOSE UR STRIP-MCNC: NORMAL MG/DL
HCT VFR BLD AUTO: 29.2 % (ref 40–54)
HGB BLD-MCNC: 10 G/DL (ref 13.5–18)
IMM GRANULOCYTES # BLD AUTO: 0.01 K/UL (ref 0–0.04)
IMM GRANULOCYTES NFR BLD: 0.2 % (ref 0–0.4)
KETONES UR STRIP-SCNC: NEGATIVE MG/DL
LEUKOCYTE ESTERASE UR QL STRIP: NEGATIVE
LYMPHOCYTES # BLD AUTO: 1.39 K/UL (ref 1–4.8)
LYMPHOCYTES NFR BLD AUTO: 24.2 % (ref 27–41)
MCH RBC QN AUTO: 34.5 PG (ref 27–31)
MCHC RBC AUTO-ENTMCNC: 34.2 G/DL (ref 32–36)
MCV RBC AUTO: 100.7 FL (ref 80–96)
MONOCYTES # BLD AUTO: 0.58 K/UL (ref 0–0.8)
MONOCYTES NFR BLD AUTO: 10.1 % (ref 2–6)
MPC BLD CALC-MCNC: 10.3 FL (ref 9.4–12.4)
NEUTROPHILS # BLD AUTO: 3.59 K/UL (ref 1.8–7.7)
NEUTROPHILS NFR BLD AUTO: 62.4 % (ref 53–65)
NITRITE UR QL STRIP: NEGATIVE
NRBC # BLD AUTO: 0 X10E3/UL
NRBC, AUTO (.00): 0 %
NT-PROBNP SERPL-MCNC: 4027 PG/ML (ref 1–450)
PH UR STRIP: 5 PH UNITS
PLATELET # BLD AUTO: 167 K/UL (ref 150–400)
POTASSIUM SERPL-SCNC: 4.1 MMOL/L (ref 3.5–5.1)
PROT SERPL-MCNC: 5.6 G/DL (ref 6.4–8.2)
PROT UR QL STRIP: NEGATIVE
RBC # BLD AUTO: 2.9 M/UL (ref 4.6–6.2)
RBC # UR STRIP: NEGATIVE /UL
SODIUM SERPL-SCNC: 140 MMOL/L (ref 136–145)
SP GR UR STRIP: 1.01
UROBILINOGEN UR STRIP-ACNC: NORMAL MG/DL
WBC # BLD AUTO: 5.75 K/UL (ref 4.5–11)

## 2024-03-18 PROCEDURE — 80053 COMPREHEN METABOLIC PANEL: CPT | Mod: ,,, | Performed by: CLINICAL MEDICAL LABORATORY

## 2024-03-18 PROCEDURE — 85025 COMPLETE CBC W/AUTO DIFF WBC: CPT | Mod: ,,, | Performed by: CLINICAL MEDICAL LABORATORY

## 2024-03-18 PROCEDURE — 99214 OFFICE O/P EST MOD 30 MIN: CPT | Mod: ,,, | Performed by: FAMILY MEDICINE

## 2024-03-18 PROCEDURE — 83880 ASSAY OF NATRIURETIC PEPTIDE: CPT | Mod: ,,, | Performed by: CLINICAL MEDICAL LABORATORY

## 2024-03-18 PROCEDURE — 74019 RADEX ABDOMEN 2 VIEWS: CPT | Mod: TC,PN

## 2024-03-18 PROCEDURE — 96372 THER/PROPH/DIAG INJ SC/IM: CPT | Mod: ,,, | Performed by: FAMILY MEDICINE

## 2024-03-18 PROCEDURE — 81003 URINALYSIS AUTO W/O SCOPE: CPT | Mod: QW,,, | Performed by: CLINICAL MEDICAL LABORATORY

## 2024-03-18 RX ORDER — ONDANSETRON 4 MG/1
4 TABLET, ORALLY DISINTEGRATING ORAL EVERY 8 HOURS PRN
Qty: 30 TABLET | Refills: 2 | Status: SHIPPED | OUTPATIENT
Start: 2024-03-18 | End: 2024-05-30

## 2024-03-18 RX ORDER — FUROSEMIDE 40 MG/1
40 TABLET ORAL 2 TIMES DAILY
Status: ON HOLD | COMMUNITY
End: 2024-05-23

## 2024-03-18 RX ORDER — FUROSEMIDE 20 MG/1
20 TABLET ORAL
Status: DISCONTINUED | OUTPATIENT
Start: 2024-03-18 | End: 2024-03-18

## 2024-03-18 RX ORDER — HYDROCODONE BITARTRATE AND ACETAMINOPHEN 7.5; 325 MG/1; MG/1
1 TABLET ORAL EVERY 6 HOURS PRN
Qty: 28 TABLET | Refills: 0 | Status: SHIPPED | OUTPATIENT
Start: 2024-03-18 | End: 2024-05-30

## 2024-03-18 RX ORDER — FUROSEMIDE 10 MG/ML
20 INJECTION INTRAMUSCULAR; INTRAVENOUS
Status: COMPLETED | OUTPATIENT
Start: 2024-03-18 | End: 2024-03-18

## 2024-03-18 RX ADMIN — FUROSEMIDE 20 MG: 10 INJECTION INTRAMUSCULAR; INTRAVENOUS at 03:03

## 2024-03-18 NOTE — PROGRESS NOTES
"     Raymon Tyler MD    [address] [place] [address]     PATIENT NAME: Tiny Gutierrez  : 1945  DATE: 3/18/24  MRN: [id number]      Billing Provider: Raymon Tyler MD  Level of Service: OK OFFICE/OUTPT VISIT, EST, LEVL IV, 30-39 MIN  Patient PCP Information       Provider PCP Type    Raymon Tyler MD General            Reason for Visit / Chief Complaint: Leg Swelling (Patient c/o leg and feet swelling and tenderness that continue to get worse since last Thursday. Patient tried elevation with no relief. He states on his posterior thigh "it is hard as a rock". ), Nausea (Patient c/o nausea at random times that comes and goes for 2 month.), Follow-up (Patient saw his Neurologist on 24. They did lab work and his lactic acid level came back abnormal. They wanted this to be redrawn but never got back with patient about him getting it drawn here. ), and Medication Refill (Patient would like to discuss a medication for pain. He took Norco yesterday for pain. He got these when he had his stomach surgery back in March of last year. )       Update PCP  Update Chief Complaint         History of Present Illness / Problem Focused Workflow     Tiny Gutierrez presents to the clinic with Leg Swelling (Patient c/o leg and feet swelling and tenderness that continue to get worse since last Thursday. Patient tried elevation with no relief. He states on his posterior thigh "it is hard as a rock". ), Nausea (Patient c/o nausea at random times that comes and goes for 2 month.), Follow-up (Patient saw his Neurologist on 24. They did lab work and his lactic acid level came back abnormal. They wanted this to be redrawn but never got back with patient about him getting it drawn here. ), and Medication Refill (Patient would like to discuss a medication for pain. He took Norco yesterday for pain. He got these when he had his stomach surgery back in " last year. )     Leg swelling - severe swelling in his lower extremities, this acutely worsened 4 days ago and has a firmness behind the upper leg, he also has swelling in his abdomen, about a 13-20 pound weight gain in the past week or so. He is currently taking lasix and spironolactone. He denies any acute shortness of breath, not anything worse than previous.     Recurrent nausea -     Lactic acid lab draw -     Chronic pain - he rarely takes some hydrocodone, despite having a lot of pain from previous surgeries. He just does not want to take it, but with his Pancreatic cancer history, he qualifies for pain control without no real restrictions    HPI    Review of Systems     Review of Systems   Constitutional:  Positive for fatigue. Negative for activity change, appetite change, chills and fever.   HENT:  Negative for nasal congestion, ear pain, hearing loss, postnasal drip and sore throat.    Respiratory:  Positive for shortness of breath. Negative for cough, chest tightness and wheezing.    Cardiovascular:  Positive for leg swelling. Negative for chest pain, palpitations and claudication.   Gastrointestinal:  Positive for nausea. Negative for abdominal pain, change in bowel habit, constipation, diarrhea and vomiting.   Genitourinary:  Negative for dysuria.   Musculoskeletal:  Positive for arthralgias, back pain, gait problem, leg pain and myalgias.   Integumentary:  Negative for rash.   Neurological:  Positive for weakness and coordination difficulties. Negative for headaches.   Psychiatric/Behavioral:  Negative for suicidal ideas. The patient is not nervous/anxious.         Medical / Social / Family History     Past Medical History:   Diagnosis Date    Aortic heart murmur     Asthma     Cancer     Deep vein thrombosis     Emphysema/COPD     Hyperlipidemia     Ischemic cardiomyopathy     Mitral regurgitation     Old myocardial infarction 03/2001    Tricuspid regurgitation     Vitamin B 12 deficiency     Vitamin  D deficiency        Past Surgical History:   Procedure Laterality Date    CARDIAC CATHETERIZATION      CARDIAC DEFIBRILLATOR PLACEMENT      CHOLECYSTECTOMY      CORONARY ARTERY BYPASS GRAFT      TOTAL KNEE ARTHROPLASTY Bilateral        Social History    reports that he has never smoked. He has never been exposed to tobacco smoke. He has never used smokeless tobacco. He reports that he does not drink alcohol and does not use drugs.    Family History  's family history includes Emphysema in his father.    Medications and Allergies     Medications  Outpatient Medications Marked as Taking for the 3/18/24 encounter (Office Visit) with Raymon Tyler MD   Medication Sig Dispense Refill    albuterol (PROVENTIL) 2.5 mg /3 mL (0.083 %) nebulizer solution Take 3 mLs (2.5 mg total) by nebulization as needed for Wheezing or Shortness of Breath. 300 mL 5    albuterol (PROVENTIL/VENTOLIN HFA) 90 mcg/actuation inhaler 2 puffs as needed      apixaban (ELIQUIS) 2.5 mg Tab Take 2.5 mg by mouth 2 (two) times daily.      atorvastatin (LIPITOR) 40 MG tablet Take 1 tablet (40 mg total) by mouth once daily. For CHOLESTEROL 90 tablet 1    BREO ELLIPTA 200-25 mcg/dose DsDv diskus inhaler 1 puff once daily.      calcium carbonate/vitamin D3 (CALTRATE 600 + D ORAL) Take 1 capsule by mouth Daily.      CREON 24,000-76,000 -120,000 unit capsule Take 1 capsule by mouth 3 (three) times daily.      divalproex (DEPAKOTE SPRINKLE) 125 mg capsule Take 4 capsules (500 mg total) by mouth once daily. 120 capsule 5    furosemide (LASIX) 40 MG tablet Take 40 mg by mouth 2 (two) times daily.      LORazepam (ATIVAN) 0.5 MG tablet Take 1 tablet (0.5 mg total) by mouth every 12 (twelve) hours as needed (for ANXIETY and AGITATION). 30 tablet 1    omeprazole (PRILOSEC) 40 MG capsule Take 1 capsule (40 mg total) by mouth 2 (two) times daily before meals. For STOMACH 180 capsule 1    sertraline (ZOLOFT) 25 MG tablet Take 1 tablet (25 mg total) by mouth  once daily. 90 tablet 1    spironolactone (ALDACTONE) 50 MG tablet Take 50 mg by mouth Daily.      [DISCONTINUED] HYDROcodone-acetaminophen (NORCO) 5-325 mg per tablet Take 1 tablet by mouth every 6 (six) hours as needed.      [DISCONTINUED] ondansetron (ZOFRAN-ODT) 4 MG TbDL Take 4 mg by mouth every 8 (eight) hours as needed.         Allergies  Review of patient's allergies indicates:   Allergen Reactions    Hay fever and allergy relief Rash    Pollen extracts Rash       Physical Examination     Vitals:    24 1353   BP: 115/70   Pulse: 84   Resp: 16   Temp: 98.2 °F (36.8 °C)     Physical Exam  Vitals and nursing note reviewed.   Constitutional:       General: He is not in acute distress.     Appearance: Normal appearance. He is not ill-appearing.   Eyes:      Extraocular Movements: Extraocular movements intact.      Pupils: Pupils are equal, round, and reactive to light.   Cardiovascular:      Rate and Rhythm: Normal rate and regular rhythm.      Heart sounds: Normal heart sounds.   Pulmonary:      Effort: Pulmonary effort is normal.      Breath sounds: Normal breath sounds.   Abdominal:      General: Abdomen is protuberant. Bowel sounds are normal.      Palpations: Abdomen is soft.      Tenderness: There is no abdominal tenderness. There is no guarding or rebound.   Musculoskeletal:         General: Normal range of motion.      Right lower le+ Pitting Edema present.      Left lower le+ Pitting Edema present.   Skin:     Findings: No rash.   Neurological:      General: No focal deficit present.      Mental Status: He is alert and oriented to person, place, and time. Mental status is at baseline.   Psychiatric:         Mood and Affect: Mood normal.         Behavior: Behavior normal.            Assessment and Plan (including Health Maintenance)      Problem List  Smart Sets  Document Outside HM   :    Plan:     He is already on 2 medications for swelling, has a long history of CHF, Cardiology opined and  believe his protein levels are a major player in his swelling, his total protein has gone down, they recommended foods high in protein to try to reverse the anasarca     Pain -     Health Maintenance Due   Topic Date Due    Shingles Vaccine (1 of 2) Never done    RSV Vaccine (Age 60+ and Pregnant patients) (1 - 1-dose 60+ series) Never done    TETANUS VACCINE  09/17/2012    COVID-19 Vaccine (4 - 2023-24 season) 09/01/2023       Problem List Items Addressed This Visit          Pulmonary    Mucopurulent chronic bronchitis (Chronic)       Cardiac/Vascular    Coronary artery disease involving native heart without angina pectoris (Chronic)    Atrial flutter (Chronic)    Chronic systolic (congestive) heart failure - Primary (Chronic)    Relevant Orders    NT-Pro Natriuretic Peptide (Completed)    Comprehensive Metabolic Panel (Completed)    CBC Auto Differential (Completed)    Urinalysis, Reflex to Urine Culture (Completed)       Endocrine    Moderate protein-calorie malnutrition (Chronic)       GI    Pancreatic mass (Chronic)    Relevant Medications    HYDROcodone-acetaminophen (NORCO) 7.5-325 mg per tablet    ondansetron (ZOFRAN-ODT) 4 MG TbDL    Cirrhosis of liver with ascites, unspecified hepatic cirrhosis type (Chronic)       Orthopedic    Back pain (Chronic)    Relevant Medications    HYDROcodone-acetaminophen (NORCO) 7.5-325 mg per tablet       Other    Bilateral lower extremity edema (Chronic)    Anasarca (Chronic)     Other Visit Diagnoses       Abdominal distention  (Chronic)       Relevant Orders    X-Ray Abdomen Flat And Erect (Completed)            Health Maintenance Topics with due status: Not Due       Topic Last Completion Date    Colonoscopy 06/30/2014    Lipid Panel 09/03/2023       Procedures     Future Appointments   Date Time Provider Department Center   7/29/2024 10:30 AM James Hughes MD [facility]        Follow up in 1 month (on 4/18/2024), or if symptoms worsen or fail to improve,  for chronic health problems.     Signature:  Raymon Tyler MD  [facility], [address] [place] [address]  Phone #: [phone]  Fax #: [phone]    Date of encounter: 3/18/24    There are no Patient Instructions on file for this visit.

## 2024-03-21 ENCOUNTER — TELEPHONE (OUTPATIENT)
Dept: CARDIOLOGY | Facility: CLINIC | Age: 79
End: 2024-03-21
Payer: MEDICARE

## 2024-03-21 NOTE — TELEPHONE ENCOUNTER
3/19/24--Pt's wife called with concern about pt's edema.  States that the increase in Lasix for 3 days only decrease his weight by 2 pounds and then he gained it back.  States he is not urinating a lot after taking Lasix.  Pt states his legs are swollen to above his knees.  I discussed this with Dr. Hughes and Dr. Hughes reviewed his labs done on 3/18/24.      Dr. Hughes states:  Lower extremity is primarily due to hypoalbuminemia with third spacing into legs (anasarca).  His labs suggest intravascular volume depletion.  Kidneys are ressistent to diuresis due to intravascular volume depletion.  Correcting with IV albumin would temporarily relieve his edema (days).  Recommend nutritional support but further diuresing will result in hypotension and dehydration.    I called and notified wife and discussed protein rich foods and supplements with her. She voiced understanding.

## 2024-03-22 PROBLEM — R60.0 BILATERAL LOWER EXTREMITY EDEMA: Chronic | Status: ACTIVE | Noted: 2023-07-10

## 2024-03-22 PROBLEM — R60.1 ANASARCA: Chronic | Status: ACTIVE | Noted: 2024-03-22

## 2024-04-02 ENCOUNTER — EXTERNAL HOME HEALTH (OUTPATIENT)
Dept: HOME HEALTH SERVICES | Facility: HOSPITAL | Age: 79
End: 2024-04-02
Payer: MEDICARE

## 2024-04-02 DIAGNOSIS — R42 DIZZINESS: Primary | ICD-10-CM

## 2024-04-02 RX ORDER — MECLIZINE HYDROCHLORIDE 25 MG/1
25 TABLET ORAL 3 TIMES DAILY PRN
Qty: 60 TABLET | Refills: 1 | Status: SHIPPED | OUTPATIENT
Start: 2024-04-02 | End: 2024-04-16

## 2024-04-02 RX ORDER — MECLIZINE HYDROCHLORIDE 25 MG/1
25 TABLET ORAL 3 TIMES DAILY PRN
COMMUNITY
End: 2024-04-02 | Stop reason: SDUPTHER

## 2024-04-16 ENCOUNTER — OFFICE VISIT (OUTPATIENT)
Dept: FAMILY MEDICINE | Facility: CLINIC | Age: 79
End: 2024-04-16
Payer: MEDICARE

## 2024-04-16 VITALS
TEMPERATURE: 99 F | WEIGHT: 207 LBS | DIASTOLIC BLOOD PRESSURE: 64 MMHG | HEART RATE: 72 BPM | OXYGEN SATURATION: 97 % | HEIGHT: 72 IN | BODY MASS INDEX: 28.04 KG/M2 | SYSTOLIC BLOOD PRESSURE: 104 MMHG

## 2024-04-16 DIAGNOSIS — N04.9 NEPHROTIC SYNDROME: Chronic | ICD-10-CM

## 2024-04-16 DIAGNOSIS — L98.9 SKIN LESION OF RIGHT ARM: Chronic | ICD-10-CM

## 2024-04-16 DIAGNOSIS — R60.1 ANASARCA: Primary | Chronic | ICD-10-CM

## 2024-04-16 PROCEDURE — 99214 OFFICE O/P EST MOD 30 MIN: CPT | Mod: ,,, | Performed by: FAMILY MEDICINE

## 2024-04-16 RX ORDER — LANOLIN ALCOHOL/MO/W.PET/CERES
1 CREAM (GRAM) TOPICAL EVERY MORNING
COMMUNITY

## 2024-04-16 NOTE — PROGRESS NOTES
Raymon Tyler MD    [address] [place] [address]     PATIENT NAME: Tiny Gutierrez  : 1945  DATE: 24  MRN: [id number]      Billing Provider: Raymon Tyler MD  Level of Service: IA OFFICE/OUTPT VISIT, EST, LEVL IV, 30-39 MIN  Patient PCP Information       Provider PCP Type    Raymon Tyler MD General            Reason for Visit / Chief Complaint: Edema (Patient here to follow up with leg and ankle swelling. He states he had 54 lbs of fluid removed 3 weeks ago and the fluid is coming back. He states he cannot pick his feet up. Cardiology appointment with Shira Reina NP for Dr. Hughes on 2024 @ 2:20.), Arm Problem (Patient has a spot on his right arm that he wants to be looked at. ), Fall (Fall on Saturday. He received a bruise on his right arm from the fall. ), Medication Problem (Patient Eliquis 2.5 mg BID on our list. Patient spouse stated someone instructed them to do 5 mg BID she cannot recall who did it and want to discuss this. She also want to discuss patient fluid pills and Depakote. ), and Dizziness       Update PCP  Update Chief Complaint         History of Present Illness / Problem Focused Workflow     Tiny Gutierrez presents to the clinic with Edema (Patient here to follow up with leg and ankle swelling. He states he had 54 lbs of fluid removed 3 weeks ago and the fluid is coming back. He states he cannot pick his feet up. Cardiology appointment with Shira Reina NP for Dr. Hughes on 2024 @ 2:20.), Arm Problem (Patient has a spot on his right arm that he wants to be looked at. ), Fall (Fall on Saturday. He received a bruise on his right arm from the fall. ), Medication Problem (Patient Eliquis 2.5 mg BID on our list. Patient spouse stated someone instructed them to do 5 mg BID she cannot recall who did it and want to discuss this. She also want to discuss patient fluid pills and Depakote. ), and  Dizziness     Skin lesion on the right arm - started quickly and enlarged     Fluid retention - he had 4.1 liters removed from his abdomen about 3 weeks ago, now the fluid is back. He has gained 30 pounds in the past 3 weeks. He has been told that he had too low of albumin and that is the cause of his fluid retention. His legs are very swollen and his abdomen is also starting to swell again. He was admitted to [facility] and responded well to IV Diuresis.     Eliquis - was 2.5 BID and now he is on 5mg BID. I recommend him continue the 5mg BID for now     HPI    Review of Systems     Review of Systems   Constitutional:  Negative for activity change, appetite change, chills, fatigue and fever.   HENT:  Negative for nasal congestion, ear pain, hearing loss, postnasal drip and sore throat.    Respiratory:  Negative for cough, chest tightness, shortness of breath and wheezing.    Cardiovascular:  Negative for chest pain, palpitations, leg swelling and claudication.   Gastrointestinal:  Negative for abdominal pain, change in bowel habit, constipation, diarrhea, nausea and vomiting.   Genitourinary:  Negative for dysuria.   Musculoskeletal:  Negative for arthralgias, back pain, gait problem and myalgias.   Integumentary:  Negative for rash.   Neurological:  Negative for weakness and headaches.   Psychiatric/Behavioral:  Negative for suicidal ideas. The patient is not nervous/anxious.         Medical / Social / Family History     Past Medical History:   Diagnosis Date    Aortic heart murmur     Asthma     Cancer     Deep vein thrombosis     Emphysema/COPD     Hyperlipidemia     Ischemic cardiomyopathy     Mitral regurgitation     Old myocardial infarction 03/2001    Tricuspid regurgitation     Vitamin B 12 deficiency     Vitamin D deficiency        Past Surgical History:   Procedure Laterality Date    CARDIAC CATHETERIZATION      CARDIAC DEFIBRILLATOR PLACEMENT      CHOLECYSTECTOMY      CORONARY ARTERY BYPASS GRAFT       TOTAL KNEE ARTHROPLASTY Bilateral        Social History    reports that he has never smoked. He has never been exposed to tobacco smoke. He has never used smokeless tobacco. He reports that he does not drink alcohol and does not use drugs.    Family History  's family history includes Emphysema in his father.    Medications and Allergies     Medications  Current Outpatient Medications   Medication Sig Dispense Refill    albuterol (PROVENTIL) 2.5 mg /3 mL (0.083 %) nebulizer solution Take 3 mLs (2.5 mg total) by nebulization as needed for Wheezing or Shortness of Breath. 300 mL 5    albuterol (PROVENTIL/VENTOLIN HFA) 90 mcg/actuation inhaler 2 puffs as needed      apixaban (ELIQUIS) 2.5 mg Tab Take 2.5 mg by mouth 2 (two) times daily.      atorvastatin (LIPITOR) 40 MG tablet Take 1 tablet (40 mg total) by mouth once daily. For CHOLESTEROL 90 tablet 1    BREO ELLIPTA 200-25 mcg/dose DsDv diskus inhaler 1 puff once daily.      calcium carbonate/vitamin D3 (CALTRATE 600 + D ORAL) Take 1 capsule by mouth Daily.      CREON 24,000-76,000 -120,000 unit capsule Take 1 capsule by mouth 3 (three) times daily.      EScitalopram oxalate (LEXAPRO) 10 MG tablet Take 10 mg by mouth.      furosemide (LASIX) 40 MG tablet Take 40 mg by mouth 2 (two) times daily.      HYDROcodone-acetaminophen (NORCO) 7.5-325 mg per tablet Take 1 tablet by mouth every 6 (six) hours as needed for Pain. 28 tablet 0    LORazepam (ATIVAN) 0.5 MG tablet Take 1 tablet (0.5 mg total) by mouth every 12 (twelve) hours as needed (for ANXIETY and AGITATION). 30 tablet 1    magnesium oxide (MAG-OX) 400 mg (241.3 mg magnesium) tablet Take 1 tablet by mouth every morning.      omeprazole (PRILOSEC) 40 MG capsule Take 1 capsule (40 mg total) by mouth 2 (two) times daily before meals. For STOMACH 180 capsule 1    ondansetron (ZOFRAN-ODT) 4 MG TbDL Take 1 tablet (4 mg total) by mouth every 8 (eight) hours as needed (NAUSEA). 30 tablet 2    REMERON 15 mg tablet  Take 7.5-15 mg by mouth every evening.      sertraline (ZOLOFT) 25 MG tablet Take 1 tablet (25 mg total) by mouth once daily. 90 tablet 1    spironolactone (ALDACTONE) 50 MG tablet Take 50 mg by mouth Daily.       No current facility-administered medications for this visit.       Allergies  Review of patient's allergies indicates:   Allergen Reactions    Hay fever and allergy relief Rash    Pollen extracts Rash       Physical Examination     Vitals:    24 1525   BP: 104/64   Pulse: 72   Temp: 98.8 °F (37.1 °C)     Physical Exam  Vitals and nursing note reviewed.   Constitutional:       General: He is not in acute distress.     Appearance: Normal appearance. He is not ill-appearing.   Eyes:      Extraocular Movements: Extraocular movements intact.      Pupils: Pupils are equal, round, and reactive to light.   Cardiovascular:      Rate and Rhythm: Normal rate and regular rhythm.      Heart sounds: Normal heart sounds.   Pulmonary:      Effort: Pulmonary effort is normal.      Breath sounds: Normal breath sounds.   Abdominal:      General: Bowel sounds are normal.      Palpations: Abdomen is soft.   Musculoskeletal:         General: Normal range of motion.      Right lower le+ Pitting Edema present.      Left lower le+ Pitting Edema present.   Skin:     Findings: No rash.             Comments: Protruding skin lesion on the right forearm, approximately 2.5cm in diameter, no erythema or drainage from the lesions, thickened dry skin on the surface of the lesion    Neurological:      General: No focal deficit present.      Mental Status: He is alert and oriented to person, place, and time. Mental status is at baseline.   Psychiatric:         Mood and Affect: Mood normal.         Behavior: Behavior normal.        Edema from his toes to his abdomen    Assessment and Plan (including Health Maintenance)      Problem List  Smart Sets  Document Outside HM   :    Plan:     Complicated health problems.     Liver  cirrhosis, heart failure, fluid overload, 30 pound weight gain.     I believe he will end up inpatient again for IV diuresis. This does not seem like a reasonable long term strategy     I am referring him to Nephrology and will request Cardiology see him ASAP     Dermatology referral for the skin lesion on his right arm     She will add back Depakote at one capsule per day, and then perhaps increase it to 2 if tolerated         Health Maintenance Due   Topic Date Due    Shingles Vaccine (1 of 2) Never done    RSV Vaccine (Age 60+ and Pregnant patients) (1 - 1-dose 60+ series) Never done    TETANUS VACCINE  09/17/2012    COVID-19 Vaccine (3 - Moderna risk series) 12/20/2021    Colonoscopy  06/30/2024       Problem List Items Addressed This Visit          Derm    Skin lesion of right arm (Chronic)    Relevant Orders    Ambulatory referral/consult to Dermatology       Renal/    Nephrotic syndrome (Chronic)    Relevant Orders    Ambulatory referral/consult to Nephrology       Other    Anasarca - Primary (Chronic)       Health Maintenance Topics with due status: Not Due       Topic Last Completion Date    Lipid Panel 09/03/2023       Procedures     No future appointments.       Follow up if symptoms worsen or fail to improve.     Signature:  Raymon Tyler MD  [address] [place] [address]  Phone #: [phone]  Fax #: [phone]    Date of encounter: 4/16/24    There are no Patient Instructions on file for this visit.

## 2024-04-16 NOTE — PROGRESS NOTES
Appointment was made for [facility]  for 05/14/2024 at 1:40 pm. Patient will be added to a waiting list for cancellations. Patient gave verbal understanding.   Referral placed for Nephrology at [facility] with Lorie Josue. Referral Department will call with appointment date and time.

## 2024-04-17 ENCOUNTER — DOCUMENT SCAN (OUTPATIENT)
Dept: HOME HEALTH SERVICES | Facility: HOSPITAL | Age: 79
End: 2024-04-17
Payer: MEDICARE

## 2024-04-17 ENCOUNTER — TELEPHONE (OUTPATIENT)
Dept: CARDIOLOGY | Facility: CLINIC | Age: 79
End: 2024-04-17
Payer: MEDICARE

## 2024-04-17 NOTE — TELEPHONE ENCOUNTER
"Spoke with pt wife about bringing pt on to ER due to his 33+lb weight gain. She states, "he is not in any distress yet, but he is full of fluid. It is in his abdomen and legs." I advised that we do not direct admit from the clinic anymore and that in the ER they could get labs back quicker, tests back quicker, and if the fluid was needed to be drawn off, they could do that while he was admitted and hooked to a heart monitor. She verbalized understanding and states, "he won't come tonight, but I will try to get him to come to the ER tomorrow so they can get him feeling better." Pt wife agreed to cancel appt tomorrow and bring pt to ER if pt will come.   "

## 2024-04-17 NOTE — TELEPHONE ENCOUNTER
Pt wife called and LVM stating pt had gained 33lbs since his HD at [facility] 3 weeks ago. She states they simba off 4.1L of fluid and she wants him admitted and it drawn off again.     I attempted to contact wife to advise her to bring pt on to ER tonight and not wait. I am going to explain when I talk to her if pt has this much weight gain, we will just take him straight to ER from our clinic. LVM for pt wife to return my call.

## 2024-04-25 ENCOUNTER — DOCUMENT SCAN (OUTPATIENT)
Dept: HOME HEALTH SERVICES | Facility: HOSPITAL | Age: 79
End: 2024-04-25
Payer: MEDICARE

## 2024-04-30 DIAGNOSIS — R53.82 CHRONIC FATIGUE: Primary | Chronic | ICD-10-CM

## 2024-04-30 RX ORDER — CYANOCOBALAMIN 1000 UG/ML
1000 INJECTION, SOLUTION INTRAMUSCULAR; SUBCUTANEOUS ONCE
Qty: 1 ML | Refills: 0 | Status: SHIPPED | OUTPATIENT
Start: 2024-04-30 | End: 2024-04-30

## 2024-04-30 NOTE — TELEPHONE ENCOUNTER
Called stating her  does not feel well, he has no energy, he does not feel like sitting up. She wants to know what can she give him to help him have some energy.

## 2024-05-01 ENCOUNTER — DOCUMENT SCAN (OUTPATIENT)
Dept: HOME HEALTH SERVICES | Facility: HOSPITAL | Age: 79
End: 2024-05-01
Payer: MEDICARE

## 2024-05-02 ENCOUNTER — OFFICE VISIT (OUTPATIENT)
Dept: CARDIOLOGY | Facility: CLINIC | Age: 79
End: 2024-05-02
Payer: MEDICARE

## 2024-05-02 VITALS
DIASTOLIC BLOOD PRESSURE: 58 MMHG | WEIGHT: 196.19 LBS | BODY MASS INDEX: 26.57 KG/M2 | SYSTOLIC BLOOD PRESSURE: 102 MMHG | HEIGHT: 72 IN | HEART RATE: 78 BPM | OXYGEN SATURATION: 96 %

## 2024-05-02 DIAGNOSIS — I48.92 ATRIAL FLUTTER, UNSPECIFIED TYPE: Chronic | ICD-10-CM

## 2024-05-02 DIAGNOSIS — R06.02 SOB (SHORTNESS OF BREATH): ICD-10-CM

## 2024-05-02 DIAGNOSIS — I50.22 CHRONIC SYSTOLIC (CONGESTIVE) HEART FAILURE: Chronic | ICD-10-CM

## 2024-05-02 DIAGNOSIS — I25.10 ATHEROSCLEROSIS OF NATIVE CORONARY ARTERY OF NATIVE HEART WITHOUT ANGINA PECTORIS: Chronic | ICD-10-CM

## 2024-05-02 DIAGNOSIS — I10 PRIMARY HYPERTENSION: Chronic | ICD-10-CM

## 2024-05-02 DIAGNOSIS — R60.0 BILATERAL LOWER EXTREMITY EDEMA: Chronic | ICD-10-CM

## 2024-05-02 DIAGNOSIS — Z79.899 OTHER LONG TERM (CURRENT) DRUG THERAPY: ICD-10-CM

## 2024-05-02 DIAGNOSIS — I42.9 CARDIOMYOPATHY, UNSPECIFIED TYPE: Primary | ICD-10-CM

## 2024-05-02 PROCEDURE — 93010 ELECTROCARDIOGRAM REPORT: CPT | Mod: S$PBB,,, | Performed by: INTERNAL MEDICINE

## 2024-05-02 PROCEDURE — 93005 ELECTROCARDIOGRAM TRACING: CPT | Mod: PBBFAC | Performed by: INTERNAL MEDICINE

## 2024-05-02 PROCEDURE — 99214 OFFICE O/P EST MOD 30 MIN: CPT | Mod: S$PBB,,, | Performed by: NURSE PRACTITIONER

## 2024-05-02 PROCEDURE — 99215 OFFICE O/P EST HI 40 MIN: CPT | Mod: PBBFAC,25 | Performed by: NURSE PRACTITIONER

## 2024-05-02 RX ORDER — FERROUS SULFATE TAB 325 MG (65 MG ELEMENTAL FE) 325 (65 FE) MG
1 TAB ORAL 3 TIMES DAILY
COMMUNITY
Start: 2024-05-02

## 2024-05-02 RX ORDER — SACUBITRIL AND VALSARTAN 24; 26 MG/1; MG/1
1 TABLET, FILM COATED ORAL 2 TIMES DAILY
Qty: 60 TABLET | Refills: 1 | Status: ON HOLD | OUTPATIENT
Start: 2024-05-02 | End: 2024-05-23 | Stop reason: HOSPADM

## 2024-05-02 RX ORDER — DAPAGLIFLOZIN 10 MG/1
10 TABLET, FILM COATED ORAL DAILY
Qty: 30 TABLET | Refills: 2 | Status: ON HOLD | OUTPATIENT
Start: 2024-05-02 | End: 2024-05-23 | Stop reason: HOSPADM

## 2024-05-02 RX ORDER — CYANOCOBALAMIN 1000 UG/ML
1000 INJECTION, SOLUTION INTRAMUSCULAR; SUBCUTANEOUS
COMMUNITY
Start: 2024-04-30 | End: 2024-05-30

## 2024-05-03 LAB
OHS QRS DURATION: 96 MS
OHS QTC CALCULATION: 415 MS

## 2024-05-03 RX ORDER — SACUBITRIL AND VALSARTAN 24; 26 MG/1; MG/1
1 TABLET, FILM COATED ORAL 2 TIMES DAILY
Qty: 28 TABLET | Refills: 1 | Status: ON HOLD | COMMUNITY
Start: 2024-05-03 | End: 2024-05-23

## 2024-05-03 RX ORDER — DAPAGLIFLOZIN 10 MG/1
10 TABLET, FILM COATED ORAL DAILY
Qty: 7 TABLET | Refills: 2 | Status: ON HOLD | COMMUNITY
Start: 2024-05-03 | End: 2024-05-23

## 2024-05-03 NOTE — ASSESSMENT & PLAN NOTE
Patient is identified as having Systolic (HFrEF) heart failure that is Chronic. CHF is currently controlled. Latest ECHO performed and demonstrates- Results for orders placed during the hospital encounter of 08/02/23    Echo    Interpretation Summary    Left Ventricle: regional wall motion abnormalities present. There is mildly reduced systolic function. Ejection fraction by visual approximation is 40%. There is diastolic dysfunction.    Left Atrium: Left atrium is mildly dilated.    Right Atrium: Right atrium is mildly dilated. Catheter present in the right atrium.    Aortic Valve: There is moderate aortic valve sclerosis.    Mitral Valve: There is mild to moderate regurgitation.    Tricuspid Valve: There is mild transvalvular regurgitation.  . Continue Furosemide and Aldactone and monitor clinical status closely.   Daily weights have been stable thus far after recent hospital DC according to the patient. His SOB has improved as well.   Add Entresto 24/26 mg po bid and Farxiga 10 mg po daily  Increase protein in diet  1.5 L fluid restriction  Continue lasix and aldactone  Cmp,  pBNP and mag in 2 weeks  Follow up with Dr. Hughes 5/23/2024 at 2 pm or sooner if needed

## 2024-05-03 NOTE — PROGRESS NOTES
PCP: Raymon Tyler MD    Referring Provider:   Chief Complaint   Patient presents with    Hypertension    Hyperlipidemia    Coronary Artery Disease    Shortness of Breath    Hospital Follow Up    Edema     Goes down at night.    Pre Syncope     Last Friday night while bending over in the deep freeze getting bread.       Subjective:   Tiny Gutierrez is a 78 y.o. male with hx of CAD s/p CABG (2007), iCMP s/p ICD, HTN, HLD, CKD, COPD, prostate cancer, pancreatic cancer, LV thrombus ( 7/18/2021 echo, [facility] [place] [place]) and atrial flutter,  who presents for HD follow up from [facility]  2 times in the last 2 months. His mos recent admission was 4/18/2024 for diuresis after gaining 30 lbs in a week. He had been diuresed 50 lb s in a week prior to dc initially.  He also had a paracentesis on the first admission but this was not required  upon readmission. He was diuresed and discharged home.   The patient states that overall he feels some better. He continues to have some SOB at times and lower extremity edema that resolves at HS. He reports that his daily weights have been stable.       This is a TCC visit. Records from [facility] and labs from [facility] were reviewed as well as meds were reviewed and reconciled.    1/26/2024 (Dr. Hughes) presents for  6 month follow up.       9/23/23--Tiny Gutierrez is a 78 y.o. male with hx of CAD s/p CABG (2007), iCMP s/p ICD, HTN, HLD, CKD, COPD, prostate cancer, pancreatic cancer, LV thrombus ( 7/18/2021 echo, [facility] [place] [place]) and atrial flutter,  who presents for follow up after last hospitalization.      8/2/23--Tiny Gutierrez is a 78 y.o. male with hx of CAD s/p CABG (2007), iCMP s/p ICD, HTN, HLD, asthma, CKD, COPD, prostate cancer, pancreatic cancer, LV thrombus ( 7/18/2021 echo, [facility] [place] [place]) and atrial flutter,  who presents for return check requested for severe BLE edema. He denies chest pain , orthopna or PND but has chronic, stable CONRAD. The lower  "ext edema began approx 3 weeks ago after starting a "cancer med". This med has been stopped by his oncologist and lasix dose increased to 40 mg with the addition of aldactone 50 mg po daily with improvement but not resolution of the edema. His serum albumin is also 2.1 which is a contributing factor. An echocardiogram was done earlier today and results are pending.         Fhx:  Family History   Problem Relation Name Age of Onset    Emphysema Father       Shx:   Social History     Socioeconomic History    Marital status:    Tobacco Use    Smoking status: Never     Passive exposure: Never    Smokeless tobacco: Never   Substance and Sexual Activity    Alcohol use: Never    Drug use: Never    Sexual activity: Yes     Partners: Female     Social Determinants of Health     Financial Resource Strain: Low Risk  (4/18/2024)    Received from [facility] Subsidiaries and Affiliates    Overall Financial Resource Strain (CARDIA)     Difficulty of Paying Living Expenses: Not hard at all   Food Insecurity: No Food Insecurity (4/18/2024)    Received from [facility] Subsidi[facility] and Aff[facility]    Hunger Vital Sign     Worried About Running Out of Food in the Last Year: Never true     Ran Out of Food in the Last Year: Never true   Transportation Needs: No Transportation Needs (4/18/2024)    Received from [employer] [facility] and Its Subsidiaries and Affiliates    PRAPARE - Transportation     Lack of Transportation (Medical): No     Lack of Transportation (Non-Medical): No   Housing Stability: Not At Risk (3/22/2024)    Received from [facility]    BOC Housing Stability Source     Housing Insecurity: 1       EKG   Results for orders placed or performed in visit on 05/02/24   EKG 12-lead    Collection Time: 05/02/24  2:11 PM   Result Value Ref Range    QRS Duration 96 ms    OHS " QTC Calculation 415 ms    Narrative    Test Reason : R06.02,    Vent. Rate : 069 BPM     Atrial Rate : 069 BPM     P-R Int : 136 ms          QRS Dur : 096 ms      QT Int : 388 ms       P-R-T Axes : 058 116 -22 degrees     QTc Int : 415 ms    Normal sinus rhythm  Left posterior fascicular block  Nonspecific T wave abnormality  Abnormal ECG  When compared with ECG of 26-SEP-2023 10:48,  Criteria for Anterior infarct are no longer Present  Nonspecific T wave abnormality now evident in Lateral leads  Confirmed by James Hughes MD ([id number]) on 5/3/2024 8:54:51 AM    Referred By:             Confirmed By:James Hughes MD      9/26/23--NSR, rightward axis, 85 bpm       Echo 3/29/2024 at [facility].  Technically difficult study with limited views.  Contrasted images   utilized.   Normal LV size with severely reduced systolic function.  Estimated LVEF   25%.   There is akinesis of the mid to apical anteroseptal wall and entire apex.   There is a small apical thrombus on contrasted images.   No significant valvular abnormalities.   Moderate biatrial enlargement.  Echo    8/30/23--EF 40%.  Focal apical akinesis.  LV mildly dilated. Left atrium moderately dilated.  Mild AS.  Mild MR.     8/2/23--Interpretation Summary    Left Ventricle: regional wall motion abnormalities present. There is mildly reduced systolic function. Ejection fraction by visual approximation is 40%. There is diastolic dysfunction.    Left Atrium: Left atrium is mildly dilated.    Right Atrium: Right atrium is mildly dilated. Catheter present in the right atrium.    Aortic Valve: There is moderate aortic valve sclerosis.    Mitral Valve: There is mild to moderate regurgitation.    Tricuspid Valve: There is mild transvalvular regurgitation.      2/17/23--Interpretation Summary  · Mild right ventricular enlargement with normal right ventricular systolic function.  · Moderate mitral regurgitation.  · Mild to moderate tricuspid regurgitation.  · Normal central  venous pressure (3 mmHg).  · The estimated PA systolic pressure is 28 mmHg.  · Left ventricular diastolic dysfunction.  · Mildly decreased systolic function.  · The estimated ejection fraction is 40%.  · There are segmental left ventricular wall motion abnormalities.  · Mild left atrial enlargement.    LHC 3/20/15--3 V CAD with patent grafts to the LAD and obtuse marginal.  Moderate LV systolic dysfunction, LVEF 35-40%.  Normal LV hemodynamics.      3/28/07--3 V CAD.  Segmental wall motion abnormality with severely reduced LVSF, EF 20%.      CABG:     3/29/07--LIMA to LAD, SVG to RCA and OM.     ICD:     10/3/19--R/R end of life ICD with new MRI compatible ICD.      8/8/13--R/ R end of life ICD with new dual chamber ICD.     1/15/09--implantation of dual chamber PPM ICD.  .        Lab Results   Component Value Date     04/19/2024    K 3.4 (L) 04/19/2024     04/19/2024    CO2 27 04/19/2024    BUN 18 04/19/2024    CREATININE 0.99 04/19/2024    CALCIUM 8.8 04/19/2024    ANIONGAP 8 04/19/2024    ESTGFRAFRICA 77 04/19/2024    EGFRNONAA 67 10/27/2021       Lab Results   Component Value Date    CHOL 185 10/27/2021    CHOL 119 08/18/2020     Lab Results   Component Value Date    HDL 54 10/27/2021    HDL 44 08/18/2020     Lab Results   Component Value Date    LDLCALC 112 10/27/2021    LDLCALC 57 08/18/2020     Lab Results   Component Value Date    TRIG 97 10/27/2021    TRIG 90 08/18/2020     Lab Results   Component Value Date    CHOLHDL 3.4 10/27/2021    CHOLHDL 2.7 08/18/2020       Lab Results   Component Value Date    WBC 5.75 03/18/2024    HGB 10.0 (L) 03/18/2024    HCT 29.2 (L) 03/18/2024    .7 (H) 03/18/2024     03/18/2024           Current Outpatient Medications:     albuterol (PROVENTIL) 2.5 mg /3 mL (0.083 %) nebulizer solution, Take 3 mLs (2.5 mg total) by nebulization as needed for Wheezing or Shortness of Breath., Disp: 300 mL, Rfl: 5    albuterol (PROVENTIL/VENTOLIN HFA) 90 mcg/actuation  inhaler, 2 puffs as needed, Disp: , Rfl:     apixaban (ELIQUIS) 2.5 mg Tab, Take 2.5 mg by mouth 2 (two) times daily., Disp: , Rfl:     atorvastatin (LIPITOR) 40 MG tablet, Take 1 tablet (40 mg total) by mouth once daily. For CHOLESTEROL, Disp: 90 tablet, Rfl: 1    BREO ELLIPTA 200-25 mcg/dose DsDv diskus inhaler, 1 puff once daily., Disp: , Rfl:     calcium carbonate/vitamin D3 (CALTRATE 600 + D ORAL), Take 1 capsule by mouth Daily., Disp: , Rfl:     CREON 24,000-76,000 -120,000 unit capsule, Take 1 capsule by mouth 3 (three) times daily., Disp: , Rfl:     cyanocobalamin 1,000 mcg/mL injection, Inject 1,000 mcg into the muscle every 30 days., Disp: , Rfl:     EScitalopram oxalate (LEXAPRO) 10 MG tablet, Take 10 mg by mouth once daily., Disp: , Rfl:     FEROSUL 325 mg (65 mg iron) Tab tablet, Take 1 tablet by mouth 3 (three) times daily., Disp: , Rfl:     furosemide (LASIX) 40 MG tablet, Take 40 mg by mouth 2 (two) times daily., Disp: , Rfl:     HYDROcodone-acetaminophen (NORCO) 7.5-325 mg per tablet, Take 1 tablet by mouth every 6 (six) hours as needed for Pain., Disp: 28 tablet, Rfl: 0    LORazepam (ATIVAN) 0.5 MG tablet, Take 1 tablet (0.5 mg total) by mouth every 12 (twelve) hours as needed (for ANXIETY and AGITATION)., Disp: 30 tablet, Rfl: 1    magnesium oxide (MAG-OX) 400 mg (241.3 mg magnesium) tablet, Take 1 tablet by mouth every morning., Disp: , Rfl:     omeprazole (PRILOSEC) 40 MG capsule, Take 1 capsule (40 mg total) by mouth 2 (two) times daily before meals. For STOMACH, Disp: 180 capsule, Rfl: 1    sertraline (ZOLOFT) 25 MG tablet, Take 1 tablet (25 mg total) by mouth once daily., Disp: 90 tablet, Rfl: 1    spironolactone (ALDACTONE) 50 MG tablet, Take 50 mg by mouth Daily., Disp: , Rfl:     dapagliflozin propanediol (FARXIGA) 10 mg tablet, Take 1 tablet (10 mg total) by mouth once daily., Disp: 30 tablet, Rfl: 2    dapagliflozin propanediol (FARXIGA) 10 mg tablet, Take 1 tablet (10 mg total) by mouth  once daily., Disp: 7 tablet, Rfl: 2    ondansetron (ZOFRAN-ODT) 4 MG TbDL, Take 1 tablet (4 mg total) by mouth every 8 (eight) hours as needed (NAUSEA). (Patient not taking: Reported on 5/2/2024), Disp: 30 tablet, Rfl: 2    sacubitriL-valsartan (ENTRESTO) 24-26 mg per tablet, Take 1 tablet by mouth 2 (two) times daily., Disp: 60 tablet, Rfl: 1    sacubitriL-valsartan (ENTRESTO) 24-26 mg per tablet, Take 1 tablet by mouth 2 (two) times daily., Disp: 28 tablet, Rfl: 1  Medications reconciled by pt's list.     Review of Systems   Respiratory:  Positive for shortness of breath.    Cardiovascular:  Positive for leg swelling. Negative for chest pain and palpitations.   Neurological:  Positive for loss of consciousness. Negative for dizziness.        Bent over head first into freezer and fell vs passed out- oncology felt r/t anemia           Objective:   BP (!) 102/58 (BP Location: Left arm, Patient Position: Sitting)   Pulse 78   Ht 6' (1.829 m)   Wt 89 kg (196 lb 3.2 oz)   SpO2 96%   BMI 26.61 kg/m²     Physical Exam  Vitals reviewed.   Constitutional:       General: He is not in acute distress.     Appearance: Normal appearance.   HENT:      Head: Normocephalic and atraumatic.   Neck:      Vascular: No carotid bruit or JVD.   Cardiovascular:      Rate and Rhythm: Normal rate and regular rhythm.      Pulses: Normal pulses.           Radial pulses are 2+ on the right side and 2+ on the left side.      Heart sounds: Murmur heard.      Comments: II/VI HSM apex    Pulmonary:      Effort: Pulmonary effort is normal.      Breath sounds: Normal breath sounds.   Chest:      Comments: Well healed ICD site to left upper chest wall  Musculoskeletal:      Right lower leg: Edema present.      Left lower leg: Edema present.      Comments: Trace NP BLE edema to the knees   Skin:     General: Skin is warm and dry.   Neurological:      General: No focal deficit present.      Mental Status: He is alert.           Assessment:            1. Cardiomyopathy, unspecified type  sacubitriL-valsartan (ENTRESTO) 24-26 mg per tablet    dapagliflozin propanediol (FARXIGA) 10 mg tablet    sacubitriL-valsartan (ENTRESTO) 24-26 mg per tablet      2. Other long term (current) drug therapy  Comprehensive Metabolic Panel    NT-Pro Natriuretic Peptide    Magnesium      3. SOB (shortness of breath)  EKG 12-lead    EKG 12-lead      4. Atherosclerosis of native coronary artery of native heart without angina pectoris        5. Atrial flutter, unspecified type        6. Chronic systolic (congestive) heart failure        7. Primary hypertension        8. Bilateral lower extremity edema          Problem List Items Addressed This Visit          Cardiac/Vascular    Atherosclerotic heart disease of native coronary artery without angina pectoris (Chronic)    Overview     [facility] 3/20[facility] - Dr. Cardenas  1. Three Vessel CAD with patent graft to the LAD and OM  2. Moderate LV systolic dysfunction, LVEF 35-40%  3. Yuki LV hemodynamics           Current Assessment & Plan     No chest pain   Continue Liptor         Atrial flutter (Chronic)    Current Assessment & Plan     Diagnosed 12/2022  Eliquis 2.5 mg po bid for CVA prophylaxis           Chronic systolic (congestive) heart failure (Chronic)    Current Assessment & Plan     Patient is identified as having Systolic (HFrEF) heart failure that is Chronic. CHF is currently controlled. Latest ECHO performed and demonstrates- Results for orders placed during the hospital encounter of 08/02/23    Echo    Interpretation Summary    Left Ventricle: regional wall motion abnormalities present. There is mildly reduced systolic function. Ejection fraction by visual approximation is 40%. There is diastolic dysfunction.    Left Atrium: Left atrium is mildly dilated.    Right Atrium: Right atrium is mildly dilated. Catheter present in the right atrium.    Aortic Valve: There is moderate aortic valve sclerosis.    Mitral Valve: There is  mild to moderate regurgitation.    Tricuspid Valve: There is mild transvalvular regurgitation.  . Continue Furosemide and Aldactone and monitor clinical status closely.   Daily weights have been stable thus far after recent hospital DC according to the patient. His SOB has improved as well.   Add Entresto 24/26 mg po bid and Farxiga 10 mg po daily  Increase protein in diet  1.5 L fluid restriction  Continue lasix and aldactone  Cmp,  pBNP and mag in 2 weeks  Follow up with Dr. Hughes 5/23/2024 at 2 pm or sooner if needed           Primary hypertension (Chronic)    Overview      - Goal blood pressure for most patients is less than 130/85. Both numbers are important. If you have questions about your specific goal blood pressure, please ask at your clinic visits.   - Check blood pressure outside of clinic, record the numbers, and bring the log to all your office visits.   - If you have any chest pain, SOB, or other concerning symptoms, report these immediately, or go to the nearest ER.    - Recommend cardiovascular exercise, at least 3 times per week, for at least 15 minutes.   - Eat a heart healthy diet.            Current Assessment & Plan     102/58 mmHg            Other    Bilateral lower extremity edema (Chronic)    Current Assessment & Plan     Multifactoral  Continue lasix and aldactone  Improve after load reduction- add Entresto  1.5 L fluid restriction  Increase protein intake (serum albumin 2.9 on 5/1/20247          Other Visit Diagnoses       Cardiomyopathy, unspecified type    -  Primary    Other long term (current) drug therapy        SOB (shortness of breath)

## 2024-05-03 NOTE — ASSESSMENT & PLAN NOTE
Multifactoral  Continue lasix and aldactone  Improve after load reduction- add Entresto  1.5 L fluid restriction  Increase protein intake (serum albumin 2.9 on 5/1/20247

## 2024-05-06 ENCOUNTER — TELEPHONE (OUTPATIENT)
Dept: FAMILY MEDICINE | Facility: CLINIC | Age: 79
End: 2024-05-06
Payer: MEDICARE

## 2024-05-06 DIAGNOSIS — K74.60 CIRRHOSIS OF LIVER WITH ASCITES, UNSPECIFIED HEPATIC CIRRHOSIS TYPE: Primary | Chronic | ICD-10-CM

## 2024-05-06 DIAGNOSIS — R18.8 CIRRHOSIS OF LIVER WITH ASCITES, UNSPECIFIED HEPATIC CIRRHOSIS TYPE: Primary | Chronic | ICD-10-CM

## 2024-05-06 NOTE — TELEPHONE ENCOUNTER
----- Message from Raymon Tyler MD sent at 5/6/2024 10:59 AM CDT -----  That is fine.  ----- Message -----  From: Gladys Hayden LPN  Sent: 5/6/2024  10:19 AM CDT  To: Raymon Tyler MD      ----- Message -----  From: Liz Herrera  Sent: 5/6/2024  10:07 AM CDT  To: Jayson BARNETT Staff    Pt is asking for a GI referral. Please call him back @ [phone]

## 2024-05-09 ENCOUNTER — DOCUMENT SCAN (OUTPATIENT)
Dept: HOME HEALTH SERVICES | Facility: HOSPITAL | Age: 79
End: 2024-05-09
Payer: MEDICARE

## 2024-05-16 ENCOUNTER — TELEPHONE (OUTPATIENT)
Dept: FAMILY MEDICINE | Facility: CLINIC | Age: 79
End: 2024-05-16
Payer: MEDICARE

## 2024-05-17 ENCOUNTER — DOCUMENT SCAN (OUTPATIENT)
Dept: HOME HEALTH SERVICES | Facility: HOSPITAL | Age: 79
End: 2024-05-17
Payer: MEDICARE

## 2024-05-21 ENCOUNTER — HOSPITAL ENCOUNTER (INPATIENT)
Facility: HOSPITAL | Age: 79
LOS: 2 days | Discharge: HOME HEALTH | DRG: 377 | End: 2024-05-23
Attending: EMERGENCY MEDICINE | Admitting: INTERNAL MEDICINE
Payer: MEDICARE

## 2024-05-21 DIAGNOSIS — I51.3 LV (LEFT VENTRICULAR) MURAL THROMBUS: ICD-10-CM

## 2024-05-21 DIAGNOSIS — I50.9 CHF (CONGESTIVE HEART FAILURE): ICD-10-CM

## 2024-05-21 DIAGNOSIS — F33.9 DEPRESSION, RECURRENT: Chronic | ICD-10-CM

## 2024-05-21 DIAGNOSIS — M54.6 ACUTE MIDLINE THORACIC BACK PAIN: ICD-10-CM

## 2024-05-21 DIAGNOSIS — I42.9 CARDIOMYOPATHY, UNSPECIFIED TYPE: ICD-10-CM

## 2024-05-21 DIAGNOSIS — R60.0 BILATERAL LOWER EXTREMITY EDEMA: Primary | ICD-10-CM

## 2024-05-21 DIAGNOSIS — R07.9 CHEST PAIN: ICD-10-CM

## 2024-05-21 DIAGNOSIS — E87.6 HYPOKALEMIA: ICD-10-CM

## 2024-05-21 DIAGNOSIS — D64.9 ANEMIA, UNSPECIFIED TYPE: ICD-10-CM

## 2024-05-21 DIAGNOSIS — I25.5 ISCHEMIC CARDIOMYOPATHY: ICD-10-CM

## 2024-05-21 DIAGNOSIS — K92.2 GASTROINTESTINAL HEMORRHAGE, UNSPECIFIED GASTROINTESTINAL HEMORRHAGE TYPE: ICD-10-CM

## 2024-05-21 LAB
ALBUMIN SERPL BCP-MCNC: 2.2 G/DL (ref 3.5–5)
ALBUMIN/GLOB SERPL: 0.6 {RATIO}
ALP SERPL-CCNC: 117 U/L (ref 45–115)
ALT SERPL W P-5'-P-CCNC: 21 U/L (ref 16–61)
ANION GAP SERPL CALCULATED.3IONS-SCNC: 9 MMOL/L (ref 7–16)
APTT PPP: 32 SECONDS (ref 25.2–37.3)
AST SERPL W P-5'-P-CCNC: 22 U/L (ref 15–37)
BASOPHILS # BLD AUTO: 0.01 K/UL (ref 0–0.2)
BASOPHILS NFR BLD AUTO: 0.3 % (ref 0–1)
BILIRUB SERPL-MCNC: 0.4 MG/DL (ref ?–1.2)
BILIRUB UR QL STRIP: NEGATIVE
BUN SERPL-MCNC: 23 MG/DL (ref 7–18)
BUN/CREAT SERPL: 20 (ref 6–20)
CALCIUM SERPL-MCNC: 8 MG/DL (ref 8.5–10.1)
CHLORIDE SERPL-SCNC: 105 MMOL/L (ref 98–107)
CLARITY UR: CLEAR
CO2 SERPL-SCNC: 30 MMOL/L (ref 21–32)
COLOR UR: COLORLESS
CREAT SERPL-MCNC: 1.14 MG/DL (ref 0.7–1.3)
DIFFERENTIAL METHOD BLD: ABNORMAL
EGFR (NO RACE VARIABLE) (RUSH/TITUS): 65 ML/MIN/1.73M2
EOSINOPHIL # BLD AUTO: 0.03 K/UL (ref 0–0.5)
EOSINOPHIL NFR BLD AUTO: 0.8 % (ref 1–4)
ERYTHROCYTE [DISTWIDTH] IN BLOOD BY AUTOMATED COUNT: 16.7 % (ref 11.5–14.5)
GLOBULIN SER-MCNC: 3.6 G/DL (ref 2–4)
GLUCOSE SERPL-MCNC: 110 MG/DL (ref 74–106)
GLUCOSE UR STRIP-MCNC: NORMAL MG/DL
HCT VFR BLD AUTO: 26.4 % (ref 40–54)
HCT VFR BLD AUTO: 28.2 % (ref 40–54)
HCT VFR BLD AUTO: 28.5 % (ref 40–54)
HGB BLD-MCNC: 8.3 G/DL (ref 13.5–18)
HGB BLD-MCNC: 8.9 G/DL (ref 13.5–18)
HGB BLD-MCNC: 9.1 G/DL (ref 13.5–18)
IMM GRANULOCYTES # BLD AUTO: 0.01 K/UL (ref 0–0.04)
IMM GRANULOCYTES NFR BLD: 0.3 % (ref 0–0.4)
INR BLD: 1.15
KETONES UR STRIP-SCNC: NEGATIVE MG/DL
LEUKOCYTE ESTERASE UR QL STRIP: NEGATIVE
LYMPHOCYTES # BLD AUTO: 1.02 K/UL (ref 1–4.8)
LYMPHOCYTES NFR BLD AUTO: 26.7 % (ref 27–41)
MAGNESIUM SERPL-MCNC: 2.1 MG/DL (ref 1.7–2.3)
MCH RBC QN AUTO: 31.1 PG (ref 27–31)
MCHC RBC AUTO-ENTMCNC: 31.4 G/DL (ref 32–36)
MCV RBC AUTO: 98.9 FL (ref 80–96)
MONOCYTES # BLD AUTO: 0.44 K/UL (ref 0–0.8)
MONOCYTES NFR BLD AUTO: 11.5 % (ref 2–6)
MPC BLD CALC-MCNC: 10.5 FL (ref 9.4–12.4)
NEUTROPHILS # BLD AUTO: 2.31 K/UL (ref 1.8–7.7)
NEUTROPHILS NFR BLD AUTO: 60.4 % (ref 53–65)
NITRITE UR QL STRIP: NEGATIVE
NRBC # BLD AUTO: 0 X10E3/UL
NRBC, AUTO (.00): 0 %
NT-PROBNP SERPL-MCNC: 2491 PG/ML (ref 1–450)
OHS QRS DURATION: 114 MS
OHS QTC CALCULATION: 468 MS
PH UR STRIP: 5.5 PH UNITS
PLATELET # BLD AUTO: 113 K/UL (ref 150–400)
POC OCCULT BLOOD STOOL: POSITIVE
POTASSIUM SERPL-SCNC: 3.2 MMOL/L (ref 3.5–5.1)
PROT SERPL-MCNC: 5.8 G/DL (ref 6.4–8.2)
PROT UR QL STRIP: NEGATIVE
PROTHROMBIN TIME: 14.6 SECONDS (ref 11.7–14.7)
RBC # BLD AUTO: 2.67 M/UL (ref 4.6–6.2)
RBC # UR STRIP: NEGATIVE /UL
SARS-COV-2 RDRP RESP QL NAA+PROBE: NEGATIVE
SODIUM SERPL-SCNC: 141 MMOL/L (ref 136–145)
SP GR UR STRIP: 1
TROPONIN I SERPL DL<=0.01 NG/ML-MCNC: 30.3 PG/ML
UROBILINOGEN UR STRIP-ACNC: NORMAL MG/DL
WBC # BLD AUTO: 3.82 K/UL (ref 4.5–11)

## 2024-05-21 PROCEDURE — 96374 THER/PROPH/DIAG INJ IV PUSH: CPT

## 2024-05-21 PROCEDURE — 85610 PROTHROMBIN TIME: CPT

## 2024-05-21 PROCEDURE — 25500020 PHARM REV CODE 255: Performed by: INTERNAL MEDICINE

## 2024-05-21 PROCEDURE — 25000003 PHARM REV CODE 250: Performed by: INTERNAL MEDICINE

## 2024-05-21 PROCEDURE — 36415 COLL VENOUS BLD VENIPUNCTURE: CPT | Performed by: INTERNAL MEDICINE

## 2024-05-21 PROCEDURE — 81003 URINALYSIS AUTO W/O SCOPE: CPT

## 2024-05-21 PROCEDURE — 85730 THROMBOPLASTIN TIME PARTIAL: CPT

## 2024-05-21 PROCEDURE — 63600175 PHARM REV CODE 636 W HCPCS: Performed by: INTERNAL MEDICINE

## 2024-05-21 PROCEDURE — 84484 ASSAY OF TROPONIN QUANT: CPT

## 2024-05-21 PROCEDURE — 25000003 PHARM REV CODE 250

## 2024-05-21 PROCEDURE — 85014 HEMATOCRIT: CPT | Performed by: INTERNAL MEDICINE

## 2024-05-21 PROCEDURE — 99285 EMERGENCY DEPT VISIT HI MDM: CPT | Mod: ,,, | Performed by: EMERGENCY MEDICINE

## 2024-05-21 PROCEDURE — C9113 INJ PANTOPRAZOLE SODIUM, VIA: HCPCS | Performed by: INTERNAL MEDICINE

## 2024-05-21 PROCEDURE — 85018 HEMOGLOBIN: CPT | Performed by: INTERNAL MEDICINE

## 2024-05-21 PROCEDURE — 99285 EMERGENCY DEPT VISIT HI MDM: CPT | Mod: 25

## 2024-05-21 PROCEDURE — 63600175 PHARM REV CODE 636 W HCPCS

## 2024-05-21 PROCEDURE — C9113 INJ PANTOPRAZOLE SODIUM, VIA: HCPCS

## 2024-05-21 PROCEDURE — 94761 N-INVAS EAR/PLS OXIMETRY MLT: CPT

## 2024-05-21 PROCEDURE — 94640 AIRWAY INHALATION TREATMENT: CPT

## 2024-05-21 PROCEDURE — 99223 1ST HOSP IP/OBS HIGH 75: CPT | Mod: ,,, | Performed by: INTERNAL MEDICINE

## 2024-05-21 PROCEDURE — 36415 COLL VENOUS BLD VENIPUNCTURE: CPT

## 2024-05-21 PROCEDURE — 25000242 PHARM REV CODE 250 ALT 637 W/ HCPCS: Performed by: INTERNAL MEDICINE

## 2024-05-21 PROCEDURE — 80053 COMPREHEN METABOLIC PANEL: CPT

## 2024-05-21 PROCEDURE — 96375 TX/PRO/DX INJ NEW DRUG ADDON: CPT

## 2024-05-21 PROCEDURE — 93005 ELECTROCARDIOGRAM TRACING: CPT

## 2024-05-21 PROCEDURE — 83880 ASSAY OF NATRIURETIC PEPTIDE: CPT

## 2024-05-21 PROCEDURE — 83735 ASSAY OF MAGNESIUM: CPT

## 2024-05-21 PROCEDURE — 93010 ELECTROCARDIOGRAM REPORT: CPT | Mod: ,,, | Performed by: HOSPITALIST

## 2024-05-21 PROCEDURE — 11000001 HC ACUTE MED/SURG PRIVATE ROOM

## 2024-05-21 PROCEDURE — 82272 OCCULT BLD FECES 1-3 TESTS: CPT

## 2024-05-21 PROCEDURE — 85025 COMPLETE CBC W/AUTO DIFF WBC: CPT

## 2024-05-21 PROCEDURE — 87635 SARS-COV-2 COVID-19 AMP PRB: CPT

## 2024-05-21 PROCEDURE — 99223 1ST HOSP IP/OBS HIGH 75: CPT | Mod: AI,,, | Performed by: INTERNAL MEDICINE

## 2024-05-21 RX ORDER — PANTOPRAZOLE SODIUM 40 MG/10ML
40 INJECTION, POWDER, LYOPHILIZED, FOR SOLUTION INTRAVENOUS 2 TIMES DAILY
Status: DISCONTINUED | OUTPATIENT
Start: 2024-05-21 | End: 2024-05-23 | Stop reason: HOSPADM

## 2024-05-21 RX ORDER — ALUMINUM HYDROXIDE, MAGNESIUM HYDROXIDE, AND SIMETHICONE 1200; 120; 1200 MG/30ML; MG/30ML; MG/30ML
30 SUSPENSION ORAL 4 TIMES DAILY PRN
Status: DISCONTINUED | OUTPATIENT
Start: 2024-05-21 | End: 2024-05-22

## 2024-05-21 RX ORDER — FUROSEMIDE 10 MG/ML
40 INJECTION INTRAMUSCULAR; INTRAVENOUS
Status: COMPLETED | OUTPATIENT
Start: 2024-05-21 | End: 2024-05-21

## 2024-05-21 RX ORDER — SIMETHICONE 80 MG
1 TABLET,CHEWABLE ORAL 4 TIMES DAILY PRN
Status: DISCONTINUED | OUTPATIENT
Start: 2024-05-21 | End: 2024-05-23 | Stop reason: HOSPADM

## 2024-05-21 RX ORDER — AMOXICILLIN 250 MG
1 CAPSULE ORAL 2 TIMES DAILY
Status: DISCONTINUED | OUTPATIENT
Start: 2024-05-21 | End: 2024-05-23 | Stop reason: HOSPADM

## 2024-05-21 RX ORDER — ALBUTEROL SULFATE 90 UG/1
2 AEROSOL, METERED RESPIRATORY (INHALATION) EVERY 4 HOURS PRN
Status: DISCONTINUED | OUTPATIENT
Start: 2024-05-21 | End: 2024-05-21

## 2024-05-21 RX ORDER — BUDESONIDE 0.5 MG/2ML
0.5 INHALANT ORAL EVERY 12 HOURS
Status: DISCONTINUED | OUTPATIENT
Start: 2024-05-21 | End: 2024-05-23 | Stop reason: HOSPADM

## 2024-05-21 RX ORDER — SERTRALINE HYDROCHLORIDE 25 MG/1
25 TABLET, FILM COATED ORAL DAILY
Status: DISCONTINUED | OUTPATIENT
Start: 2024-05-22 | End: 2024-05-23 | Stop reason: HOSPADM

## 2024-05-21 RX ORDER — POTASSIUM CHLORIDE 20 MEQ/1
40 TABLET, EXTENDED RELEASE ORAL ONCE
Status: COMPLETED | OUTPATIENT
Start: 2024-05-21 | End: 2024-05-21

## 2024-05-21 RX ORDER — BISACODYL 10 MG/1
10 SUPPOSITORY RECTAL DAILY PRN
Status: DISCONTINUED | OUTPATIENT
Start: 2024-05-21 | End: 2024-05-23 | Stop reason: HOSPADM

## 2024-05-21 RX ORDER — ALBUTEROL SULFATE 0.83 MG/ML
2.5 SOLUTION RESPIRATORY (INHALATION) EVERY 6 HOURS
Status: DISCONTINUED | OUTPATIENT
Start: 2024-05-21 | End: 2024-05-23 | Stop reason: HOSPADM

## 2024-05-21 RX ORDER — NALOXONE HCL 0.4 MG/ML
0.02 VIAL (ML) INJECTION
Status: DISCONTINUED | OUTPATIENT
Start: 2024-05-21 | End: 2024-05-23 | Stop reason: HOSPADM

## 2024-05-21 RX ORDER — ACETAMINOPHEN 325 MG/1
650 TABLET ORAL EVERY 4 HOURS PRN
Status: DISCONTINUED | OUTPATIENT
Start: 2024-05-21 | End: 2024-05-23 | Stop reason: HOSPADM

## 2024-05-21 RX ORDER — ONDANSETRON HYDROCHLORIDE 2 MG/ML
4 INJECTION, SOLUTION INTRAVENOUS EVERY 8 HOURS PRN
Status: DISCONTINUED | OUTPATIENT
Start: 2024-05-21 | End: 2024-05-23 | Stop reason: HOSPADM

## 2024-05-21 RX ORDER — ACETAMINOPHEN 325 MG/1
650 TABLET ORAL EVERY 8 HOURS PRN
Status: DISCONTINUED | OUTPATIENT
Start: 2024-05-21 | End: 2024-05-23 | Stop reason: HOSPADM

## 2024-05-21 RX ORDER — ATORVASTATIN CALCIUM 40 MG/1
40 TABLET, FILM COATED ORAL NIGHTLY
Status: DISCONTINUED | OUTPATIENT
Start: 2024-05-21 | End: 2024-05-23 | Stop reason: HOSPADM

## 2024-05-21 RX ORDER — POLYETHYLENE GLYCOL 3350 17 G/17G
17 POWDER, FOR SOLUTION ORAL DAILY
Status: DISCONTINUED | OUTPATIENT
Start: 2024-05-21 | End: 2024-05-23 | Stop reason: HOSPADM

## 2024-05-21 RX ORDER — FUROSEMIDE 10 MG/ML
60 INJECTION INTRAMUSCULAR; INTRAVENOUS EVERY 12 HOURS
Status: DISCONTINUED | OUTPATIENT
Start: 2024-05-21 | End: 2024-05-23 | Stop reason: HOSPADM

## 2024-05-21 RX ORDER — SPIRONOLACTONE 25 MG/1
50 TABLET ORAL DAILY
Status: DISCONTINUED | OUTPATIENT
Start: 2024-05-21 | End: 2024-05-23 | Stop reason: HOSPADM

## 2024-05-21 RX ORDER — TALC
6 POWDER (GRAM) TOPICAL NIGHTLY PRN
Status: DISCONTINUED | OUTPATIENT
Start: 2024-05-21 | End: 2024-05-23 | Stop reason: HOSPADM

## 2024-05-21 RX ORDER — ALBUTEROL SULFATE 0.83 MG/ML
2.5 SOLUTION RESPIRATORY (INHALATION) EVERY 4 HOURS PRN
Status: DISCONTINUED | OUTPATIENT
Start: 2024-05-21 | End: 2024-05-23 | Stop reason: HOSPADM

## 2024-05-21 RX ORDER — IBUPROFEN 200 MG
24 TABLET ORAL
Status: DISCONTINUED | OUTPATIENT
Start: 2024-05-21 | End: 2024-05-23 | Stop reason: HOSPADM

## 2024-05-21 RX ORDER — BUDESONIDE AND FORMOTEROL FUMARATE DIHYDRATE 160; 4.5 UG/1; UG/1
2 AEROSOL RESPIRATORY (INHALATION) EVERY 12 HOURS
Status: DISCONTINUED | OUTPATIENT
Start: 2024-05-21 | End: 2024-05-21

## 2024-05-21 RX ORDER — GLUCAGON 1 MG
1 KIT INJECTION
Status: DISCONTINUED | OUTPATIENT
Start: 2024-05-21 | End: 2024-05-23 | Stop reason: HOSPADM

## 2024-05-21 RX ORDER — IBUPROFEN 200 MG
16 TABLET ORAL
Status: DISCONTINUED | OUTPATIENT
Start: 2024-05-21 | End: 2024-05-23 | Stop reason: HOSPADM

## 2024-05-21 RX ADMIN — ATORVASTATIN CALCIUM 40 MG: 40 TABLET, FILM COATED ORAL at 09:05

## 2024-05-21 RX ADMIN — FUROSEMIDE 40 MG: 10 INJECTION, SOLUTION INTRAMUSCULAR; INTRAVENOUS at 01:05

## 2024-05-21 RX ADMIN — POLYETHYLENE GLYCOL 3350 17 G: 17 POWDER, FOR SOLUTION ORAL at 06:05

## 2024-05-21 RX ADMIN — PERFLUTREN 1.5 ML: 6.52 INJECTION, SUSPENSION INTRAVENOUS at 04:05

## 2024-05-21 RX ADMIN — SPIRONOLACTONE 50 MG: 25 TABLET ORAL at 06:05

## 2024-05-21 RX ADMIN — PANTOPRAZOLE SODIUM 40 MG: 40 INJECTION, POWDER, LYOPHILIZED, FOR SOLUTION INTRAVENOUS at 09:05

## 2024-05-21 RX ADMIN — FUROSEMIDE 60 MG: 10 INJECTION, SOLUTION INTRAMUSCULAR; INTRAVENOUS at 09:05

## 2024-05-21 RX ADMIN — BUDESONIDE INHALATION 0.5 MG: 0.5 SUSPENSION RESPIRATORY (INHALATION) at 07:05

## 2024-05-21 RX ADMIN — PANTOPRAZOLE SODIUM 40 MG: 40 INJECTION, POWDER, LYOPHILIZED, FOR SOLUTION INTRAVENOUS at 11:05

## 2024-05-21 RX ADMIN — ALBUTEROL SULFATE 2.5 MG: 2.5 SOLUTION RESPIRATORY (INHALATION) at 07:05

## 2024-05-21 RX ADMIN — POTASSIUM CHLORIDE 40 MEQ: 1500 TABLET, EXTENDED RELEASE ORAL at 12:05

## 2024-05-21 NOTE — ASSESSMENT & PLAN NOTE
Was recently diagnosed and tapered to elequis 2.5 mg bid per wife  However d/t drop in hb 10>8.3, will hold off to any AC pending GI evaluation, Cardiology on board, will get repeat echo.

## 2024-05-21 NOTE — PHARMACY MED REC
"Admission Medication History     The home medication history was taken by Ju Peng.    You may go to "Admission" then "Reconcile Home Medications" tabs to review and/or act upon these items.     The home medication list has been updated by the Pharmacy department.   Please read ALL comments highlighted in yellow.   Please address this information as you see fit.    Feel free to contact us if you have any questions or require assistance.  Medications Updated:  Creon 24,000-76,000-120,000 unit  Patient's wife stated he's only taking it once right now while taking the iron supplement  Ferrous Sulfate 325 mg  Patient's wife stated he's only taking it once right now  Sertraline 25 mg  Patient taking 12.5 mg nightly    Patient reports no longer taking the following medication(s):  Farxiga 10 mg  Omeprazole 40 mg  Memantine 5 mg  Cyanocobalamin 1,000 mcg/ml    Medications listed below were obtained from: Patient/family and Analytic software- [employer]  (Not in a hospital admission)        Current Outpatient Medications on File Prior to Encounter   Medication Sig Dispense Refill Last Dose    albuterol (PROVENTIL) 2.5 mg /3 mL (0.083 %) nebulizer solution Take 3 mLs (2.5 mg total) by nebulization as needed for Wheezing or Shortness of Breath. 300 mL 5 Past Week    apixaban (ELIQUIS) 2.5 mg Tab Take 2.5 mg by mouth 2 (two) times daily.   5/21/2024 at AM    atorvastatin (LIPITOR) 40 MG tablet Take 1 tablet (40 mg total) by mouth once daily. For CHOLESTEROL (Patient taking differently: Take 40 mg by mouth every evening. For CHOLESTEROL) 90 tablet 1 5/20/2024    BREO ELLIPTA 200-25 mcg/dose DsDv diskus inhaler 1 puff once daily.   5/21/2024 at AM    calcium carbonate/vitamin D3 (CALTRATE 600 + D ORAL) Take 1 capsule by mouth Daily.   5/21/2024 at AM    CREON 24,000-76,000 -120,000 unit capsule Take 1 capsule by mouth 3 (three) times daily.   5/21/2024    FEROSUL 325 mg (65 mg iron) Tab tablet Take 1 tablet by mouth 3 (three) " times daily.   5/21/2024    furosemide (LASIX) 40 MG tablet Take 40 mg by mouth 2 (two) times daily.   5/21/2024 at AM    HYDROcodone-acetaminophen (NORCO) 7.5-325 mg per tablet Take 1 tablet by mouth every 6 (six) hours as needed for Pain. 28 tablet 0 Past Week    LORazepam (ATIVAN) 0.5 MG tablet Take 1 tablet (0.5 mg total) by mouth every 12 (twelve) hours as needed (for ANXIETY and AGITATION). 30 tablet 1 5/21/2024 at AM    magnesium oxide (MAG-OX) 400 mg (241.3 mg magnesium) tablet Take 1 tablet by mouth every morning.   5/21/2024    ondansetron (ZOFRAN-ODT) 4 MG TbDL Take 1 tablet (4 mg total) by mouth every 8 (eight) hours as needed (NAUSEA). 30 tablet 2 Past Week    sacubitriL-valsartan (ENTRESTO) 24-26 mg per tablet Take 1 tablet by mouth 2 (two) times daily. 28 tablet 1 5/21/2024 at AM    sertraline (ZOLOFT) 25 MG tablet Take 1 tablet (25 mg total) by mouth once daily. 90 tablet 1 5/20/2024    spironolactone (ALDACTONE) 50 MG tablet Take 50 mg by mouth Daily.   5/21/2024    albuterol (PROVENTIL/VENTOLIN HFA) 90 mcg/actuation inhaler 2 puffs as needed   Unknown    cyanocobalamin 1,000 mcg/mL injection Inject 1,000 mcg into the muscle every 30 days.       dapagliflozin propanediol (FARXIGA) 10 mg tablet Take 1 tablet (10 mg total) by mouth once daily. 30 tablet 2     dapagliflozin propanediol (FARXIGA) 10 mg tablet Take 1 tablet (10 mg total) by mouth once daily. 7 tablet 2     EScitalopram oxalate (LEXAPRO) 10 MG tablet Take 10 mg by mouth once daily.   Unknown    omeprazole (PRILOSEC) 40 MG capsule Take 1 capsule (40 mg total) by mouth 2 (two) times daily before meals. For STOMACH 180 capsule 1     sacubitriL-valsartan (ENTRESTO) 24-26 mg per tablet Take 1 tablet by mouth 2 (two) times daily. 60 tablet 1          Potential issues to be addressed PRIOR TO DISCHARGE  Patient reported not taking the following medications: (Memantine 5 mg, Omeprazole 40 mg, Farxiga 10 mg, Cyanocobalamin 1,000 mcg/ml). These  medications remain on the home medication list. Please address accordingly.   Patient requested refills for the following medications: (Creon, Dimasreo Ellipta 200-25 mcg, Spironolactone 50 mg)    Ju Peng  [facility] - Medication History  [id number]. [id number]      .

## 2024-05-21 NOTE — ASSESSMENT & PLAN NOTE
3/29/24 Echo   Normal LV size with severely reduced systolic function.  Estimated LVEF   25%.   There is akinesis of the mid to apical anteroseptal wall and entire apex.   There is a small apical thrombus on contrasted images.   No significant valvular abnormalities.   Moderate biatrial enlargement.     Compared to prior study in July 2021, there has been little change.    Potential apical thrombus mentioned on that study as well.     Left Ventricle   Normal wall thickness observed. Moderate to severe (25-30%) ejection fraction. Left ventricular diastolic dysfunction. There is mid to apical anteroseptal wall and apical akinesis. There is a probable small thrombus present. The thrombus is located in the apex.     Right Ventricle   The right ventricular cavity size is normal. Low normal systolic function.     Left Atrium   Left atrium is moderately dilated.     Right Atrium   Right atrium is moderately dilated.     IVC/SVC   Normal central venous pressure (0-5mm Hg).     Mitral Valve   There is mild valve leaflet calcification. No stenosis. Trace mitral regurgitation.     Tricuspid Valve   Tricsupid valve appears grossly normal. Trace tricuspid regurgitation. Pulmonary hypertension absent. RVSP is 28.00.     Aortic Valve   The valve is tricuspid. There is calcification with reduced excursion of the aortic valve present.No aortic stenosis. No aortic regurgitation.     Pulmonic Valve   Normal valve structure. Trace transvalvular regurgitation. No stenosis.     Ascending Aorta   The sinus of Valsalva is normal. The sinotubular junction is normal. The ascending aorta is not well visualized.     Pericardium   No pericardial effusion.     >continue GDMT  >Pt w/ elevation in bnp, though lower than previous value  >consult cardiology  >start iv lasix 60 mg bid

## 2024-05-21 NOTE — ASSESSMENT & PLAN NOTE
Chronic, controlled. Latest blood pressure and vitals reviewed-     Temp:  [97.9 °F (36.6 °C)]   Pulse:  [67-96]   Resp:  [12-18]   BP: ()/(45-58)   SpO2:  [96 %] .   Home meds for hypertension were reviewed and noted below.   Hypertension Medications               furosemide (LASIX) 40 MG tablet Take 40 mg by mouth 2 (two) times daily.    sacubitriL-valsartan (ENTRESTO) 24-26 mg per tablet Take 1 tablet by mouth 2 (two) times daily.    spironolactone (ALDACTONE) 50 MG tablet Take 50 mg by mouth Daily.    sacubitriL-valsartan (ENTRESTO) 24-26 mg per tablet Take 1 tablet by mouth 2 (two) times daily.            While in the hospital, will manage blood pressure as follows; Continue home antihypertensive regimen    Will utilize p.r.n. blood pressure medication only if patient's blood pressure greater than 180/110 and he develops symptoms such as worsening chest pain or shortness of breath.

## 2024-05-21 NOTE — Clinical Note
Diagnosis: Bilateral lower extremity edema [345178]   Future Attending Provider: EMELIA ALVAREZ [[id number]]   Reason for IP Medical Treatment  (Clinical interventions that can only be accomplished in the IP setting? ) :: GI Bleed   I certify that Inpatient services for greater than or equal to 2 midnights are medically necessary:: Yes   Plans for Post-Acute care--if anticipated (pick the single best option):: A. No post acute care anticipated at this time   Special Needs:: No Special Needs [1]

## 2024-05-21 NOTE — SUBJECTIVE & OBJECTIVE
Past Medical History:   Diagnosis Date    Aortic heart murmur     Asthma     Cancer     Deep vein thrombosis     Emphysema/COPD     Hyperlipidemia     Ischemic cardiomyopathy     Mitral regurgitation     Old myocardial infarction 03/2001    Tricuspid regurgitation     Vitamin B 12 deficiency     Vitamin D deficiency        Past Surgical History:   Procedure Laterality Date    CARDIAC CATHETERIZATION      CARDIAC DEFIBRILLATOR PLACEMENT      CHOLECYSTECTOMY      CORONARY ARTERY BYPASS GRAFT      TOTAL KNEE ARTHROPLASTY Bilateral        Review of patient's allergies indicates:   Allergen Reactions    Hay fever and allergy relief Rash    Pollen extracts Rash       No current facility-administered medications on file prior to encounter.     Current Outpatient Medications on File Prior to Encounter   Medication Sig    albuterol (PROVENTIL) 2.5 mg /3 mL (0.083 %) nebulizer solution Take 3 mLs (2.5 mg total) by nebulization as needed for Wheezing or Shortness of Breath.    apixaban (ELIQUIS) 2.5 mg Tab Take 2.5 mg by mouth 2 (two) times daily.    atorvastatin (LIPITOR) 40 MG tablet Take 1 tablet (40 mg total) by mouth once daily. For CHOLESTEROL (Patient taking differently: Take 40 mg by mouth every evening. For CHOLESTEROL)    BREO ELLIPTA 200-25 mcg/dose DsDv diskus inhaler 1 puff once daily.    calcium carbonate/vitamin D3 (CALTRATE 600 + D ORAL) Take 1 capsule by mouth Daily.    CREON 24,000-76,000 -120,000 unit capsule Take 1 capsule by mouth 3 (three) times daily.    FEROSUL 325 mg (65 mg iron) Tab tablet Take 1 tablet by mouth 3 (three) times daily.    furosemide (LASIX) 40 MG tablet Take 40 mg by mouth 2 (two) times daily.    HYDROcodone-acetaminophen (NORCO) 7.5-325 mg per tablet Take 1 tablet by mouth every 6 (six) hours as needed for Pain.    LORazepam (ATIVAN) 0.5 MG tablet Take 1 tablet (0.5 mg total) by mouth every 12 (twelve) hours as needed (for ANXIETY and AGITATION).    magnesium oxide (MAG-OX) 400 mg  (241.3 mg magnesium) tablet Take 1 tablet by mouth every morning.    ondansetron (ZOFRAN-ODT) 4 MG TbDL Take 1 tablet (4 mg total) by mouth every 8 (eight) hours as needed (NAUSEA).    sacubitriL-valsartan (ENTRESTO) 24-26 mg per tablet Take 1 tablet by mouth 2 (two) times daily.    sertraline (ZOLOFT) 25 MG tablet Take 1 tablet (25 mg total) by mouth once daily. (Patient taking differently: Take 12.5 mg by mouth once daily.)    spironolactone (ALDACTONE) 50 MG tablet Take 50 mg by mouth Daily.    albuterol (PROVENTIL/VENTOLIN HFA) 90 mcg/actuation inhaler 2 puffs as needed    cyanocobalamin 1,000 mcg/mL injection Inject 1,000 mcg into the muscle every 30 days.    dapagliflozin propanediol (FARXIGA) 10 mg tablet Take 1 tablet (10 mg total) by mouth once daily.    dapagliflozin propanediol (FARXIGA) 10 mg tablet Take 1 tablet (10 mg total) by mouth once daily.    EScitalopram oxalate (LEXAPRO) 10 MG tablet Take 10 mg by mouth once daily.    omeprazole (PRILOSEC) 40 MG capsule Take 1 capsule (40 mg total) by mouth 2 (two) times daily before meals. For STOMACH    sacubitriL-valsartan (ENTRESTO) 24-26 mg per tablet Take 1 tablet by mouth 2 (two) times daily.     Family History       Problem Relation (Age of Onset)    Emphysema Father          Tobacco Use    Smoking status: Never     Passive exposure: Never    Smokeless tobacco: Never   Substance and Sexual Activity    Alcohol use: Never    Drug use: Never    Sexual activity: Yes     Partners: Female     Review of Systems   Constitutional:  Positive for fatigue. Negative for chills and fever.   HENT: Negative.  Negative for congestion and rhinorrhea.    Respiratory:  Negative for apnea, cough, chest tightness and shortness of breath.    Cardiovascular: Negative.  Negative for chest pain and leg swelling.   Gastrointestinal:  Positive for blood in stool. Negative for abdominal pain, diarrhea, nausea and vomiting.   Endocrine: Negative.    Genitourinary: Negative.     Musculoskeletal:  Positive for arthralgias.   Skin: Negative.    Allergic/Immunologic: Negative.    Neurological:  Positive for weakness.   Hematological: Negative.    Psychiatric/Behavioral: Negative.       Objective:     Vital Signs (Most Recent):  Temp: 97.9 °F (36.6 °C) (05/21/24 0950)  Pulse: 67 (05/21/24 1230)  Resp: 12 (05/21/24 1230)  BP: (!) 104/58 (05/21/24 1230)  SpO2: 96 % (05/21/24 0950) Vital Signs (24h Range):  Temp:  [97.9 °F (36.6 °C)] 97.9 °F (36.6 °C)  Pulse:  [67-96] 67  Resp:  [12-18] 12  SpO2:  [96 %] 96 %  BP: ()/(45-58) 104/58     Weight: 92.5 kg (204 lb)  Body mass index is 27.67 kg/m².     Physical Exam  Vitals reviewed.   Constitutional:       General: He is not in acute distress.     Appearance: Normal appearance. He is not ill-appearing.   HENT:      Head: Normocephalic and atraumatic.      Nose: Nose normal. No congestion.      Mouth/Throat:      Pharynx: Oropharynx is clear.   Eyes:      Extraocular Movements: Extraocular movements intact.      Conjunctiva/sclera: Conjunctivae normal.      Pupils: Pupils are equal, round, and reactive to light.   Cardiovascular:      Rate and Rhythm: Normal rate and regular rhythm.      Pulses: Normal pulses.      Heart sounds: Murmur heard.   Pulmonary:      Effort: Pulmonary effort is normal. No respiratory distress.      Comments: Basilar crackles  Abdominal:      General: Bowel sounds are normal. There is no distension.      Palpations: Abdomen is soft.      Tenderness: There is no abdominal tenderness.   Musculoskeletal:         General: No tenderness.      Right lower leg: Edema present.      Left lower leg: Edema present.   Lymphadenopathy:      Cervical: No cervical adenopathy.   Skin:     General: Skin is warm and dry.   Neurological:      Mental Status: He is alert and oriented to person, place, and time. Mental status is at baseline.   Psychiatric:         Mood and Affect: Mood normal.              CRANIAL NERVES     CN III, IV, VI    Pupils are equal, round, and reactive to light.       Significant Labs: All pertinent labs within the past 24 hours have been reviewed.    Significant Imaging: I have reviewed all pertinent imaging results/findings within the past 24 hours.

## 2024-05-21 NOTE — HPI
78 y.o. male with PMH significant for anemia, asthma, previous CVA, chronic systolic CHF (with EF of 25% and apical thrombus on most recent ECHO) and history of pancreatic cancer s/p Whipple procedure as well as CAD s/p CABG, AICD, who presents to the hospital with worsening lower extremity edema.  Patient is accompanied by his wife, patient and wife state that he has been having increasing lower extremity swelling without any shortness of breath.  Patient has had multiple hospitalizations for the same reason, he endorses complete compliance to his medications.  He also endorses with complete compliance to dietary and fluid restriction.  Of note patient and wife mentioned that he has been having red stools along with clots off and on, he had his last bowel movement yesterday which did not include any bleeding.  However fecal occult done by the ER staff was positive.  Patient will be admitted for lower extremity edema and workup of GI bleeding.

## 2024-05-21 NOTE — H&P
[facility] - Emergency Department  Hospital Medicine  History & Physical    Patient Name: Tiny Gutierrez  MRN: [id number]  Patient Class: IP- Inpatient  Admission Date: 5/21/2024  Attending Physician: Bebeto Flanagan MD   Primary Care Provider: Raymon Tyler MD         Patient information was obtained from patient, spouse/SO, past medical records, and ER records.     Subjective:     Principal Problem:Ischemic cardiomyopathy    Chief Complaint:   Chief Complaint   Patient presents with    Leg Swelling     Bilateral        HPI: 78 y.o. male with PMH significant for anemia, asthma, previous CVA, chronic systolic CHF (with EF of 25% and apical thrombus on most recent ECHO) and history of pancreatic cancer s/p Whipple procedure as well as CAD s/p CABG, AICD, who presents to the hospital with worsening lower extremity edema.  Patient is accompanied by his wife, patient and wife state that he has been having increasing lower extremity swelling without any shortness of breath.  Patient has had multiple hospitalizations for the same reason, he endorses complete compliance to his medications.  He also endorses with complete compliance to dietary and fluid restriction.  Of note patient and wife mentioned that he has been having red stools along with clots off and on, he had his last bowel movement yesterday which did not include any bleeding.  However fecal occult done by the ER staff was positive.  Patient will be admitted for lower extremity edema and workup of GI bleeding.    Past Medical History:   Diagnosis Date    Aortic heart murmur     Asthma     Cancer     Deep vein thrombosis     Emphysema/COPD     Hyperlipidemia     Ischemic cardiomyopathy     Mitral regurgitation     Old myocardial infarction 03/2001    Tricuspid regurgitation     Vitamin B 12 deficiency     Vitamin D deficiency        Past Surgical History:   Procedure Laterality Date    CARDIAC CATHETERIZATION      CARDIAC DEFIBRILLATOR  PLACEMENT      CHOLECYSTECTOMY      CORONARY ARTERY BYPASS GRAFT      TOTAL KNEE ARTHROPLASTY Bilateral        Review of patient's allergies indicates:   Allergen Reactions    Hay fever and allergy relief Rash    Pollen extracts Rash       No current facility-administered medications on file prior to encounter.     Current Outpatient Medications on File Prior to Encounter   Medication Sig    albuterol (PROVENTIL) 2.5 mg /3 mL (0.083 %) nebulizer solution Take 3 mLs (2.5 mg total) by nebulization as needed for Wheezing or Shortness of Breath.    apixaban (ELIQUIS) 2.5 mg Tab Take 2.5 mg by mouth 2 (two) times daily.    atorvastatin (LIPITOR) 40 MG tablet Take 1 tablet (40 mg total) by mouth once daily. For CHOLESTEROL (Patient taking differently: Take 40 mg by mouth every evening. For CHOLESTEROL)    BREO ELLIPTA 200-25 mcg/dose DsDv diskus inhaler 1 puff once daily.    calcium carbonate/vitamin D3 (CALTRATE 600 + D ORAL) Take 1 capsule by mouth Daily.    CREON 24,000-76,000 -120,000 unit capsule Take 1 capsule by mouth 3 (three) times daily.    FEROSUL 325 mg (65 mg iron) Tab tablet Take 1 tablet by mouth 3 (three) times daily.    furosemide (LASIX) 40 MG tablet Take 40 mg by mouth 2 (two) times daily.    HYDROcodone-acetaminophen (NORCO) 7.5-325 mg per tablet Take 1 tablet by mouth every 6 (six) hours as needed for Pain.    LORazepam (ATIVAN) 0.5 MG tablet Take 1 tablet (0.5 mg total) by mouth every 12 (twelve) hours as needed (for ANXIETY and AGITATION).    magnesium oxide (MAG-OX) 400 mg (241.3 mg magnesium) tablet Take 1 tablet by mouth every morning.    ondansetron (ZOFRAN-ODT) 4 MG TbDL Take 1 tablet (4 mg total) by mouth every 8 (eight) hours as needed (NAUSEA).    sacubitriL-valsartan (ENTRESTO) 24-26 mg per tablet Take 1 tablet by mouth 2 (two) times daily.    sertraline (ZOLOFT) 25 MG tablet Take 1 tablet (25 mg total) by mouth once daily. (Patient taking differently: Take 12.5 mg by mouth once daily.)     spironolactone (ALDACTONE) 50 MG tablet Take 50 mg by mouth Daily.    albuterol (PROVENTIL/VENTOLIN HFA) 90 mcg/actuation inhaler 2 puffs as needed    cyanocobalamin 1,000 mcg/mL injection Inject 1,000 mcg into the muscle every 30 days.    dapagliflozin propanediol (FARXIGA) 10 mg tablet Take 1 tablet (10 mg total) by mouth once daily.    dapagliflozin propanediol (FARXIGA) 10 mg tablet Take 1 tablet (10 mg total) by mouth once daily.    EScitalopram oxalate (LEXAPRO) 10 MG tablet Take 10 mg by mouth once daily.    omeprazole (PRILOSEC) 40 MG capsule Take 1 capsule (40 mg total) by mouth 2 (two) times daily before meals. For STOMACH    sacubitriL-valsartan (ENTRESTO) 24-26 mg per tablet Take 1 tablet by mouth 2 (two) times daily.     Family History       Problem Relation (Age of Onset)    Emphysema Father          Tobacco Use    Smoking status: Never     Passive exposure: Never    Smokeless tobacco: Never   Substance and Sexual Activity    Alcohol use: Never    Drug use: Never    Sexual activity: Yes     Partners: Female     Review of Systems   Constitutional:  Positive for fatigue. Negative for chills and fever.   HENT: Negative.  Negative for congestion and rhinorrhea.    Respiratory:  Negative for apnea, cough, chest tightness and shortness of breath.    Cardiovascular: Negative.  Negative for chest pain and leg swelling.   Gastrointestinal:  Positive for blood in stool. Negative for abdominal pain, diarrhea, nausea and vomiting.   Endocrine: Negative.    Genitourinary: Negative.    Musculoskeletal:  Positive for arthralgias.   Skin: Negative.    Allergic/Immunologic: Negative.    Neurological:  Positive for weakness.   Hematological: Negative.    Psychiatric/Behavioral: Negative.       Objective:     Vital Signs (Most Recent):  Temp: 97.9 °F (36.6 °C) (05/21/24 0950)  Pulse: 67 (05/21/24 1230)  Resp: 12 (05/21/24 1230)  BP: (!) 104/58 (05/21/24 1230)  SpO2: 96 % (05/21/24 0950) Vital Signs (24h Range):  Temp:   [97.9 °F (36.6 °C)] 97.9 °F (36.6 °C)  Pulse:  [67-96] 67  Resp:  [12-18] 12  SpO2:  [96 %] 96 %  BP: ()/(45-58) 104/58     Weight: 92.5 kg (204 lb)  Body mass index is 27.67 kg/m².     Physical Exam  Vitals reviewed.   Constitutional:       General: He is not in acute distress.     Appearance: Normal appearance. He is not ill-appearing.   HENT:      Head: Normocephalic and atraumatic.      Nose: Nose normal. No congestion.      Mouth/Throat:      Pharynx: Oropharynx is clear.   Eyes:      Extraocular Movements: Extraocular movements intact.      Conjunctiva/sclera: Conjunctivae normal.      Pupils: Pupils are equal, round, and reactive to light.   Cardiovascular:      Rate and Rhythm: Normal rate and regular rhythm.      Pulses: Normal pulses.      Heart sounds: Murmur heard.   Pulmonary:      Effort: Pulmonary effort is normal. No respiratory distress.      Comments: Basilar crackles  Abdominal:      General: Bowel sounds are normal. There is no distension.      Palpations: Abdomen is soft.      Tenderness: There is no abdominal tenderness.   Musculoskeletal:         General: No tenderness.      Right lower leg: Edema present.      Left lower leg: Edema present.   Lymphadenopathy:      Cervical: No cervical adenopathy.   Skin:     General: Skin is warm and dry.   Neurological:      Mental Status: He is alert and oriented to person, place, and time. Mental status is at baseline.   Psychiatric:         Mood and Affect: Mood normal.              CRANIAL NERVES     CN III, IV, VI   Pupils are equal, round, and reactive to light.       Significant Labs: All pertinent labs within the past 24 hours have been reviewed.    Significant Imaging: I have reviewed all pertinent imaging results/findings within the past 24 hours.  Assessment/Plan:     * Ischemic cardiomyopathy    3/29/24 Echo   Normal LV size with severely reduced systolic function.  Estimated LVEF   25%.   There is akinesis of the mid to apical  anteroseptal wall and entire apex.   There is a small apical thrombus on contrasted images.   No significant valvular abnormalities.   Moderate biatrial enlargement.     Compared to prior study in July 2021, there has been little change.    Potential apical thrombus mentioned on that study as well.     Left Ventricle   Normal wall thickness observed. Moderate to severe (25-30%) ejection fraction. Left ventricular diastolic dysfunction. There is mid to apical anteroseptal wall and apical akinesis. There is a probable small thrombus present. The thrombus is located in the apex.     Right Ventricle   The right ventricular cavity size is normal. Low normal systolic function.     Left Atrium   Left atrium is moderately dilated.     Right Atrium   Right atrium is moderately dilated.     IVC/SVC   Normal central venous pressure (0-5mm Hg).     Mitral Valve   There is mild valve leaflet calcification. No stenosis. Trace mitral regurgitation.     Tricuspid Valve   Tricsupid valve appears grossly normal. Trace tricuspid regurgitation. Pulmonary hypertension absent. RVSP is 28.00.     Aortic Valve   The valve is tricuspid. There is calcification with reduced excursion of the aortic valve present.No aortic stenosis. No aortic regurgitation.     Pulmonic Valve   Normal valve structure. Trace transvalvular regurgitation. No stenosis.     Ascending Aorta   The sinus of Valsalva is normal. The sinotubular junction is normal. The ascending aorta is not well visualized.     Pericardium   No pericardial effusion.     >continue GDMT  >Pt w/ elevation in bnp, though lower than previous value  >consult cardiology  >start iv lasix 60 mg bid    GI bleed  Patient has acute blood loss due to hemorrhage, the hemorrhage is due to gastrointestinal bleed, patient does have a propensity for bleeding due to a medication, the medication is elequis.. Will trend hemoglobin/hematocrit Every 6 hours, as well as monitor and correct for any coagulation  defects. CBC and vital signs have been reviewed and last CBC was noted-   Lab Results   Component Value Date    WBC 3.82 (L) 05/21/2024    HGB 8.3 (L) 05/21/2024    HCT 26.4 (L) 05/21/2024    MCV 98.9 (H) 05/21/2024     (L) 05/21/2024         Will order a type and screen and consent patient for blood transfusion. Will transfuse if Hgb is <7g/dl (<8g/dl in cases of active ACS) or if patient has rapid bleeding leading to hemodynamic instability.    Left ventricular thrombus  Was recently diagnosed and tapered to elequis 2.5 mg bid per wife  However d/t drop in hb 10>8.3, will hold off to any AC pending GI evaluation, Cardiology on board, will get repeat echo.       Malignant neoplasm of pancreas  S/p whipples procedure  Continue creon w/ meals  Per wife he is in remission, follows w/ Oncologist in [place]      Moderate persistent asthma  Cont inhalers      Mixed hyperlipidemia  statin      Primary hypertension  Chronic, controlled. Latest blood pressure and vitals reviewed-     Temp:  [97.9 °F (36.6 °C)]   Pulse:  [67-96]   Resp:  [12-18]   BP: ()/(45-58)   SpO2:  [96 %] .   Home meds for hypertension were reviewed and noted below.   Hypertension Medications               furosemide (LASIX) 40 MG tablet Take 40 mg by mouth 2 (two) times daily.    sacubitriL-valsartan (ENTRESTO) 24-26 mg per tablet Take 1 tablet by mouth 2 (two) times daily.    spironolactone (ALDACTONE) 50 MG tablet Take 50 mg by mouth Daily.    sacubitriL-valsartan (ENTRESTO) 24-26 mg per tablet Take 1 tablet by mouth 2 (two) times daily.            While in the hospital, will manage blood pressure as follows; Continue home antihypertensive regimen    Will utilize p.r.n. blood pressure medication only if patient's blood pressure greater than 180/110 and he develops symptoms such as worsening chest pain or shortness of breath.      VTE Risk Mitigation (From admission, onward)           Ordered     IP VTE HIGH RISK PATIENT  Once          05/21/24 1552     Place sequential compression device  Until discontinued         05/21/24 1552                                    Rehmat HARJINDER Flanagan MD  Department of Hospital Medicine  [facility] - Emergency Department

## 2024-05-21 NOTE — ASSESSMENT & PLAN NOTE
S/p whipples procedure  Continue creon w/ meals  Per wife he is in remission, follows w/ Oncologist in [place]

## 2024-05-21 NOTE — ED TRIAGE NOTES
Patient arrives to ED with complaints of bilateral leg swelling. Patient states hx of CHF and has had to be hospitalized for diuresis and paracentesis at one point. Patient states legs have been getting more swollen over past couple of days. Patient denies CP or SOB.

## 2024-05-21 NOTE — ED PROVIDER NOTES
Encounter Date: 5/21/2024       History     Chief Complaint   Patient presents with    Leg Swelling     Bilateral     Patient is a 78 y.o. male with PMH significant for anemia, asthma, previous CVA, chronic systolic CHF (with EF of 25% and apical thrombus on most recent ECHO) and history of pancreatic cancer s/p Whipple procedure as well as CAD s/p CABG, AICD, who presents to the ED today with worsening bilateral lower extremity edema and trace abdominal swelling. The patient states that he underwent radiation in the past for prostate cancer and has been receiving chemotherapy at [facility]. He states that he has a history of DVT. He is currently on Eliquis 2.5mg BID due to passing large clots per rectum. The patient is known to cardiology (Dr. Hughes). The patient's wife states that he last passed clots yesterday. The patient has had a colonoscopy in the past in 2014. The patient has had a GI consult placed, but has not yet been scheduled to see GI. The patient denies chest pain and denies orthopnea. Some history is obtained by his wife at bedside. The patient states that he fell 3 weeks ago on a Friday night next to the freezer. He states that he did not go to the ER afterwards. He states that he did not hit his head at that time. He does endorse some RT sided rib pain and thoracic back pain. He denied any weakness or lack of motor function at the time. He states that his wife and grandson had to help him get up. He lives at home with his wife.       Review of patient's allergies indicates:   Allergen Reactions    Hay fever and allergy relief Rash    Pollen extracts Rash     Past Medical History:   Diagnosis Date    Aortic heart murmur     Asthma     Cancer     Deep vein thrombosis     Emphysema/COPD     Hyperlipidemia     Ischemic cardiomyopathy     Mitral regurgitation     Old myocardial infarction 03/2001    Tricuspid regurgitation     Vitamin B 12 deficiency     Vitamin D deficiency      Past Surgical  History:   Procedure Laterality Date    CARDIAC CATHETERIZATION      CARDIAC DEFIBRILLATOR PLACEMENT      CHOLECYSTECTOMY      CORONARY ARTERY BYPASS GRAFT      TOTAL KNEE ARTHROPLASTY Bilateral      Family History   Problem Relation Name Age of Onset    Emphysema Father       Social History     Tobacco Use    Smoking status: Never     Passive exposure: Never    Smokeless tobacco: Never   Substance Use Topics    Alcohol use: Never    Drug use: Never     Review of Systems   Constitutional:  Negative for chills and fever.   HENT:  Negative for congestion.    Respiratory:  Negative for cough, shortness of breath and wheezing.    Cardiovascular:  Positive for leg swelling. Negative for chest pain.   Gastrointestinal:  Positive for abdominal distention, anal bleeding and blood in stool. Negative for diarrhea, nausea and vomiting.   Genitourinary:  Negative for hematuria.   Musculoskeletal:  Positive for back pain.   Neurological:  Positive for syncope. Negative for weakness.       Physical Exam     Initial Vitals [05/21/24 0950]   BP Pulse Resp Temp SpO2   (!) 99/45 96 18 97.9 °F (36.6 °C) 96 %      MAP       --         Physical Exam    Constitutional: He appears well-developed and well-nourished.   HENT:   Head: Normocephalic and atraumatic.   Right Ear: External ear normal.   Left Ear: External ear normal.   Mouth/Throat: Oropharynx is clear and moist.   Eyes: Conjunctivae and EOM are normal. Pupils are equal, round, and reactive to light.   Cardiovascular:  Normal rate and regular rhythm.           Murmur heard.  Pulmonary/Chest:   Some crackles present in bilateral lower lobes on auscultation    Abdominal: Abdomen is soft. Bowel sounds are normal.   Trace edema of abdomen There is no rebound and no guarding.   Musculoskeletal:         General: Edema (+3 bilaterally) present.      Comments: Tenderness to palpation of RT ribcage     Tenderness to palpation of thoracic spine     Neurological: He is alert and oriented to  person, place, and time. He has normal strength. No cranial nerve deficit or sensory deficit.   Skin: Skin is warm.   Area on the RT lower arm where biopsy was recently taken (scabbed)         Medical Screening Exam   See Full Note    ED Course   Procedures  Labs Reviewed   COMPREHENSIVE METABOLIC PANEL - Abnormal; Notable for the following components:       Result Value    Potassium 3.2 (*)     Glucose 110 (*)     BUN 23 (*)     Calcium 8.0 (*)     Total Protein 5.8 (*)     Albumin 2.2 (*)     Alk Phos 117 (*)     All other components within normal limits   CBC WITH DIFFERENTIAL - Abnormal; Notable for the following components:    WBC 3.82 (*)     RBC 2.67 (*)     Hemoglobin 8.3 (*)     Hematocrit 26.4 (*)     MCV 98.9 (*)     MCH 31.1 (*)     MCHC 31.4 (*)     RDW 16.7 (*)     Platelet Count 113 (*)     Lymphocytes % 26.7 (*)     Monocytes % 11.5 (*)     Eosinophils % 0.8 (*)     All other components within normal limits   NT-PRO NATRIURETIC PEPTIDE - Abnormal; Notable for the following components:    ProBNP 2,491 (*)     All other components within normal limits   POCT OCCULT BLOOD (STOOL) - Abnormal; Notable for the following components:    Fecal Occult Blood Positive (*)     All other components within normal limits   APTT - Normal   PROTIME-INR - Normal   TROPONIN I - Normal   MAGNESIUM - Normal   SARS-COV-2 RNA AMPLIFICATION, QUAL - Normal    Narrative:     Negative SARS-CoV results should not be used as the sole basis for treatment or patient management decisions; negative results should be considered in the context of a patient's recent exposures, history and the presene of clinical signs and symptoms consistent with COVID-19.  Negative results should be treated as presumptive and confirmed by molecular assay, if necessary for patient management.   CBC W/ AUTO DIFFERENTIAL    Narrative:     The following orders were created for panel order CBC Auto Differential.  Procedure                                Abnormality         Status                     ---------                               -----------         ------                     CBC with Differential[[id number]]       Abnormal            Final result                 Please view results for these tests on the individual orders.   URINALYSIS, REFLEX TO URINE CULTURE        ECG Results              EKG 12-lead (Final result)        Collection Time Result Time QRS Duration OHS QTC Calculation    05/21/24 11:06:26 05/21/24 23:23:21 114 468                     Final result by Interface, Lab In [facility] (05/21/24 23:23:25)                   Narrative:    Test Reason : I50.9,    Vent. Rate : 061 BPM     Atrial Rate : 000 BPM     P-R Int : 170 ms          QRS Dur : 114 ms      QT Int : 466 ms       P-R-T Axes : 064 081 039 degrees     QTc Int : 468 ms    Sinus arrhythmia with PVC(s)  Borderline prolonged QT interval  Anterior infarct - age undetermined  Small inferior Q waves: infarct cannot be excluded  Lateral ST-T abnormality may be due to myocardial ischemia  Abnormal ECG    Confirmed by Niall CORCORAN, John ARREOLA ([id number]) on 5/21/2024 11:23:17 PM    Referred By: [email or web address]   SELF           Confirmed By:John Tierney MD                                  Imaging Results              X-Ray Chest AP Portable (Final result)  Result time 05/21/24 11:32:42      Final result by Damian Childers DO (05/21/24 11:32:42)                   Impression:      Diffuse prominence of the interstitial lung markings and pulmonary vasculature, correlate with fluid status      Electronically signed by: Damian Childers  Date:    05/21/2024  Time:    11:32               Narrative:    EXAMINATION:  XR CHEST AP PORTABLE    CLINICAL HISTORY:  RT sided rib pain;    TECHNIQUE:  XR CHEST AP PORTABLE    COMPARISON:  5/21/24    FINDINGS:  No lines or tubes.    Diffuse prominence of the interstitial lung markings and pulmonary vasculature, correlate with fluid status    Normal pleura.    Cardiac  silhouette is similar to comparison exam.    No obvious acute bone findings.                                       X-Ray Thoracic Spine AP Lateral (Final result)  Result time 05/21/24 11:39:55      Final result by Damian Childers DO (05/21/24 11:39:55)                   Impression:      No acute fracture or dislocation although depending on clinical exam findings CT of the thoracic spine should be considered.    Mild to moderate multilevel degenerative changes of the thoracic spine      Electronically signed by: Damian Childers  Date:    05/21/2024  Time:    11:39               Narrative:    EXAMINATION:  XR THORACIC SPINE AP LATERAL    CLINICAL HISTORY:  Tenderness to palpation of thoracic spine;    TECHNIQUE:  XR THORACIC SPINE AP LATERAL    COMPARISON:  1/8/24    FINDINGS:  No acute fracture or dislocation.    Mild to moderate multilevel degenerative changes of the thoracic spine                                       US Lower Extremity Veins Bilateral (Final result)  Result time 05/21/24 11:20:58      Final result by Srikanth Barragan DO (05/21/24 11:20:58)                   Impression:      No evidence of deep venous thrombosis in either lower extremity.    Point of Service: [facility]      Electronically signed by: Srikanth Barragan  Date:    05/21/2024  Time:    11:20               Narrative:    EXAMINATION:  US LOWER EXTREMITY VEINS BILATERAL    CLINICAL HISTORY:  Localized edema    COMPARISON:  No relevant comparison .    TECHNIQUE:  Grayscale, spectral, and color Doppler interrogation of the bilateral lower extremity veins was performed. Augmentation and compression was performed.    FINDINGS:  Grayscale, color Doppler, and pulsed Doppler evaluation of the veins of the bilateral lower extremity demonstrate no evidence of deep venous thrombosis.                                       Medications   potassium bicarbonate disintegrating tablet 50 mEq (50 mEq Oral Given 5/22/24 1852)   potassium  chloride SA CR tablet 40 mEq (40 mEq Oral Given 5/21/24 1245)   furosemide injection 40 mg (40 mg Intravenous Given 5/21/24 1315)   perflutren lipid microspheres injection 1.5 mL (1.5 mLs Intravenous Given 5/21/24 1600)   bisacodyL EC tablet 20 mg (20 mg Oral Given 5/22/24 1657)   polyethylene glycol (GoLYTELY) solution (4,000 mLs Oral Given 5/22/24 1657)   potassium chloride 10 mEq in 100 mL IVPB (10 mEq Intravenous New Bag 5/23/24 1339)     Medical Decision Making  RECTAL BLEEDING.  CLOTS.  S/P WHIPPLE AND MULTIPLE OTHER MEDICAL PROBLEMS.      DDX:  UPPER VS LOWER GI BLEED VS OTHER    ADMIT    Amount and/or Complexity of Data Reviewed  Labs: ordered. Decision-making details documented in ED Course.  Radiology: ordered. Decision-making details documented in ED Course.  ECG/medicine tests: ordered. Decision-making details documented in ED Course.  Discussion of management or test interpretation with external provider(s): DISCUSSED WITH ADMITTING SERVICE.      Risk  Prescription drug management.  Decision regarding hospitalization.              Attending Attestation:   Physician Attestation Statement for Resident:  As the supervising MD   Physician Attestation Statement: I have personally seen and examined this patient.   I agree with the above history.  -:   As the supervising MD I agree with the above PE.     As the supervising MD I agree with the above treatment, course, plan, and disposition.                    ED Course as of 06/10/24 2256   Tue May 21, 2024   1140 FOBT +. H/H is 8.3/26.4. K is low at 3.2. Giving 40MEQ of K. Mg level pending. Pro-BNP is 2,491. [DW]   1141 Bilateral LE Doppler - negative for DVT  [DW]   1141 CXR: Diffuse prominence of the interstitial lung markings and pulmonary vasculature, correlate with fluid status. Patient has vascular congestion compared to previous CXR on 1/8/2024 [DW]   1149 X-ray thoracic spine: No acute fracture or dislocation although depending on clinical exam  findings CT of the thoracic spine should be considered. Mild to moderate multilevel degenerative changes of the thoracic spine [DW]      ED Course User Index  [DW] Anna Borrero MD                           Clinical Impression:   Final diagnoses:  [R60.0] Bilateral lower extremity edema (Primary)  [M54.6] Acute midline thoracic back pain  [D64.9] Anemia, unspecified type  [K92.2] Gastrointestinal hemorrhage, unspecified gastrointestinal hemorrhage type  [E87.6] Hypokalemia        ED Disposition Condition    Admit Stable                Anna Borrero MD  Resident  05/26/24 2750       Rodri Cardenas MD  06/10/24 9346

## 2024-05-21 NOTE — ASSESSMENT & PLAN NOTE
Patient has acute blood loss due to hemorrhage, the hemorrhage is due to gastrointestinal bleed, patient does have a propensity for bleeding due to a medication, the medication is elequis.. Will trend hemoglobin/hematocrit Every 6 hours, as well as monitor and correct for any coagulation defects. CBC and vital signs have been reviewed and last CBC was noted-   Lab Results   Component Value Date    WBC 3.82 (L) 05/21/2024    HGB 8.3 (L) 05/21/2024    HCT 26.4 (L) 05/21/2024    MCV 98.9 (H) 05/21/2024     (L) 05/21/2024         Will order a type and screen and consent patient for blood transfusion. Will transfuse if Hgb is <7g/dl (<8g/dl in cases of active ACS) or if patient has rapid bleeding leading to hemodynamic instability.

## 2024-05-22 ENCOUNTER — ANESTHESIA EVENT (OUTPATIENT)
Dept: GASTROENTEROLOGY | Facility: HOSPITAL | Age: 79
DRG: 377 | End: 2024-05-22
Payer: MEDICARE

## 2024-05-22 ENCOUNTER — ANESTHESIA (OUTPATIENT)
Dept: GASTROENTEROLOGY | Facility: HOSPITAL | Age: 79
DRG: 377 | End: 2024-05-22
Payer: MEDICARE

## 2024-05-22 VITALS
RESPIRATION RATE: 19 BRPM | TEMPERATURE: 97 F | SYSTOLIC BLOOD PRESSURE: 114 MMHG | HEART RATE: 83 BPM | DIASTOLIC BLOOD PRESSURE: 61 MMHG | OXYGEN SATURATION: 98 %

## 2024-05-22 LAB
AFP-TM SERPL-MCNC: 1.5 NG/ML (ref 0–8)
ALBUMIN SERPL BCP-MCNC: 2.3 G/DL (ref 3.5–5)
ALBUMIN/GLOB SERPL: 0.7 {RATIO}
ALP SERPL-CCNC: 90 U/L (ref 45–115)
ALT SERPL W P-5'-P-CCNC: 15 U/L (ref 16–61)
ANION GAP SERPL CALCULATED.3IONS-SCNC: 7 MMOL/L (ref 7–16)
AORTIC ROOT ANNULUS: 3.49 CM
AORTIC VALVE CUSP SEPERATION: 1.26 CM
AST SERPL W P-5'-P-CCNC: 21 U/L (ref 15–37)
AV INDEX (PROSTH): 0.64
AV MEAN GRADIENT: 6 MMHG
AV PEAK GRADIENT: 11 MMHG
AV VALVE AREA BY VELOCITY RATIO: 1.52 CM²
AV VALVE AREA: 1.75 CM²
AV VELOCITY RATIO: 0.56
BASOPHILS # BLD AUTO: 0.02 K/UL (ref 0–0.2)
BASOPHILS NFR BLD AUTO: 0.5 % (ref 0–1)
BILIRUB SERPL-MCNC: 0.5 MG/DL (ref ?–1.2)
BSA FOR ECHO PROCEDURE: 2.17 M2
BUN SERPL-MCNC: 22 MG/DL (ref 7–18)
BUN/CREAT SERPL: 20 (ref 6–20)
CALCIUM SERPL-MCNC: 8.2 MG/DL (ref 8.5–10.1)
CHLORIDE SERPL-SCNC: 110 MMOL/L (ref 98–107)
CO2 SERPL-SCNC: 29 MMOL/L (ref 21–32)
CREAT SERPL-MCNC: 1.08 MG/DL (ref 0.7–1.3)
CV ECHO LV RWT: 0.51 CM
DIFFERENTIAL METHOD BLD: ABNORMAL
DOP CALC AO PEAK VEL: 1.64 M/S
DOP CALC AO VTI: 39 CM
DOP CALC LVOT AREA: 2.7 CM2
DOP CALC LVOT DIAMETER: 1.86 CM
DOP CALC LVOT PEAK VEL: 0.92 M/S
DOP CALC LVOT STROKE VOLUME: 68.17 CM3
DOP CALCLVOT PEAK VEL VTI: 25.1 CM
E WAVE DECELERATION TIME: 288.23 MSEC
E/A RATIO: 1.67
E/E' RATIO: 30.57 M/S
ECHO LV POSTERIOR WALL: 1.35 CM (ref 0.6–1.1)
EGFR (NO RACE VARIABLE) (RUSH/TITUS): 69 ML/MIN/1.73M2
EJECTION FRACTION: 25 %
EOSINOPHIL # BLD AUTO: 0 K/UL (ref 0–0.5)
EOSINOPHIL NFR BLD AUTO: 0 % (ref 1–4)
ERYTHROCYTE [DISTWIDTH] IN BLOOD BY AUTOMATED COUNT: 16.9 % (ref 11.5–14.5)
FRACTIONAL SHORTENING: 31 % (ref 28–44)
GLOBULIN SER-MCNC: 3.1 G/DL (ref 2–4)
GLUCOSE SERPL-MCNC: 97 MG/DL (ref 74–106)
HCT VFR BLD AUTO: 26.3 % (ref 40–54)
HCT VFR BLD AUTO: 27.3 % (ref 40–54)
HCT VFR BLD AUTO: 28.1 % (ref 40–54)
HGB BLD-MCNC: 8.6 G/DL (ref 13.5–18)
HGB BLD-MCNC: 8.9 G/DL (ref 13.5–18)
HGB BLD-MCNC: 9.1 G/DL (ref 13.5–18)
IMM GRANULOCYTES # BLD AUTO: 0 K/UL (ref 0–0.04)
IMM GRANULOCYTES NFR BLD: 0 % (ref 0–0.4)
INTERVENTRICULAR SEPTUM: 0.82 CM (ref 0.6–1.1)
IVC DIAMETER: 1.81 CM
LEFT ATRIUM VOLUME INDEX MOD: 62 ML/M2
LEFT ATRIUM VOLUME MOD: 133.35 CM3
LEFT INTERNAL DIMENSION IN SYSTOLE: 3.65 CM (ref 2.1–4)
LEFT VENTRICLE DIASTOLIC VOLUME INDEX: 62.53 ML/M2
LEFT VENTRICLE DIASTOLIC VOLUME: 134.43 ML
LEFT VENTRICLE MASS INDEX: 103 G/M2
LEFT VENTRICLE SYSTOLIC VOLUME INDEX: 26.2 ML/M2
LEFT VENTRICLE SYSTOLIC VOLUME: 56.32 ML
LEFT VENTRICULAR INTERNAL DIMENSION IN DIASTOLE: 5.28 CM (ref 3.5–6)
LEFT VENTRICULAR MASS: 222.16 G
LV LATERAL E/E' RATIO: 26.75 M/S
LV SEPTAL E/E' RATIO: 35.67 M/S
LVOT MG: 1.91 MMHG
LVOT MV: 0.66 CM/S
LYMPHOCYTES # BLD AUTO: 0.91 K/UL (ref 1–4.8)
LYMPHOCYTES NFR BLD AUTO: 24.4 % (ref 27–41)
MAGNESIUM SERPL-MCNC: 1.8 MG/DL (ref 1.7–2.3)
MCH RBC QN AUTO: 31.3 PG (ref 27–31)
MCHC RBC AUTO-ENTMCNC: 32.6 G/DL (ref 32–36)
MCV RBC AUTO: 96.1 FL (ref 80–96)
MONOCYTES # BLD AUTO: 0.42 K/UL (ref 0–0.8)
MONOCYTES NFR BLD AUTO: 11.3 % (ref 2–6)
MPC BLD CALC-MCNC: 10.6 FL (ref 9.4–12.4)
MV PEAK A VEL: 0.64 M/S
MV PEAK E VEL: 1.07 M/S
MV STENOSIS PRESSURE HALF TIME: 83.59 MS
MV VALVE AREA P 1/2 METHOD: 2.63 CM2
NEUTROPHILS # BLD AUTO: 2.38 K/UL (ref 1.8–7.7)
NEUTROPHILS NFR BLD AUTO: 63.8 % (ref 53–65)
NRBC # BLD AUTO: 0 X10E3/UL
NRBC, AUTO (.00): 0 %
PHOSPHATE SERPL-MCNC: 3.9 MG/DL (ref 2.5–4.5)
PISA MRMAX VEL: 3.95 M/S
PISA TR MAX VEL: 2.76 M/S
PLATELET # BLD AUTO: 112 K/UL (ref 150–400)
POTASSIUM SERPL-SCNC: 2.8 MMOL/L (ref 3.5–5.1)
PROT SERPL-MCNC: 5.4 G/DL (ref 6.4–8.2)
PV PEAK GRADIENT: 5 MMHG
PV PEAK VELOCITY: 1.09 M/S
RA PRESSURE ESTIMATED: 3 MMHG
RA VOL SYS: 71.06 ML
RBC # BLD AUTO: 2.84 M/UL (ref 4.6–6.2)
RIGHT ATRIAL AREA: 21.1 CM2
RIGHT VENTRICULAR LENGTH IN DIASTOLE (APICAL 4-CHAMBER VIEW): 8.15 CM
RV MID DIAMA: 2.45 CM
RV TB RVSP: 6 MMHG
SODIUM SERPL-SCNC: 143 MMOL/L (ref 136–145)
TDI LATERAL: 0.04 M/S
TDI SEPTAL: 0.03 M/S
TDI: 0.04 M/S
TR MAX PG: 30 MMHG
TRICUSPID ANNULAR PLANE SYSTOLIC EXCURSION: 1.44 CM
TV REST PULMONARY ARTERY PRESSURE: 33 MMHG
WBC # BLD AUTO: 3.73 K/UL (ref 4.5–11)
Z-SCORE OF LEFT VENTRICULAR DIMENSION IN END DIASTOLE: -2.79
Z-SCORE OF LEFT VENTRICULAR DIMENSION IN END SYSTOLE: -1.21

## 2024-05-22 PROCEDURE — 25000003 PHARM REV CODE 250: Performed by: INTERNAL MEDICINE

## 2024-05-22 PROCEDURE — 99900035 HC TECH TIME PER 15 MIN (STAT)

## 2024-05-22 PROCEDURE — C9113 INJ PANTOPRAZOLE SODIUM, VIA: HCPCS | Performed by: INTERNAL MEDICINE

## 2024-05-22 PROCEDURE — 36415 COLL VENOUS BLD VENIPUNCTURE: CPT | Performed by: INTERNAL MEDICINE

## 2024-05-22 PROCEDURE — 63600175 PHARM REV CODE 636 W HCPCS: Performed by: INTERNAL MEDICINE

## 2024-05-22 PROCEDURE — 94640 AIRWAY INHALATION TREATMENT: CPT

## 2024-05-22 PROCEDURE — 97161 PT EVAL LOW COMPLEX 20 MIN: CPT

## 2024-05-22 PROCEDURE — 82105 ALPHA-FETOPROTEIN SERUM: CPT | Performed by: INTERNAL MEDICINE

## 2024-05-22 PROCEDURE — 94761 N-INVAS EAR/PLS OXIMETRY MLT: CPT

## 2024-05-22 PROCEDURE — 83735 ASSAY OF MAGNESIUM: CPT | Performed by: INTERNAL MEDICINE

## 2024-05-22 PROCEDURE — 84100 ASSAY OF PHOSPHORUS: CPT | Performed by: INTERNAL MEDICINE

## 2024-05-22 PROCEDURE — 80053 COMPREHEN METABOLIC PANEL: CPT | Performed by: INTERNAL MEDICINE

## 2024-05-22 PROCEDURE — 43235 EGD DIAGNOSTIC BRUSH WASH: CPT | Performed by: INTERNAL MEDICINE

## 2024-05-22 PROCEDURE — D9220A PRA ANESTHESIA: Mod: ,,, | Performed by: NURSE ANESTHETIST, CERTIFIED REGISTERED

## 2024-05-22 PROCEDURE — 97166 OT EVAL MOD COMPLEX 45 MIN: CPT

## 2024-05-22 PROCEDURE — 0DJ08ZZ INSPECTION OF UPPER INTESTINAL TRACT, VIA NATURAL OR ARTIFICIAL OPENING ENDOSCOPIC: ICD-10-PCS | Performed by: INTERNAL MEDICINE

## 2024-05-22 PROCEDURE — 25000242 PHARM REV CODE 250 ALT 637 W/ HCPCS: Performed by: INTERNAL MEDICINE

## 2024-05-22 PROCEDURE — 99232 SBSQ HOSP IP/OBS MODERATE 35: CPT | Mod: ,,, | Performed by: INTERNAL MEDICINE

## 2024-05-22 PROCEDURE — 63600175 PHARM REV CODE 636 W HCPCS: Performed by: NURSE ANESTHETIST, CERTIFIED REGISTERED

## 2024-05-22 PROCEDURE — 85014 HEMATOCRIT: CPT | Performed by: INTERNAL MEDICINE

## 2024-05-22 PROCEDURE — 43235 EGD DIAGNOSTIC BRUSH WASH: CPT | Mod: ,,, | Performed by: INTERNAL MEDICINE

## 2024-05-22 PROCEDURE — 25000003 PHARM REV CODE 250: Performed by: NURSE ANESTHETIST, CERTIFIED REGISTERED

## 2024-05-22 PROCEDURE — 11000001 HC ACUTE MED/SURG PRIVATE ROOM

## 2024-05-22 PROCEDURE — 85025 COMPLETE CBC W/AUTO DIFF WBC: CPT | Performed by: INTERNAL MEDICINE

## 2024-05-22 PROCEDURE — 85018 HEMOGLOBIN: CPT | Performed by: INTERNAL MEDICINE

## 2024-05-22 RX ORDER — LORAZEPAM 0.5 MG/1
0.5 TABLET ORAL EVERY 6 HOURS PRN
Status: DISCONTINUED | OUTPATIENT
Start: 2024-05-22 | End: 2024-05-23 | Stop reason: HOSPADM

## 2024-05-22 RX ORDER — BISACODYL 5 MG
20 TABLET, DELAYED RELEASE (ENTERIC COATED) ORAL ONCE
Status: COMPLETED | OUTPATIENT
Start: 2024-05-22 | End: 2024-05-22

## 2024-05-22 RX ORDER — ALUMINUM HYDROXIDE, MAGNESIUM HYDROXIDE, AND SIMETHICONE 2400; 240; 2400 MG/30ML; MG/30ML; MG/30ML
15 SUSPENSION ORAL 4 TIMES DAILY PRN
Status: DISCONTINUED | OUTPATIENT
Start: 2024-05-22 | End: 2024-05-23 | Stop reason: HOSPADM

## 2024-05-22 RX ORDER — PROPOFOL 10 MG/ML
VIAL (ML) INTRAVENOUS CONTINUOUS PRN
Status: DISCONTINUED | OUTPATIENT
Start: 2024-05-22 | End: 2024-05-22

## 2024-05-22 RX ORDER — LIDOCAINE HYDROCHLORIDE 20 MG/ML
INJECTION, SOLUTION EPIDURAL; INFILTRATION; INTRACAUDAL; PERINEURAL
Status: DISCONTINUED | OUTPATIENT
Start: 2024-05-22 | End: 2024-05-22

## 2024-05-22 RX ORDER — POLYETHYLENE GLYCOL 3350, SODIUM SULFATE ANHYDROUS, SODIUM BICARBONATE, SODIUM CHLORIDE, POTASSIUM CHLORIDE 236; 22.74; 6.74; 5.86; 2.97 G/4L; G/4L; G/4L; G/4L; G/4L
4000 POWDER, FOR SOLUTION ORAL ONCE
Status: COMPLETED | OUTPATIENT
Start: 2024-05-22 | End: 2024-05-22

## 2024-05-22 RX ORDER — ETOMIDATE 2 MG/ML
INJECTION INTRAVENOUS
Status: DISCONTINUED | OUTPATIENT
Start: 2024-05-22 | End: 2024-05-22

## 2024-05-22 RX ADMIN — SERTRALINE HYDROCHLORIDE 25 MG: 25 TABLET ORAL at 08:05

## 2024-05-22 RX ADMIN — BUDESONIDE INHALATION 0.5 MG: 0.5 SUSPENSION RESPIRATORY (INHALATION) at 08:05

## 2024-05-22 RX ADMIN — LIDOCAINE HYDROCHLORIDE 50 MG: 20 INJECTION, SOLUTION INTRAVENOUS at 03:05

## 2024-05-22 RX ADMIN — BISACODYL 20 MG: 5 TABLET, COATED ORAL at 04:05

## 2024-05-22 RX ADMIN — ALBUTEROL SULFATE 2.5 MG: 2.5 SOLUTION RESPIRATORY (INHALATION) at 01:05

## 2024-05-22 RX ADMIN — FUROSEMIDE 60 MG: 10 INJECTION, SOLUTION INTRAMUSCULAR; INTRAVENOUS at 09:05

## 2024-05-22 RX ADMIN — ALBUTEROL SULFATE 2.5 MG: 2.5 SOLUTION RESPIRATORY (INHALATION) at 12:05

## 2024-05-22 RX ADMIN — FUROSEMIDE 60 MG: 10 INJECTION, SOLUTION INTRAMUSCULAR; INTRAVENOUS at 08:05

## 2024-05-22 RX ADMIN — ETOMIDATE 4 MG: 20 INJECTION, SOLUTION INTRAVENOUS at 03:05

## 2024-05-22 RX ADMIN — SENNOSIDES AND DOCUSATE SODIUM 1 TABLET: 8.6; 5 TABLET ORAL at 09:05

## 2024-05-22 RX ADMIN — BUDESONIDE INHALATION 0.5 MG: 0.5 SUSPENSION RESPIRATORY (INHALATION) at 07:05

## 2024-05-22 RX ADMIN — ETOMIDATE 5 MG: 20 INJECTION, SOLUTION INTRAVENOUS at 03:05

## 2024-05-22 RX ADMIN — PANTOPRAZOLE SODIUM 40 MG: 40 INJECTION, POWDER, LYOPHILIZED, FOR SOLUTION INTRAVENOUS at 08:05

## 2024-05-22 RX ADMIN — POLYETHYLENE GLYCOL 3350, SODIUM SULFATE ANHYDROUS, SODIUM BICARBONATE, SODIUM CHLORIDE, POTASSIUM CHLORIDE 4000 ML: 236; 22.74; 6.74; 5.86; 2.97 POWDER, FOR SOLUTION ORAL at 04:05

## 2024-05-22 RX ADMIN — ALBUTEROL SULFATE 2.5 MG: 2.5 SOLUTION RESPIRATORY (INHALATION) at 08:05

## 2024-05-22 RX ADMIN — SACUBITRIL AND VALSARTAN 1 TABLET: 24; 26 TABLET, FILM COATED ORAL at 09:05

## 2024-05-22 RX ADMIN — POTASSIUM BICARBONATE 50 MEQ: 977.5 TABLET, EFFERVESCENT ORAL at 09:05

## 2024-05-22 RX ADMIN — POTASSIUM BICARBONATE 50 MEQ: 977.5 TABLET, EFFERVESCENT ORAL at 10:05

## 2024-05-22 RX ADMIN — SPIRONOLACTONE 50 MG: 25 TABLET ORAL at 04:05

## 2024-05-22 RX ADMIN — ETOMIDATE 3 MG: 20 INJECTION, SOLUTION INTRAVENOUS at 03:05

## 2024-05-22 RX ADMIN — SODIUM CHLORIDE: 9 INJECTION, SOLUTION INTRAVENOUS at 03:05

## 2024-05-22 RX ADMIN — Medication 6 MG: at 10:05

## 2024-05-22 RX ADMIN — LORAZEPAM 0.5 MG: 0.5 TABLET ORAL at 02:05

## 2024-05-22 RX ADMIN — PROPOFOL 75 MCG/KG/MIN: 10 INJECTION, EMULSION INTRAVENOUS at 03:05

## 2024-05-22 RX ADMIN — ATORVASTATIN CALCIUM 40 MG: 40 TABLET, FILM COATED ORAL at 09:05

## 2024-05-22 RX ADMIN — ALBUTEROL SULFATE 2.5 MG: 2.5 SOLUTION RESPIRATORY (INHALATION) at 07:05

## 2024-05-22 RX ADMIN — PANTOPRAZOLE SODIUM 40 MG: 40 INJECTION, POWDER, LYOPHILIZED, FOR SOLUTION INTRAVENOUS at 09:05

## 2024-05-22 NOTE — PLAN OF CARE
Problem: Adult Inpatient Plan of Care  Goal: Plan of Care Review  Outcome: Progressing  Goal: Patient-Specific Goal (Individualized)  Outcome: Progressing  Goal: Absence of Hospital-Acquired Illness or Injury  Outcome: Progressing  Goal: Optimal Comfort and Wellbeing  Outcome: Progressing  Goal: Readiness for Transition of Care  Outcome: Progressing     Problem: Fall Injury Risk  Goal: Absence of Fall and Fall-Related Injury  Outcome: Progressing     Problem: Fluid Volume Excess  Goal: Fluid Balance  Outcome: Progressing     Problem: Heart Failure  Goal: Optimal Coping  Outcome: Progressing  Goal: Optimal Cardiac Output  Outcome: Progressing  Goal: Stable Heart Rate and Rhythm  Outcome: Progressing  Goal: Optimal Functional Ability  Outcome: Progressing  Goal: Fluid and Electrolyte Balance  Outcome: Progressing  Goal: Improved Oral Intake  Outcome: Progressing  Goal: Effective Oxygenation and Ventilation  Outcome: Progressing  Goal: Effective Breathing Pattern During Sleep  Outcome: Progressing

## 2024-05-22 NOTE — DISCHARGE INSTRUCTIONS
Physical Therapy Instructions    Complete the exercises standing at the kitchen sink for stability. Work up to 30 repetitions each 2 times a day. Take rest breaks as needed and keep a chair close by to sit down if needed.      Toe/Heel Raises            Stand while holding a stable object. Rise up on toes. Then rock back on heels. Hold each position 2 seconds.  Repeat 30 times per session. Do 2 sessions per day.            Squat: Double Leg (Supported)        With feet shoulder width apart, hold support. Squat, keeping lower leg vertical, knee in line with second toe. Use legs, do not pull up and down with arms. Only squat down as low as you are comfortable. Repeat 30 times with rest breaks as needed. Do 2 sessions per day         Hip Flexion (Standing)        Stand with support. Lift right knee upward. Repeat 30 times. Repeat with other leg.   Do 2 sessions per day.      HIP: Abduction - Standing        Lift one leg out to the side holding on to a support. Keep toe pointing forward to focus the exercise on the muscles on the outside of your hip.  Repeat 30 times per side. 2 times per day        SINGLE LIMB STANCE        Stand facing your kitchen counter for safety.  Balance on one leg. Hold as long as possible.    Repeat with 5 times per leg.    Copyright © I. All rights reserved.       Procedure Date  5/22/24     Impression  Overall Impression:   Healthy previous non-pylorus sparing Whipple procedure in the stomach  The upper third of the esophagus, middle third of the esophagus, lower third of the esophagus, Z-line, cardia, fundus of the stomach, body of the stomach, incisura, antrum, duodenal bulb, 1st part of the duodenum and 2nd part of the duodenum appeared normal.  The upper third of the esophagus, middle third of the esophagus, lower third of the esophagus, Z-line, cardia and fundus of the stomach appeared normal.        Recommendation  Advance diet to clear liquids  No evidence of bleeding on EGD  H&H  stable since admission and no further bleeding with defecation per primary team; attempted to call wife but was unable to reach her  Rectal bleeding sounds low volume, however, patient will be resuming Eliquis for LV thrombus  Can consider prep tonight for colonoscopy tomorrow vs resuming Eliquis and monitoring overnight with expedited outpatient colonoscopy in 2 weeks - patient and wife can consider options and discuss with Cardiology  OK to overbook on my schedule if patient opts for outpatient colonoscopy       Outcome of procedure: successful EGD  Disposition: patient to recovery following procedure; return to [facility] when appropriate parameters met  Provisions for follow up: please call my office for any unexpected symptoms like chest or abdominal pain or bleeding following your procedure.  Final Diagnosis: acute on chronic anemia, rectal bleeding       Colonoscopy   Procedure Date  5/23/24     Impression  Overall Impression:   Diverticulosis of severe severity in the ascending colon, descending colon and sigmoid colon  Abnormal mucosa in the rectum, consistent with radiation proctitis; induced coagulation with argon plasma coagulation  Large hemorrhoids     Recommendation  - Rectal bleeding radiation proctitis which is likely bleeding in the setting of anticoagulation; treated with APC today   - Can repeat flexible sigmoidoscopy as needed for re-treatment with APC if continues to have rectal bleeding in future  - OK to restart anticoagulation  - Advance diet as tolerated  - Continue present medications  - Patient has a contact number available for emergencies. The signs and symptoms of potential delayed complications were discussed with the patient. Return to normal activities tomorrow. Written discharge instructions were provided to the patient.

## 2024-05-22 NOTE — PT/OT/SLP EVAL
"Physical Therapy Evaluation and Treatment    Patient Name: Tiny Gutierrez   MRN: [id number]  Recent Surgery: * No surgery found *      Recommendations:     Discharge Recommendations: Low Intensity Therapy   Discharge Equipment Recommendations: none   Barriers to discharge: Ongoing medical treatment    Assessment:     Tiny Gutierrez is a 78 y.o. male admitted with a medical diagnosis of Ischemic cardiomyopathy. He presents with the following impairments/functional limitations: impaired endurance, impaired functional mobility, gait instability, impaired balance, impaired self care skills, edema.     Patient demonstrated safe mobility with RW and reports confidence with mobility for d/c home once medical work up completed. Significant B LE edema persists but pt reports this is improved from admit. Patient was found sitting EOB with LE's in dependent position. Patient educated on maintaining LE elevation as much as possible. Patient will benefit from PT to encourage increased activity and generally strengthening whilst hospitalized and low intensity rehab at [facility].    Rehab Prognosis: Good; patient would benefit from acute PT services to address these deficits and reach maximum level of function.    Plan:     During this hospitalization, patient to be seen 5 x/week to address the above listed problems via gait training, therapeutic activities, therapeutic exercises    Plan of Care Expires: 06/22/24    Subjective     Chief Complaint: Ischemic cardiomyopathy   Patient Comments/Goals: "I usually use a cane to get around. Sometimes I will use my rollator, depending on how I am feeling"  Pain/Comfort:  Pain Rating 1: 0/10  Pain Rating Post-Intervention 1: 0/10    Social History:  Living Environment: Patient lives with their spouse in a 2 story home with number of outside stair(s): 1  Prior Level of Function: Prior to admission, patient was modified independent with ADLs using straight cane or rollator for " mobility  Equipment Used at Home: cane, straight, walker, rolling, rollator, oxygen  DME owned (not currently used):  O2 only used intermittently  Assistance Upon Discharge: significant other and home health    Objective:     Communicated with Tanvi Queen RN prior to session. Patient found sitting edge of bed with peripheral IV, telemetry upon PT entry to room.    General Precautions: Standard, fall   Orthopedic Precautions: N/A   Braces: N/A    Respiratory Status: Room air    Exams:  Cognition: Patient is oriented to Person, Place, Time, Situation  RLE ROM: Deficits: hip WFL ;knee WFL; ankle limited by edema  RLE Strength: Deficits: 4+/5  LLE ROM: Deficits: as per right  LLE Strength: Deficits: 4+/5  Sensation:    -       Intact  Skin Integrity/Edema:     -       Edema: Moderate B LE distal to knees    Functional Mobility:  Gait belt applied - Yes  Bed Mobility  Supine to Sit: modified independence per patient  Transfers  Sit to Stand: stand by assistance with rolling walker and with cues for efficiency  Bed to Chair: stand by assistance with rolling walker   Gait  Patient ambulated 150' with rolling walker and stand by assistance. Patient demonstrates decreased step length, decreased foot clearance, flexed posture, and decreased john. No LOB. All lines remained intact throughout ambulation trail.  Balance  Sitting: independence  Standing: stand by assistance and RW      Therapeutic Activities and Exercises:   Patient educated on role of acute care PT and PT POC, safety while in hospital including calling nurse for mobility, and call light usage  Patient educated about importance of OOB mobility and remaining up in chair most of the day.  Importance of elevating LE's when sitting  Benefit of HHPT    AM-PAC 6 CLICK MOBILITY  Total Score:22    Patient left up in chair with all lines intact, call button in reach, RN notified, and spouse present.    GOALS:   Multidisciplinary Problems       Physical  Therapy Goals          Problem: Physical Therapy    Goal Priority Disciplines Outcome Goal Variances Interventions   Physical Therapy Goal     PT, PT/OT Progressing     Description: Short Term Goals to be met by: 2024    Patient will increase functional independence with mobility by performin. Supine to sit with independently  2. Sit to stand transfer with independently using Rolling walker  3. Bed to chair transfer with independently using Rolling walker  4. Gait  x 200 feet with independently using Rolling walker  5. Lower extremity exercise program x30 reps per handout, with assistance as needed    Long Term Goals to be met by: 2024    Pt will regain full independent functional mobility with lowest level of assistive device to return to home situation and prior activities of daily living.                        History:     Past Medical History:   Diagnosis Date    Aortic heart murmur     Asthma     Cancer     Deep vein thrombosis     Emphysema/COPD     Hyperlipidemia     Ischemic cardiomyopathy     Mitral regurgitation     Old myocardial infarction 2001    Tricuspid regurgitation     Vitamin B 12 deficiency     Vitamin D deficiency        Past Surgical History:   Procedure Laterality Date    CARDIAC CATHETERIZATION      CARDIAC DEFIBRILLATOR PLACEMENT      CHOLECYSTECTOMY      CORONARY ARTERY BYPASS GRAFT      TOTAL KNEE ARTHROPLASTY Bilateral        Time Tracking:     PT Received On: 24  PT Start Time: 0955  PT Stop Time: 1015  PT Total Time (min): 20 min     Billable Minutes: Evaluation Low complexity    2024

## 2024-05-22 NOTE — TRANSFER OF CARE
Anesthesia Transfer of Care Note    Patient: Tiny Gutierrez    Procedure(s) Performed: * No procedures listed *    Patient location: GI    Anesthesia Type: MAC    Transport from OR: Transported from OR on room air with adequate spontaneous ventilation. Continuous ECG monitoring in transport. Continuous SpO2 monitoring in transport    Post pain: adequate analgesia    Post assessment: no apparent anesthetic complications    Post vital signs: stable    Level of consciousness: sedated and responds to stimulation    Nausea/Vomiting: no nausea/vomiting    Complications: none    Transfer of care protocol was followedComments: Good SV continue, NAD, VSS, RTRN      Last vitals: Visit Vitals  /73   Pulse 67   Temp 36.7 °C (98 °F) (Oral)   Resp 16   Ht 6' (1.829 m)   Wt 92.5 kg (204 lb)   SpO2 100%   BMI 27.67 kg/m²

## 2024-05-22 NOTE — PROGRESS NOTES
GI Brief Plan of Care Note     EGD today wtihout complication, no varices  Discussed with primary team and Cardiology - would like to proceed with inpatient colonoscopy given HFrEF with LV thrombus and need to continue Eliquis     Plan     Prep tonight for colonoscopy tomorrow  Dr. Jarrell will assume care tomorrow    Kaleb Smith MD  Gastroenterology

## 2024-05-22 NOTE — PLAN OF CARE
Problem: Physical Therapy  Goal: Physical Therapy Goal  Description: Short Term Goals to be met by: 2024    Patient will increase functional independence with mobility by performin. Supine to sit with independently  2. Sit to stand transfer with independently using Rolling walker  3. Bed to chair transfer with independently using Rolling walker  4. Gait  x 200 feet with independently using Rolling walker  5. Lower extremity exercise program x30 reps per handout, with assistance as needed    Long Term Goals to be met by: 2024    Pt will regain full independent functional mobility with lowest level of assistive device to return to home situation and prior activities of daily living.   Outcome: Progressing       Patient demonstrated safe mobility with RW and reports confidence with mobility for d/c home once medical work up completed. Significant B LE edema persists but pt reports this is improved from admit. Patient was found sitting EOB with LE's in dependent position. Patient educated on maintaining LE elevation as much as possible. Patient will benefit from PT to encourage increased activity and generally strengthening whilst hospitalized and low intensity rehab at [facility].

## 2024-05-22 NOTE — PT/OT/SLP EVAL
Occupational Therapy   Evaluation    Name: Tiny Gutierrez  MRN: [id number]  Admitting Diagnosis: Ischemic cardiomyopathy  Recent Surgery: * No surgery found *      Recommendations:     Discharge Recommendations: Moderate Intensity Therapy  Discharge Equipment Recommendations:  cane, straight, rollator  Barriers to discharge:  None    Assessment:     Tiny Gutierrez is a 78 y.o. male with a medical diagnosis of Ischemic cardiomyopathy.  He presents with weakness. Performance deficits affecting function: decreased self care skills, gait instability, decreased strength, and endurance .      Rehab Prognosis: Good; patient would benefit from acute skilled OT services to address these deficits and reach maximum level of function.       Plan:     Patient to be seen 5 x/week to address the above listed problems via self-care/home management, therapeutic activities, therapeutic exercises  Plan of Care Expires:    Plan of Care Reviewed with: patient, spouse    Subjective     Chief Complaint: Pt had no complaints  Patient/Family Comments/goals: return home    Occupational Profile:  Living Environment: lives 2 story home with wife. Pt is able to live on first floor  Previous level of function: I with all self care skills  Roles and Routines: self care skills  Equipment Used at Home:    Assistance upon Discharge: Pt will assist from spouse at time of D/C    Pain/Comfort:       Patients cultural, spiritual, Yazidi conflicts given the current situation:      Objective:     Communicated with: nursing prior to session.  Patient found sitting edge of bed with telemetry and peripheral IV  upon OT entry to room.    General Precautions: Standard, fall  Orthopedic Precautions:    Braces:    Respiratory Status: Room air    Occupational Performance:    Bed Mobility:    Did not assess, Pt already sitting EOB    Functional Mobility/Transfers:  Patient completed Sit <> Stand Transfer with stand by assistance  with  rolling walker    Functional Mobility: Pt ambulated in hallway using RW with SBA    Activities of Daily Living:  Lower Body Dressing: minimum assistance Pt required min A to thread underwear over feet. Pt was able to pull them up after that    Cognitive/Visual Perceptual:  Cognitive/Psychosocial Skills:     -       Oriented to: Person, Place, Time, and Situation   -       Follows Commands/attention:Follows multistep  commands    Physical Exam:  Upper Extremity Range of Motion:     -       Right Upper Extremity: WFL  -       Left Upper Extremity: WFL  Upper Extremity Strength:    -       Right Upper Extremity: 3+/5  -       Left Upper Extremity: Deficits: 3+/5    AMPAC 6 Click ADL:  AMPAC Total Score: 20    Treatment & Education:  Pt educated on role and scope of OT practice  Pt educated on importance of OOB activities  Pt instructed to not get up without assistance    Patient left up in chair with all lines intact, call button in reach, and wife present    GOALS:   Multidisciplinary Problems       Occupational Therapy Goals          Problem: Occupational Therapy    Goal Priority Disciplines Outcome Interventions   Occupational Therapy Goal     OT, PT/OT Progressing    Description: STG:  Pt will perform grooming with setup  Pt will bathe with Mod I  Pt will perform UE dressing with I'ly  Pt will perform LE dressing with Mod I  Pt will sit EOB x 15 min with no assistance  Pt will transfer bed/chair/bsc with RW and supervision  Pt will perform standing task x 15 min with supervision assistance  Pt will tolerate 30 minutes of tx without fatigue      LT.Restore to max I with self care and mobility.                         History:     Past Medical History:   Diagnosis Date    Aortic heart murmur     Asthma     Cancer     Deep vein thrombosis     Emphysema/COPD     Hyperlipidemia     Ischemic cardiomyopathy     Mitral regurgitation     Old myocardial infarction 2001    Tricuspid regurgitation     Vitamin B 12 deficiency      Vitamin D deficiency          Past Surgical History:   Procedure Laterality Date    CARDIAC CATHETERIZATION      CARDIAC DEFIBRILLATOR PLACEMENT      CHOLECYSTECTOMY      CORONARY ARTERY BYPASS GRAFT      TOTAL KNEE ARTHROPLASTY Bilateral        Time Tracking:     OT Date of Treatment:    OT Start Time: 0957  OT Stop Time: 1015  OT Total Time (min): 18 min    Billable Minutes:Evaluation 18    5/22/2024

## 2024-05-22 NOTE — SUBJECTIVE & OBJECTIVE
Interval History:     Pt w/ working therapy  Says swelling is better  Says he had a BM today w/ no blood in it  Per GI will need EGD today  Awaiting cardiology eval  If h/h remains stable will restart elequis, pending gi eval.   Replace potassium for hypokalemia.     Review of Systems   Constitutional:  Positive for fatigue. Negative for chills and fever.   HENT: Negative.  Negative for congestion and rhinorrhea.    Respiratory:  Negative for apnea, cough, chest tightness and shortness of breath.    Cardiovascular: Negative.  Negative for chest pain and leg swelling.   Gastrointestinal:  Positive for blood in stool. Negative for abdominal pain, diarrhea, nausea and vomiting.   Endocrine: Negative.    Genitourinary: Negative.    Musculoskeletal:  Positive for arthralgias.   Skin: Negative.    Allergic/Immunologic: Negative.    Neurological:  Positive for weakness.   Hematological: Negative.    Psychiatric/Behavioral: Negative.       Objective:     Vital Signs (Most Recent):  Temp: 98.1 °F (36.7 °C) (05/22/24 0801)  Pulse: 70 (05/22/24 0807)  Resp: 16 (05/22/24 0807)  BP: (!) 95/59 (05/22/24 0801)  SpO2: 98 % (05/22/24 0807) Vital Signs (24h Range):  Temp:  [97.6 °F (36.4 °C)-98.2 °F (36.8 °C)] 98.1 °F (36.7 °C)  Pulse:  [56-74] 70  Resp:  [12-18] 16  SpO2:  [95 %-100 %] 98 %  BP: ()/(51-66) 95/59     Weight: 92.5 kg (204 lb)  Body mass index is 27.67 kg/m².    Intake/Output Summary (Last 24 hours) at 5/22/2024 1016  Last data filed at 5/21/2024 2346  Gross per 24 hour   Intake 360 ml   Output 2500 ml   Net -2140 ml         Physical Exam  Vitals reviewed.   Constitutional:       General: He is not in acute distress.     Appearance: Normal appearance. He is not ill-appearing.   HENT:      Head: Normocephalic and atraumatic.      Nose: Nose normal. No congestion.      Mouth/Throat:      Pharynx: Oropharynx is clear.   Eyes:      Extraocular Movements: Extraocular movements intact.      Conjunctiva/sclera:  Conjunctivae normal.      Pupils: Pupils are equal, round, and reactive to light.   Cardiovascular:      Rate and Rhythm: Normal rate and regular rhythm.      Pulses: Normal pulses.      Heart sounds: Murmur heard.   Pulmonary:      Effort: Pulmonary effort is normal. No respiratory distress.      Comments: Basilar crackles  Abdominal:      General: Bowel sounds are normal. There is no distension.      Palpations: Abdomen is soft.      Tenderness: There is no abdominal tenderness.   Musculoskeletal:         General: No tenderness.      Right lower leg: Edema present.      Left lower leg: Edema present.   Lymphadenopathy:      Cervical: No cervical adenopathy.   Skin:     General: Skin is warm and dry.   Neurological:      Mental Status: He is alert and oriented to person, place, and time. Mental status is at baseline.   Psychiatric:         Mood and Affect: Mood normal.             Significant Labs: All pertinent labs within the past 24 hours have been reviewed.    Significant Imaging: I have reviewed all pertinent imaging results/findings within the past 24 hours.

## 2024-05-22 NOTE — ANESTHESIA PREPROCEDURE EVALUATION
05/22/2024  Tiny Gutierrez is a 78 y.o., male.    Past Medical History:   Diagnosis Date    Aortic heart murmur     Asthma     Cancer     Deep vein thrombosis     Emphysema/COPD     Hyperlipidemia     Ischemic cardiomyopathy     Mitral regurgitation     Old myocardial infarction 03/2001    Tricuspid regurgitation     Vitamin B 12 deficiency     Vitamin D deficiency        Past Surgical History:   Procedure Laterality Date    CARDIAC CATHETERIZATION      CARDIAC DEFIBRILLATOR PLACEMENT      CHOLECYSTECTOMY      CORONARY ARTERY BYPASS GRAFT      TOTAL KNEE ARTHROPLASTY Bilateral        Family History   Problem Relation Name Age of Onset    Emphysema Father         Social History     Socioeconomic History    Marital status:    Tobacco Use    Smoking status: Never     Passive exposure: Never    Smokeless tobacco: Never   Substance and Sexual Activity    Alcohol use: Never    Drug use: Never    Sexual activity: Yes     Partners: Female     Social Determinants of Health     Financial Resource Strain: Low Risk  (4/18/2024)    Received from [facility] Its Subsidiaries and Affiliates    Overall Financial Resource Strain (CARDIA)     Difficulty of Paying Living Expenses: Not hard at all   Food Insecurity: No Food Insecurity (4/18/2024)    Received from [facility] and Its Subsidiaries and Affiliates    Hunger Vital Sign     Worried About Running Out of Food in the Last Year: Never true     Ran Out of Food in the Last Year: Never true   Transportation Needs: No Transportation Needs (4/18/2024)    Received from [facility] Subsidiaries and Aff[facility]    PRAPARE - Transportation     Lack of Transportation (Medical): No     Lack of Transportation (Non-Medical): No   Housing Stability:  Not At Risk (3/22/2024)    [facility]     Housing Insecurity: 1       Current Facility-Administered Medications   Medication Dose Route Frequency Provider Last Rate Last Admin    acetaminophen tablet 650 mg  650 mg Oral Q8H Bebeto Encarnacion MD        acetaminophen tablet 650 mg  650 mg Oral Q4H PRN Bebeto Flanagan MD        albuterol nebulizer solution 2.5 mg  2.5 mg Nebulization Q4H PRN Bebeto Flanagan MD        albuterol nebulizer solution 2.5 mg  2.5 mg Nebulization Q6H Bebeto Flanagan MD   2.5 mg at 05/22/24 1251    aluminum-magnesium hydroxide-simethicone 200-200-20 mg/5 mL suspension 30 mL  30 mL Oral QID PRN Bebeto Flanagan MD        atorvastatin tablet 40 mg  40 mg Oral QHS Bebeto Flanagan MD   40 mg at 05/21/24 2113    bisacodyL suppository 10 mg  10 mg Rectal Daily PRN Bebeto Flanagan MD        budesonide nebulizer solution 0.5 mg  0.5 mg Nebulization Q12H Bebeto Flanagan MD   0.5 mg at 05/22/24 0803    dextrose 10% bolus 125 mL 125 mL  12.5 g Intravenous PRBebeto Martinez MD        dextrose 10% bolus 250 mL 250 mL  25 g Intravenous PRN Bebeto Flanagan MD        furosemide injection 60 mg  60 mg Intravenous Q12H Bebeto Flanagan MD   60 mg at 05/22/24 0826    glucagon (human recombinant) injection 1 mg  1 mg Intramuscular PRN Bebeto Flanagan MD        glucose chewable tablet 16 g  16 g Oral PRN Bebeto Flanagan MD        glucose chewable tablet 24 g  24 g Oral PRBebeto Martinez MD        lipase-protease-amylase 12,000-38,000-60,000 units per capsule 1 capsule  1 capsule Oral TID WM Bebeto Flanagan MD        LORazepam tablet 0.5 mg  0.5 mg Oral Q6H PRN Caleb Anderson MD   0.5 mg at 05/22/24 0212    melatonin tablet 6 mg  6 mg Oral Nightly PRN Bebeto Flanagan MD        naloxone 0.4 mg/mL injection 0.02 mg  0.02 mg Intravenous Bebeto Encarnacion MD        ondansetron injection 4 mg  4 mg Intravenous Q8H Bebeto Encarnacion MD         pantoprazole injection 40 mg  40 mg Intravenous BID Bebeto Flanagan MD   40 mg at 05/22/24 0826    polyethylene glycol packet 17 g  17 g Oral Daily Bebeto Flanagan MD   17 g at 05/21/24 1811    potassium bicarbonate disintegrating tablet 50 mEq  50 mEq Oral TID Bebeto Flanagan MD   50 mEq at 05/22/24 1001    sacubitriL-valsartan 24-26 mg per tablet 1 tablet  1 tablet Oral BID Bebeto Flanagan MD        senna-docusate 8.6-50 mg per tablet 1 tablet  1 tablet Oral BID Bebeto Flanagan MD        sertraline tablet 25 mg  25 mg Oral Daily Bebeto Flanagan MD   25 mg at 05/22/24 0826    simethicone chewable tablet 80 mg  1 tablet Oral QID PRN Bebeto Flanagan MD        spironolactone tablet 50 mg  50 mg Oral Daily Bebeto Flanagan MD   50 mg at 05/21/24 1810       Review of patient's allergies indicates:   Allergen Reactions    Hay fever and allergy relief Rash    Pollen extracts Rash      Pre-op Assessment    I have reviewed the Patient Summary Reports.     I have reviewed the Nursing Notes. I have reviewed the NPO Status.   I have reviewed the Medications.     Review of Systems  Anesthesia Hx:  No problems with previous Anesthesia             Denies Family Hx of Anesthesia complications.    Denies Personal Hx of Anesthesia complications.                    Hematology/Oncology:    Oncology Normal                                   EENT/Dental:  EENT/Dental Normal           Cardiovascular:     Hypertension  Past MI CAD       CHF    hyperlipidemia                             Pulmonary:   COPD Asthma                    Renal/:  Chronic Renal Disease                Hepatic/GI:     GERD Liver Disease,            Musculoskeletal:  Arthritis               Neurological:  Neurology Normal                                      Endocrine:  Diabetes, type 2           Dermatological:  Skin Normal    Psych:  Psychiatric History                  Physical Exam  General: Well nourished, Alert, Oriented and Cooperative    Airway:  Mouth  Opening: Normal  Neck ROM: Normal ROM    Chest/Lungs:  Normal Respiratory Rate    Heart:  Rate: Normal        Anesthesia Plan  Type of Anesthesia, risks & benefits discussed:    Anesthesia Type: Gen Natural Airway, MAC  Intra-op Monitoring Plan: Standard ASA Monitors  Post Op Pain Control Plan: multimodal analgesia and IV/PO Opioids PRN  Induction:  IV  Informed Consent: Informed consent signed with the Patient and all parties understand the risks and agree with anesthesia plan.  All questions answered. Patient consented to blood products? Yes  ASA Score: 3  Day of Surgery Review of History & Physical: I have interviewed and examined the patient. I have reviewed the patient's H&P dated: There are no significant changes.     Ready For Surgery From Anesthesia Perspective.     .

## 2024-05-22 NOTE — PLAN OF CARE
[facility]  Initial Discharge Assessment       Primary Care Provider: Raymon Tyler MD    Admission Diagnosis: Hypokalemia [E87.6]  CHF (congestive heart failure) [I50.9]  Ischemic cardiomyopathy [I25.5]  LV (left ventricular) mural thrombus [I51.3]  Chest pain [R07.9]  Bilateral lower extremity edema [R60.0]  Gastrointestinal hemorrhage, unspecified gastrointestinal hemorrhage type [K92.2]  Anemia, unspecified type [D64.9]  Acute midline thoracic back pain [M54.6]    Admission Date: 5/21/2024  Expected Discharge Date:     Transition of Care Barriers: None    Payor: MEDICARE / Plan: MEDICARE PART A & B / Product Type: Government /     Extended Emergency Contact Information  Primary Emergency Contact: Taylor Gutierrez  Mobile Phone: [phone]  Relation: Spouse  Preferred language: English   needed? No    Discharge Plan A: Home with family, Home Health  Discharge Plan B: Home with family, Home Health      [place] [id number]  Phone: [phone] Fax: [phone]      Initial Assessment (most recent)       Adult Discharge Assessment - 05/22/24 1008          Discharge Assessment    Assessment Type Discharge Planning Assessment     Confirmed/corrected address, phone number and insurance Yes     Confirmed Demographics Correct on Facesheet     Source of Information patient;family     People in Home spouse     Do you expect to return to your current living situation? Yes     Do you have help at home or someone to help you manage your care at home? Yes     Who are your caregiver(s) and their phone number(s)? Taylor Gutierrez, wife, [phone]     Prior to hospitilization cognitive status: Unable to Assess     Current cognitive status: Alert/Oriented     Walking or Climbing Stairs Difficulty yes     Walking or Climbing Stairs ambulation difficulty, requires equipment;stair climbing difficulty, requires  equipment     Mobility Management Cane, rw, rollator     Dressing/Bathing Difficulty no     Home Accessibility wheelchair accessible     Home Layout Able to live on 1st floor     Equipment Currently Used at Home cane, straight;walker, rolling;rollator;oxygen   has oxygen available for use as needed, but does not use daily    Readmission within 30 days? No     Patient currently being followed by outpatient case management? No     Do you currently have service(s) that help you manage your care at home? Yes     Name and Contact number of agency [facility]     Is the pt/caregiver preference to resume services with current agency Yes     Do you take prescription medications? Yes     Do you have prescription coverage? Yes     Coverage Medicare     Do you have any problems affording any of your prescribed medications? No     Is the patient taking medications as prescribed? yes     Who is going to help you get home at discharge? Taylor, wife     How do you get to doctors appointments? car, drives self;family or friend will provide     Are you on dialysis? No     Do you take coumadin? No     Discharge Plan A Home with family;Home Health     Discharge Plan B Home with family;Home Health     DME Needed Upon Discharge  none     Discharge Plan discussed with: Patient;Spouse/sig other     Name(s) and Number(s) Taylor [phone]     Transition of Care Barriers None                      SS spoke with pt and his wife, Taylor, at bedside. Pt lives at home with his wife and is current with [facility] health. Pt's plan is to return to his current living arrangements when medically ready for dc. SS obtained choice to resume with [facility], faxed initial info, and notified Shira. Pt has all needed DME including oxygen to use as needed. IM obtained. SDOH completed April 2024. SS following for anticipated dc needs.

## 2024-05-22 NOTE — CONSULTS
Gastroenterology Consult Note    Chief Complaint: rectal bleeding     Consulted by:   Dr. Flanagan     HPI:  Tiny Gutierrez is a 78 y.o. WM with MMP including HFrEF (25%, AICD, complicated by LV thrombus on Eliquis), CAD s/p CABG, Pancreatic CA s/p remote whipple, & possibly decompensated (ascites) cirrhosis that presents with worsening edema. Patient denies hematemesis, N/V, NSAIDs, alcohol/illicits (current/past), abdominal pain, confusion. He and wife report being told in [place] that he had cirrhosis in the last couple of years. Patient has been on anticoagulation for LV thrombus and reports having painless rectal bleeding, intermittently with clots; they have been referred to GI as outpatient but not yet evaluated. No [facility] CRC or cirrhosis in [facility]. Last colonoscopy in 2014. Has had EGD in 2023 but report/findings not available.     Past Medical History:   Diagnosis Date    Aortic heart murmur     Asthma     Cancer     Deep vein thrombosis     Emphysema/COPD     Hyperlipidemia     Ischemic cardiomyopathy     Mitral regurgitation     Old myocardial infarction 03/2001    Tricuspid regurgitation     Vitamin B 12 deficiency     Vitamin D deficiency      Past Surgical History:   Procedure Laterality Date    CARDIAC CATHETERIZATION      CARDIAC DEFIBRILLATOR PLACEMENT      CHOLECYSTECTOMY      CORONARY ARTERY BYPASS GRAFT      TOTAL KNEE ARTHROPLASTY Bilateral      Family History   Problem Relation Name Age of Onset    Emphysema Father         OBJECTIVE:  /66   Pulse 64   Temp 97.6 °F (36.4 °C) (Oral)   Resp 16   Ht 6' (1.829 m)   Wt 92.5 kg (204 lb)   SpO2 100%   BMI 27.67 kg/m²   GEN: chronically il appearing, cooperative  HEENT: NCAT, OP benign, MMM  NECK: Supple, no LAD  CV: normal rate, regular rhythm  RESP: CTA anteriorly, unlabored  ABD: NABS, probuterant, NT, soft, no guarding  EXT: No clubbing, cyanosis; BLE edema   SKIN: Warm and dry  NEURO: AAO x3. Afocal. No asterixis     LABS:  CMP  Sodium    Date Value Ref Range Status   05/21/2024 141 136 - 145 mmol/L Final   04/19/2024 141 136 - 145 mmol/L Final     Potassium   Date Value Ref Range Status   05/21/2024 3.2 (L) 3.5 - 5.1 mmol/L Final   04/19/2024 3.4 (L) 3.5 - 5.1 mmol/L Final     Comment:     slight hemolysis     Chloride   Date Value Ref Range Status   05/21/2024 105 98 - 107 mmol/L Final   04/19/2024 106 100 - 109 mmol/L Final     CO2   Date Value Ref Range Status   05/21/2024 30 21 - 32 mmol/L Final     Carbon Dioxide   Date Value Ref Range Status   04/19/2024 27 22 - 33 mmol/L Final     Glucose   Date Value Ref Range Status   05/21/2024 110 (H) 74 - 106 mg/dL Final     BUN   Date Value Ref Range Status   05/21/2024 23 (H) 7 - 18 mg/dL Final     Blood Urea Nitrogen   Date Value Ref Range Status   04/19/2024 18 5 - 25 mg/dL Final     Creatinine   Date Value Ref Range Status   05/21/2024 1.14 0.70 - 1.30 mg/dL Final   04/19/2024 0.99 0.73 - 1.18 mg/dL Final     Calcium   Date Value Ref Range Status   05/21/2024 8.0 (L) 8.5 - 10.1 mg/dL Final   04/19/2024 8.8 8.8 - 10.6 mg/dL Final     Total Protein   Date Value Ref Range Status   05/21/2024 5.8 (L) 6.4 - 8.2 g/dL Final     Albumin   Date Value Ref Range Status   05/21/2024 2.2 (L) 3.5 - 5.0 g/dL Final     Bilirubin, Total   Date Value Ref Range Status   05/21/2024 0.4 >0.0 - 1.2 mg/dL Final     Alk Phos   Date Value Ref Range Status   05/21/2024 117 (H) 45 - 115 U/L Final     AST   Date Value Ref Range Status   05/21/2024 22 15 - 37 U/L Final     ALT   Date Value Ref Range Status   05/21/2024 21 16 - 61 U/L Final     Anion Gap   Date Value Ref Range Status   05/21/2024 9 7 - 16 mmol/L Final   04/19/2024 8 8 - 16 mmol/L Final     eGFR   Date Value Ref Range Status   05/21/2024 65 >=60 mL/min/1.73m2 Final     Recent Results (from the past 336 hour(s))   CBC with Differential    Collection Time: 05/21/24 10:49 AM   Result Value Ref Range    WBC 3.82 (L) 4.50 - 11.00 K/uL    Hemoglobin 8.3 (L) 13.5 -  18.0 g/dL    Hematocrit 26.4 (L) 40.0 - 54.0 %    Platelet Count 113 (L) 150 - 400 K/uL     Lab Results   Component Value Date    INR 1.15 05/21/2024    INR 1.2 04/18/2024    INR 1.2 03/26/2024       IMAGING:  No pertinent imaging this admission; chart reviewed and notable for CT [facility] in [facility] with cirrhotic appearing liver with ascites and postoperative changes of whipple     ASSESSMENT:    78 y.o. WM with MMP including HFrEF (25%, AICD, complicated by LV thrombus on Eliquis), CAD s/p CABG, Pancreatic CA s/p remote whipple, & possibly decompensated (ascites) cirrhosis that presents with worsening edema.    PLAN:    Acute on Chronic GI Hemorrhage  - hematochezia with clots, Eliquis, h/o cirrhosis?, FOBT +, BUN 23  - VS stable; last reported bleeding was yesterday per wife  - agree with monitoring H&H, pRBC as needed, PPI  - likely a LGI source given history, but given he is on Eliquis and with possible underlying decompensated cirrhosis, I think an EGD is warranted to r/o varices   - NPO p MN for EGD tomorrow; will monitor clinical status/labs to see if colonoscopy warranted as inpatient or if we could arrange for this as outpatient in an expedited fashion       Decompensated (ascites) Cirrhosis  - Etiology: unclear, likely BA  - seems that cirrhosis diagnosis is relatively new for the patient over the last 6 months from tracking in care everywhere; supported by cirrhotic appearing liver on imaging, ascites with fluid studies c/w portal htn, splenomegaly, thrombocytopenia   - Vaccinations: check HAV/HBV status; recommend vaccination if not immune  - MELD: 11  - Ascites: paracentesis studies reviewed in care everywhere and appear consistent with portal hypertension; negative for SBP; already on diuretics managed by Cardiology     - Varices: NPO p MN for EGD tomorrow   - Encephalopathy: no overt HE today   - HCC Surveilance: would benefit from cross sectional imaging as outpatient with liver mass  protocol; check AFP      Thank you for the consult and including me in the care of this patient. Please call me with questions.     Kaleb Smith MD  Gastroenterology

## 2024-05-22 NOTE — PLAN OF CARE
Problem: Occupational Therapy  Goal: Occupational Therapy Goal  Description: STG:  Pt will perform grooming with setup  Pt will bathe with Mod I  Pt will perform UE dressing with I'ly  Pt will perform LE dressing with Mod I  Pt will sit EOB x 15 min with no assistance  Pt will transfer bed/chair/bsc with RW and supervision  Pt will perform standing task x 15 min with supervision assistance  Pt will tolerate 30 minutes of tx without fatigue      LT.Restore to max I with self care and mobility.    Outcome: Progressing

## 2024-05-22 NOTE — PROGRESS NOTES
[facility] Medicine  Progress Note    Patient Name: Tiny Gutierrez  MRN: [id number]  Patient Class: IP- Inpatient   Admission Date: 5/21/2024  Length of Stay: 1 days  Attending Physician: Bebeto Flanagan MD  Primary Care Provider: Raymon Tyler MD        Subjective:     Principal Problem:Ischemic cardiomyopathy        HPI:  78 y.o. male with PMH significant for anemia, asthma, previous CVA, chronic systolic CHF (with EF of 25% and apical thrombus on most recent ECHO) and history of pancreatic cancer s/p Whipple procedure as well as CAD s/p CABG, AICD, who presents to the hospital with worsening lower extremity edema.  Patient is accompanied by his wife, patient and wife state that he has been having increasing lower extremity swelling without any shortness of breath.  Patient has had multiple hospitalizations for the same reason, he endorses complete compliance to his medications.  He also endorses with complete compliance to dietary and fluid restriction.  Of note patient and wife mentioned that he has been having red stools along with clots off and on, he had his last bowel movement yesterday which did not include any bleeding.  However fecal occult done by the ER staff was positive.  Patient will be admitted for lower extremity edema and workup of GI bleeding.    Overview/Hospital Course:  No notes on file    Interval History:     Pt w/ working therapy  Says swelling is better  Says he had a BM today w/ no blood in it  Per GI will need EGD today  Awaiting cardiology eval  If h/h remains stable will restart elequis, pending gi eval.   Replace potassium for hypokalemia.     Review of Systems   Constitutional:  Positive for fatigue. Negative for chills and fever.   HENT: Negative.  Negative for congestion and rhinorrhea.    Respiratory:  Negative for apnea, cough, chest tightness and shortness of breath.    Cardiovascular: Negative.  Negative for chest pain and leg swelling.    Gastrointestinal:  Positive for blood in stool. Negative for abdominal pain, diarrhea, nausea and vomiting.   Endocrine: Negative.    Genitourinary: Negative.    Musculoskeletal:  Positive for arthralgias.   Skin: Negative.    Allergic/Immunologic: Negative.    Neurological:  Positive for weakness.   Hematological: Negative.    Psychiatric/Behavioral: Negative.       Objective:     Vital Signs (Most Recent):  Temp: 98.1 °F (36.7 °C) (05/22/24 0801)  Pulse: 70 (05/22/24 0807)  Resp: 16 (05/22/24 0807)  BP: (!) 95/59 (05/22/24 0801)  SpO2: 98 % (05/22/24 0807) Vital Signs (24h Range):  Temp:  [97.6 °F (36.4 °C)-98.2 °F (36.8 °C)] 98.1 °F (36.7 °C)  Pulse:  [56-74] 70  Resp:  [12-18] 16  SpO2:  [95 %-100 %] 98 %  BP: ()/(51-66) 95/59     Weight: 92.5 kg (204 lb)  Body mass index is 27.67 kg/m².    Intake/Output Summary (Last 24 hours) at 5/22/2024 1016  Last data filed at 5/21/2024 2346  Gross per 24 hour   Intake 360 ml   Output 2500 ml   Net -2140 ml         Physical Exam  Vitals reviewed.   Constitutional:       General: He is not in acute distress.     Appearance: Normal appearance. He is not ill-appearing.   HENT:      Head: Normocephalic and atraumatic.      Nose: Nose normal. No congestion.      Mouth/Throat:      Pharynx: Oropharynx is clear.   Eyes:      Extraocular Movements: Extraocular movements intact.      Conjunctiva/sclera: Conjunctivae normal.      Pupils: Pupils are equal, round, and reactive to light.   Cardiovascular:      Rate and Rhythm: Normal rate and regular rhythm.      Pulses: Normal pulses.      Heart sounds: Murmur heard.   Pulmonary:      Effort: Pulmonary effort is normal. No respiratory distress.      Comments: Basilar crackles  Abdominal:      General: Bowel sounds are normal. There is no distension.      Palpations: Abdomen is soft.      Tenderness: There is no abdominal tenderness.   Musculoskeletal:         General: No tenderness.      Right lower leg: Edema present.       Left lower leg: Edema present.   Lymphadenopathy:      Cervical: No cervical adenopathy.   Skin:     General: Skin is warm and dry.   Neurological:      Mental Status: He is alert and oriented to person, place, and time. Mental status is at baseline.   Psychiatric:         Mood and Affect: Mood normal.             Significant Labs: All pertinent labs within the past 24 hours have been reviewed.    Significant Imaging: I have reviewed all pertinent imaging results/findings within the past 24 hours.    Assessment/Plan:      * Ischemic cardiomyopathy    3/29/24 Echo   Normal LV size with severely reduced systolic function.  Estimated LVEF   25%.   There is akinesis of the mid to apical anteroseptal wall and entire apex.   There is a small apical thrombus on contrasted images.   No significant valvular abnormalities.   Moderate biatrial enlargement.     Compared to prior study in July 2021, there has been little change.    Potential apical thrombus mentioned on that study as well.     Left Ventricle   Normal wall thickness observed. Moderate to severe (25-30%) ejection fraction. Left ventricular diastolic dysfunction. There is mid to apical anteroseptal wall and apical akinesis. There is a probable small thrombus present. The thrombus is located in the apex.     Right Ventricle   The right ventricular cavity size is normal. Low normal systolic function.     Left Atrium   Left atrium is moderately dilated.     Right Atrium   Right atrium is moderately dilated.     IVC/SVC   Normal central venous pressure (0-5mm Hg).     Mitral Valve   There is mild valve leaflet calcification. No stenosis. Trace mitral regurgitation.     Tricuspid Valve   Tricsupid valve appears grossly normal. Trace tricuspid regurgitation. Pulmonary hypertension absent. RVSP is 28.00.     Aortic Valve   The valve is tricuspid. There is calcification with reduced excursion of the aortic valve present.No aortic stenosis. No aortic regurgitation.      Pulmonic Valve   Normal valve structure. Trace transvalvular regurgitation. No stenosis.     Ascending Aorta   The sinus of Valsalva is normal. The sinotubular junction is normal. The ascending aorta is not well visualized.     Pericardium   No pericardial effusion.     >continue GDMT  >Pt w/ elevation in bnp, though lower than previous value  >consult cardiology  >start iv lasix 60 mg bid    GI bleed  Patient has acute blood loss due to hemorrhage, the hemorrhage is due to gastrointestinal bleed, patient does have a propensity for bleeding due to a medication, the medication is elequis.. Will trend hemoglobin/hematocrit Every 6 hours, as well as monitor and correct for any coagulation defects. CBC and vital signs have been reviewed and last CBC was noted-   Lab Results   Component Value Date    WBC 3.82 (L) 05/21/2024    HGB 8.3 (L) 05/21/2024    HCT 26.4 (L) 05/21/2024    MCV 98.9 (H) 05/21/2024     (L) 05/21/2024         Will order a type and screen and consent patient for blood transfusion. Will transfuse if Hgb is <7g/dl (<8g/dl in cases of active ACS) or if patient has rapid bleeding leading to hemodynamic instability.    Left ventricular thrombus  Was recently diagnosed and tapered to elequis 2.5 mg bid per wife  However d/t drop in hb 10>8.3, will hold off to any AC pending GI evaluation, Cardiology on board, will get repeat echo.       Malignant neoplasm of pancreas  S/p whipples procedure  Continue creon w/ meals  Per wife he is in remission, follows w/ Oncologist in [place]      Moderate persistent asthma  Cont inhalers      Mixed hyperlipidemia  statin      Primary hypertension  Chronic, controlled. Latest blood pressure and vitals reviewed-     Temp:  [97.9 °F (36.6 °C)]   Pulse:  [67-96]   Resp:  [12-18]   BP: ()/(45-58)   SpO2:  [96 %] .   Home meds for hypertension were reviewed and noted below.   Hypertension Medications               furosemide (LASIX) 40 MG tablet Take 40 mg by  mouth 2 (two) times daily.    sacubitriL-valsartan (ENTRESTO) 24-26 mg per tablet Take 1 tablet by mouth 2 (two) times daily.    spironolactone (ALDACTONE) 50 MG tablet Take 50 mg by mouth Daily.    sacubitriL-valsartan (ENTRESTO) 24-26 mg per tablet Take 1 tablet by mouth 2 (two) times daily.            While in the hospital, will manage blood pressure as follows; Continue home antihypertensive regimen    Will utilize p.r.n. blood pressure medication only if patient's blood pressure greater than 180/110 and he develops symptoms such as worsening chest pain or shortness of breath.      VTE Risk Mitigation (From admission, onward)           Ordered     IP VTE HIGH RISK PATIENT  Once         05/21/24 1552     Place sequential compression device  Until discontinued         05/21/24 1552                    Discharge Planning   KYLEE:      Code Status: Full Code   Is the patient medically ready for discharge?:     Reason for patient still in hospital (select all that apply): Treatment  Discharge Plan A: Home with family, Home Health                  Rehmat HARJINDER Flanagan MD  Department of Hospital Medicine   [facility]

## 2024-05-23 ENCOUNTER — ANESTHESIA EVENT (OUTPATIENT)
Dept: GASTROENTEROLOGY | Facility: HOSPITAL | Age: 79
DRG: 377 | End: 2024-05-23
Payer: MEDICARE

## 2024-05-23 ENCOUNTER — ANESTHESIA (OUTPATIENT)
Dept: GASTROENTEROLOGY | Facility: HOSPITAL | Age: 79
DRG: 377 | End: 2024-05-23
Payer: MEDICARE

## 2024-05-23 VITALS
OXYGEN SATURATION: 100 % | DIASTOLIC BLOOD PRESSURE: 62 MMHG | SYSTOLIC BLOOD PRESSURE: 100 MMHG | TEMPERATURE: 98 F | HEART RATE: 65 BPM | RESPIRATION RATE: 16 BRPM | BODY MASS INDEX: 27.63 KG/M2 | WEIGHT: 204 LBS | HEIGHT: 72 IN

## 2024-05-23 LAB
ALBUMIN SERPL BCP-MCNC: 2.3 G/DL (ref 3.5–5)
ALBUMIN/GLOB SERPL: 0.7 {RATIO}
ALP SERPL-CCNC: 84 U/L (ref 45–115)
ALT SERPL W P-5'-P-CCNC: 16 U/L (ref 16–61)
ANION GAP SERPL CALCULATED.3IONS-SCNC: 9 MMOL/L (ref 7–16)
AST SERPL W P-5'-P-CCNC: 34 U/L (ref 15–37)
BASOPHILS # BLD AUTO: 0.02 K/UL (ref 0–0.2)
BASOPHILS NFR BLD AUTO: 0.6 % (ref 0–1)
BILIRUB SERPL-MCNC: 0.5 MG/DL (ref ?–1.2)
BUN SERPL-MCNC: 22 MG/DL (ref 7–18)
BUN/CREAT SERPL: 21 (ref 6–20)
CALCIUM SERPL-MCNC: 8.3 MG/DL (ref 8.5–10.1)
CHLORIDE SERPL-SCNC: 106 MMOL/L (ref 98–107)
CO2 SERPL-SCNC: 30 MMOL/L (ref 21–32)
CREAT SERPL-MCNC: 1.07 MG/DL (ref 0.7–1.3)
DIFFERENTIAL METHOD BLD: ABNORMAL
EGFR (NO RACE VARIABLE) (RUSH/TITUS): 70 ML/MIN/1.73M2
EOSINOPHIL # BLD AUTO: 0.1 K/UL (ref 0–0.5)
EOSINOPHIL NFR BLD AUTO: 3.1 % (ref 1–4)
ERYTHROCYTE [DISTWIDTH] IN BLOOD BY AUTOMATED COUNT: 17.2 % (ref 11.5–14.5)
GLOBULIN SER-MCNC: 3.1 G/DL (ref 2–4)
GLUCOSE SERPL-MCNC: 99 MG/DL (ref 74–106)
HCT VFR BLD AUTO: 24.6 % (ref 40–54)
HGB BLD-MCNC: 7.9 G/DL (ref 13.5–18)
IMM GRANULOCYTES # BLD AUTO: 0 K/UL (ref 0–0.04)
IMM GRANULOCYTES NFR BLD: 0 % (ref 0–0.4)
LYMPHOCYTES # BLD AUTO: 0.93 K/UL (ref 1–4.8)
LYMPHOCYTES NFR BLD AUTO: 28.5 % (ref 27–41)
MAGNESIUM SERPL-MCNC: 2 MG/DL (ref 1.7–2.3)
MCH RBC QN AUTO: 31 PG (ref 27–31)
MCHC RBC AUTO-ENTMCNC: 32.1 G/DL (ref 32–36)
MCV RBC AUTO: 96.5 FL (ref 80–96)
MONOCYTES # BLD AUTO: 0.45 K/UL (ref 0–0.8)
MONOCYTES NFR BLD AUTO: 13.8 % (ref 2–6)
MPC BLD CALC-MCNC: 10.7 FL (ref 9.4–12.4)
NEUTROPHILS # BLD AUTO: 1.76 K/UL (ref 1.8–7.7)
NEUTROPHILS NFR BLD AUTO: 54 % (ref 53–65)
NRBC # BLD AUTO: 0 X10E3/UL
NRBC, AUTO (.00): 0 %
PHOSPHATE SERPL-MCNC: 3.6 MG/DL (ref 2.5–4.5)
PLATELET # BLD AUTO: 109 K/UL (ref 150–400)
POTASSIUM SERPL-SCNC: 3.1 MMOL/L (ref 3.5–5.1)
PROT SERPL-MCNC: 5.4 G/DL (ref 6.4–8.2)
RBC # BLD AUTO: 2.55 M/UL (ref 4.6–6.2)
SODIUM SERPL-SCNC: 142 MMOL/L (ref 136–145)
WBC # BLD AUTO: 3.26 K/UL (ref 4.5–11)

## 2024-05-23 PROCEDURE — D9220A PRA ANESTHESIA: Mod: ,,, | Performed by: NURSE ANESTHETIST, CERTIFIED REGISTERED

## 2024-05-23 PROCEDURE — 99900035 HC TECH TIME PER 15 MIN (STAT)

## 2024-05-23 PROCEDURE — 63600175 PHARM REV CODE 636 W HCPCS: Performed by: INTERNAL MEDICINE

## 2024-05-23 PROCEDURE — 83735 ASSAY OF MAGNESIUM: CPT | Performed by: INTERNAL MEDICINE

## 2024-05-23 PROCEDURE — 94640 AIRWAY INHALATION TREATMENT: CPT

## 2024-05-23 PROCEDURE — 80053 COMPREHEN METABOLIC PANEL: CPT | Performed by: INTERNAL MEDICINE

## 2024-05-23 PROCEDURE — 45382 COLONOSCOPY W/CONTROL BLEED: CPT | Mod: ,,, | Performed by: INTERNAL MEDICINE

## 2024-05-23 PROCEDURE — 63600175 PHARM REV CODE 636 W HCPCS: Performed by: NURSE ANESTHETIST, CERTIFIED REGISTERED

## 2024-05-23 PROCEDURE — 97116 GAIT TRAINING THERAPY: CPT | Mod: CQ

## 2024-05-23 PROCEDURE — 99232 SBSQ HOSP IP/OBS MODERATE 35: CPT | Mod: ,,, | Performed by: INTERNAL MEDICINE

## 2024-05-23 PROCEDURE — 45382 COLONOSCOPY W/CONTROL BLEED: CPT | Performed by: INTERNAL MEDICINE

## 2024-05-23 PROCEDURE — 25000242 PHARM REV CODE 250 ALT 637 W/ HCPCS: Performed by: INTERNAL MEDICINE

## 2024-05-23 PROCEDURE — 25000003 PHARM REV CODE 250: Performed by: NURSE ANESTHETIST, CERTIFIED REGISTERED

## 2024-05-23 PROCEDURE — 84100 ASSAY OF PHOSPHORUS: CPT | Performed by: INTERNAL MEDICINE

## 2024-05-23 PROCEDURE — 85025 COMPLETE CBC W/AUTO DIFF WBC: CPT | Performed by: INTERNAL MEDICINE

## 2024-05-23 PROCEDURE — 94761 N-INVAS EAR/PLS OXIMETRY MLT: CPT

## 2024-05-23 PROCEDURE — 36415 COLL VENOUS BLD VENIPUNCTURE: CPT | Performed by: INTERNAL MEDICINE

## 2024-05-23 PROCEDURE — 97110 THERAPEUTIC EXERCISES: CPT | Mod: CO

## 2024-05-23 PROCEDURE — 25000003 PHARM REV CODE 250: Performed by: INTERNAL MEDICINE

## 2024-05-23 PROCEDURE — 0W3P8ZZ CONTROL BLEEDING IN GASTROINTESTINAL TRACT, VIA NATURAL OR ARTIFICIAL OPENING ENDOSCOPIC: ICD-10-PCS | Performed by: INTERNAL MEDICINE

## 2024-05-23 RX ORDER — SACUBITRIL AND VALSARTAN 24; 26 MG/1; MG/1
1 TABLET, FILM COATED ORAL 2 TIMES DAILY
Qty: 60 TABLET | Refills: 0 | COMMUNITY
Start: 2024-05-23 | End: 2024-05-28 | Stop reason: SDUPTHER

## 2024-05-23 RX ORDER — LIDOCAINE HYDROCHLORIDE 20 MG/ML
INJECTION, SOLUTION EPIDURAL; INFILTRATION; INTRACAUDAL; PERINEURAL
Status: DISCONTINUED | OUTPATIENT
Start: 2024-05-23 | End: 2024-05-23

## 2024-05-23 RX ORDER — ETOMIDATE 2 MG/ML
INJECTION INTRAVENOUS
Status: DISCONTINUED | OUTPATIENT
Start: 2024-05-23 | End: 2024-05-23

## 2024-05-23 RX ORDER — DAPAGLIFLOZIN 10 MG/1
10 TABLET, FILM COATED ORAL DAILY
Qty: 30 TABLET | Refills: 0 | COMMUNITY
Start: 2024-05-23 | End: 2024-05-28 | Stop reason: SDUPTHER

## 2024-05-23 RX ORDER — POTASSIUM CHLORIDE 7.45 MG/ML
10 INJECTION INTRAVENOUS ONCE
Status: COMPLETED | OUTPATIENT
Start: 2024-05-23 | End: 2024-05-23

## 2024-05-23 RX ORDER — PROPOFOL 10 MG/ML
VIAL (ML) INTRAVENOUS
Status: DISCONTINUED | OUTPATIENT
Start: 2024-05-23 | End: 2024-05-23

## 2024-05-23 RX ORDER — POTASSIUM CHLORIDE 20 MEQ/1
20 TABLET, EXTENDED RELEASE ORAL DAILY
Qty: 7 TABLET | Refills: 0 | Status: SHIPPED | OUTPATIENT
Start: 2024-05-23 | End: 2024-05-30

## 2024-05-23 RX ORDER — FUROSEMIDE 40 MG/1
40 TABLET ORAL 2 TIMES DAILY
Qty: 28 TABLET | Refills: 0 | Status: SHIPPED | OUTPATIENT
Start: 2024-05-23 | End: 2024-06-06

## 2024-05-23 RX ADMIN — ALBUTEROL SULFATE 2.5 MG: 2.5 SOLUTION RESPIRATORY (INHALATION) at 12:05

## 2024-05-23 RX ADMIN — PROPOFOL 20 MG: 10 INJECTION, EMULSION INTRAVENOUS at 11:05

## 2024-05-23 RX ADMIN — ETOMIDATE 5 MG: 20 INJECTION, SOLUTION INTRAVENOUS at 11:05

## 2024-05-23 RX ADMIN — POTASSIUM BICARBONATE 50 MEQ: 977.5 TABLET, EFFERVESCENT ORAL at 01:05

## 2024-05-23 RX ADMIN — POTASSIUM CHLORIDE 10 MEQ: 7.46 INJECTION, SOLUTION INTRAVENOUS at 01:05

## 2024-05-23 RX ADMIN — LIDOCAINE HYDROCHLORIDE 80 MG: 20 INJECTION, SOLUTION INTRAVENOUS at 11:05

## 2024-05-23 RX ADMIN — ETOMIDATE 10 MG: 20 INJECTION, SOLUTION INTRAVENOUS at 11:05

## 2024-05-23 RX ADMIN — ALBUTEROL SULFATE 2.5 MG: 2.5 SOLUTION RESPIRATORY (INHALATION) at 07:05

## 2024-05-23 RX ADMIN — PANCRELIPASE 1 CAPSULE: 60000; 12000; 38000 CAPSULE, DELAYED RELEASE PELLETS ORAL at 02:05

## 2024-05-23 RX ADMIN — ALBUTEROL SULFATE 2.5 MG: 2.5 SOLUTION RESPIRATORY (INHALATION) at 01:05

## 2024-05-23 RX ADMIN — POTASSIUM BICARBONATE 50 MEQ: 977.5 TABLET, EFFERVESCENT ORAL at 04:05

## 2024-05-23 RX ADMIN — SODIUM CHLORIDE: 9 INJECTION, SOLUTION INTRAVENOUS at 11:05

## 2024-05-23 RX ADMIN — SPIRONOLACTONE 50 MG: 25 TABLET ORAL at 04:05

## 2024-05-23 RX ADMIN — PROPOFOL 80 MG: 10 INJECTION, EMULSION INTRAVENOUS at 11:05

## 2024-05-23 RX ADMIN — BUDESONIDE INHALATION 0.5 MG: 0.5 SUSPENSION RESPIRATORY (INHALATION) at 07:05

## 2024-05-23 NOTE — H&P
History & Physical - Short Stay  Gastroenterology      SUBJECTIVE:     Procedure: Colonoscopy    Chief Complaint/Indication for Procedure: Rectal Bleeding    PTA Medications   Medication Sig    albuterol (PROVENTIL) 2.5 mg /3 mL (0.083 %) nebulizer solution Take 3 mLs (2.5 mg total) by nebulization as needed for Wheezing or Shortness of Breath.    apixaban (ELIQUIS) 2.5 mg Tab Take 2.5 mg by mouth 2 (two) times daily.    atorvastatin (LIPITOR) 40 MG tablet Take 1 tablet (40 mg total) by mouth once daily. For CHOLESTEROL (Patient taking differently: Take 40 mg by mouth every evening. For CHOLESTEROL)    BREO ELLIPTA 200-25 mcg/dose DsDv diskus inhaler 1 puff once daily.    calcium carbonate/vitamin D3 (CALTRATE 600 + D ORAL) Take 1 capsule by mouth Daily.    CREON 24,000-76,000 -120,000 unit capsule Take 1 capsule by mouth 3 (three) times daily.    FEROSUL 325 mg (65 mg iron) Tab tablet Take 1 tablet by mouth 3 (three) times daily.    furosemide (LASIX) 40 MG tablet Take 40 mg by mouth 2 (two) times daily.    HYDROcodone-acetaminophen (NORCO) 7.5-325 mg per tablet Take 1 tablet by mouth every 6 (six) hours as needed for Pain.    LORazepam (ATIVAN) 0.5 MG tablet Take 1 tablet (0.5 mg total) by mouth every 12 (twelve) hours as needed (for ANXIETY and AGITATION).    magnesium oxide (MAG-OX) 400 mg (241.3 mg magnesium) tablet Take 1 tablet by mouth every morning.    ondansetron (ZOFRAN-ODT) 4 MG TbDL Take 1 tablet (4 mg total) by mouth every 8 (eight) hours as needed (NAUSEA).    sacubitriL-valsartan (ENTRESTO) 24-26 mg per tablet Take 1 tablet by mouth 2 (two) times daily.    sertraline (ZOLOFT) 25 MG tablet Take 1 tablet (25 mg total) by mouth once daily. (Patient taking differently: Take 12.5 mg by mouth once daily.)    spironolactone (ALDACTONE) 50 MG tablet Take 50 mg by mouth Daily.    albuterol (PROVENTIL/VENTOLIN HFA) 90 mcg/actuation inhaler 2 puffs as needed    cyanocobalamin 1,000 mcg/mL injection Inject 1,000  mcg into the muscle every 30 days.    dapagliflozin propanediol (FARXIGA) 10 mg tablet Take 1 tablet (10 mg total) by mouth once daily.    dapagliflozin propanediol (FARXIGA) 10 mg tablet Take 1 tablet (10 mg total) by mouth once daily.    EScitalopram oxalate (LEXAPRO) 10 MG tablet Take 10 mg by mouth once daily.    omeprazole (PRILOSEC) 40 MG capsule Take 1 capsule (40 mg total) by mouth 2 (two) times daily before meals. For STOMACH    sacubitriL-valsartan (ENTRESTO) 24-26 mg per tablet Take 1 tablet by mouth 2 (two) times daily.       Review of patient's allergies indicates:   Allergen Reactions    Hay fever and allergy relief Rash    Pollen extracts Rash        Past Medical History:   Diagnosis Date    Aortic heart murmur     Asthma     Cancer     Deep vein thrombosis     Emphysema/COPD     Hyperlipidemia     Ischemic cardiomyopathy     Mitral regurgitation     Old myocardial infarction 03/2001    Tricuspid regurgitation     Vitamin B 12 deficiency     Vitamin D deficiency      Past Surgical History:   Procedure Laterality Date    CARDIAC CATHETERIZATION      CARDIAC DEFIBRILLATOR PLACEMENT      CHOLECYSTECTOMY      CORONARY ARTERY BYPASS GRAFT      TOTAL KNEE ARTHROPLASTY Bilateral      Family History   Problem Relation Name Age of Onset    Emphysema Father       Social History     Tobacco Use    Smoking status: Never     Passive exposure: Never    Smokeless tobacco: Never   Substance Use Topics    Alcohol use: Never    Drug use: Never         OBJECTIVE:     Vital Signs (Most Recent)  Temp: 97.7 °F (36.5 °C) (05/23/24 1016)  Pulse: 67 (05/23/24 1016)  Resp: (!) 24 (05/23/24 1016)  BP: (!) 98/50 (05/23/24 1016)  SpO2: 98 % (05/23/24 1016)    Physical Exam:                                                       GENERAL:  Comfortable, in no acute distress.                                 HEENT EXAM:  Nonicteric.  No adenopathy.  Oropharynx is clear.               NECK:  Supple.                                                                LUNGS:  Clear.                                                               CARDIAC:  Regular rate and rhythm.  S1, S2.  No murmur.                      ABDOMEN:  Soft, positive bowel sounds, nontender.  No hepatosplenomegaly or masses.  No rebound or guarding.                                             EXTREMITIES:  No edema.     MENTAL STATUS:  Normal, alert and oriented.      ASSESSMENT/PLAN:     Assessment: Rectal Bleeding    Plan: Colonoscopy

## 2024-05-23 NOTE — ANESTHESIA PREPROCEDURE EVALUATION
05/23/2024  Tiny Gutierrez is a 78 y.o., male.    Past Medical History:   Diagnosis Date    Aortic heart murmur     Asthma     Cancer     Deep vein thrombosis     Emphysema/COPD     Hyperlipidemia     Ischemic cardiomyopathy     Mitral regurgitation     Old myocardial infarction 03/2001    Tricuspid regurgitation     Vitamin B 12 deficiency     Vitamin D deficiency        Past Surgical History:   Procedure Laterality Date    CARDIAC CATHETERIZATION      CARDIAC DEFIBRILLATOR PLACEMENT      CHOLECYSTECTOMY      CORONARY ARTERY BYPASS GRAFT      TOTAL KNEE ARTHROPLASTY Bilateral        Family History   Problem Relation Name Age of Onset    Emphysema Father         Social History     Socioeconomic History    Marital status:    Tobacco Use    Smoking status: Never     Passive exposure: Never    Smokeless tobacco: Never   Substance and Sexual Activity    Alcohol use: Never    Drug use: Never    Sexual activity: Yes     Partners: Female     Social Determinants of Health     Financial Resource Strain: Low Risk  (4/18/2024)    Received from [employer] [facility] Subsidi[facility] and Affili[facility]    Overall Financial Resource Strain (CARDIA)     Difficulty of Paying Living Expenses: Not hard at all   Food Insecurity: No Food Insecurity (4/18/2024)    Received from [employer] [facility]sidi[facility] and [facility]    Hunger Vital Sign     Worried About Running Out of Food in the Last Year: Never true     Ran Out of Food in the Last Year: Never true   Transportation Needs: No Transportation Needs (4/18/2024)    Received from [employer] [facility] and [facility]    PRAPARE - Transportation     Lack of Transportation (Medical): No     Lack of Transportation (Non-Medical): No   Housing Stability:  Not At Risk (3/22/2024)    Received from [facility] Housing Stability Source     Housing Insecurity: 1       Current Facility-Administered Medications   Medication Dose Route Frequency Provider Last Rate Last Admin    acetaminophen tablet 650 mg  650 mg Oral Q8H Bebeto Encarnacion MD        acetaminophen tablet 650 mg  650 mg Oral Q4H PRN Bebeto Flanagan MD        albuterol nebulizer solution 2.5 mg  2.5 mg Nebulization Q4H PRN Bebeto Flanagan MD        albuterol nebulizer solution 2.5 mg  2.5 mg Nebulization Q6H Bebeto Flanagan MD   2.5 mg at 05/23/24 0729    aluminum & magnesium hydroxide-simethicone 400-400-40 mg/5 mL suspension 15 mL  15 mL Oral QID PRN Bebeto Flanagan MD        atorvastatin tablet 40 mg  40 mg Oral QHS Bebeto Flanagan MD   40 mg at 05/22/24 2155    bisacodyL suppository 10 mg  10 mg Rectal Daily PRN Bebeto Flanagan MD        budesonide nebulizer solution 0.5 mg  0.5 mg Nebulization Q12H Bebeto Flanagan MD   0.5 mg at 05/23/24 0734    dextrose 10% bolus 125 mL 125 mL  12.5 g Intravenous PRBebeto Martinez MD        dextrose 10% bolus 250 mL 250 mL  25 g Intravenous PRN Bebeto Flanagan MD        furosemide injection 60 mg  60 mg Intravenous Q12H Bebeto Flanagan MD   60 mg at 05/22/24 2155    glucagon (human recombinant) injection 1 mg  1 mg Intramuscular PRN Bebeto Flanagan MD        glucose chewable tablet 16 g  16 g Oral PRN Bebeto Flanagan MD        glucose chewable tablet 24 g  24 g Oral PRBebeto Martinez MD        lipase-protease-amylase 12,000-38,000-60,000 units per capsule 1 capsule  1 capsule Oral TID WM Bebeto Flanagan MD        LORazepam tablet 0.5 mg  0.5 mg Oral Q6H PRN Caleb Anderson MD   0.5 mg at 05/22/24 0212    melatonin tablet 6 mg  6 mg Oral Nightly PRN Bebeto Flanagan MD   6 mg at 05/22/24 2210    naloxone 0.4 mg/mL injection 0.02 mg  0.02 mg Intravenous PRN Bebeto Flanagan MD        ondansetron injection 4 mg  4 mg Intravenous Q8H PRN  Bebeto Flanagan MD        pantoprazole injection 40 mg  40 mg Intravenous BID Bebeto Flanagan MD   40 mg at 05/22/24 2155    polyethylene glycol packet 17 g  17 g Oral Daily Bebeto Flanagan MD   17 g at 05/21/24 1811    potassium bicarbonate disintegrating tablet 50 mEq  50 mEq Oral TID Bebeto Flanagan MD        sacubitriL-valsartan 24-26 mg per tablet 1 tablet  1 tablet Oral BID Bebeto Flanagan MD   1 tablet at 05/22/24 2155    senna-docusate 8.6-50 mg per tablet 1 tablet  1 tablet Oral BID Bebeto Flanagan MD   1 tablet at 05/22/24 2155    sertraline tablet 25 mg  25 mg Oral Daily Bebeto Flanagan MD   25 mg at 05/22/24 0826    simethicone chewable tablet 80 mg  1 tablet Oral QID PRN Bebeto Flanagan MD        spironolactone tablet 50 mg  50 mg Oral Daily Bebeto Flanagan MD   50 mg at 05/22/24 1657       Review of patient's allergies indicates:   Allergen Reactions    Hay fever and allergy relief Rash    Pollen extracts Rash      Pre-op Assessment    I have reviewed the Patient Summary Reports.     I have reviewed the Nursing Notes. I have reviewed the NPO Status.   I have reviewed the Medications.     Review of Systems  Anesthesia Hx:  No problems with previous Anesthesia             Denies Family Hx of Anesthesia complications.    Denies Personal Hx of Anesthesia complications.                    Hematology/Oncology:    Oncology Normal                                   EENT/Dental:  EENT/Dental Normal           Cardiovascular:     Hypertension  Past MI CAD       CHF    hyperlipidemia       Coronary Artery Disease:          Hx of Myocardial Infarction     Congestive Heart Failure (CHF)                Hypertension     Atrial Flutter     Pulmonary:   COPD Asthma       Asthma:   Chronic Obstructive Pulmonary Disease (COPD):                      Renal/:  Chronic Renal Disease        Kidney Function/Disease             Hepatic/GI:     GERD Liver Disease,     Gerd       Liver Disease        Musculoskeletal:  Arthritis         Arthritis          Neurological:  Neurology Normal              Arthritis                           Endocrine:  Diabetes, type 2           Dermatological:  Skin Normal    Psych:  Psychiatric History                  Physical Exam  General: Well nourished, Alert, Oriented and Cooperative    Airway:  Mouth Opening: Normal  Neck ROM: Normal ROM    Chest/Lungs:  Normal Respiratory Rate    Heart:  Rate: Normal        Anesthesia Plan  Type of Anesthesia, risks & benefits discussed:    Anesthesia Type: Gen Natural Airway, MAC  Intra-op Monitoring Plan: Standard ASA Monitors  Post Op Pain Control Plan: multimodal analgesia and IV/PO Opioids PRN  Induction:  IV  Informed Consent: Informed consent signed with the Patient and all parties understand the risks and agree with anesthesia plan.  All questions answered. Patient consented to blood products? Yes  ASA Score: 3  Day of Surgery Review of History & Physical: I have interviewed and examined the patient. I have reviewed the patient's H&P dated: There are no significant changes.     Ready For Surgery From Anesthesia Perspective.     .

## 2024-05-23 NOTE — PT/OT/SLP PROGRESS
"Physical Therapy Treatment    Patient Name:  Tiny Gutierrez   MRN:  [id number]    Recommendations:     Discharge Recommendations: Low Intensity Therapy  Discharge Equipment Recommendations: none  Barriers to discharge: None    Assessment:     Tiny Gutierrez is a 78 y.o. male admitted with a medical diagnosis of Ischemic cardiomyopathy.  He presents with the following impairments/functional limitations: edema, gait instability.    Pt demo increased gait distance as compared to eval.  Pt is eager to return to home setting with possible discharge this date    PT POC discussed with Chiquis Lubin DPT     Rehab Prognosis: Good; patient would benefit from acute skilled PT services to address these deficits and reach maximum level of function.    Recent Surgery: * No surgery found *      Plan:     During this hospitalization, patient to be seen 5 x/week to address the identified rehab impairments via gait training, therapeutic activities, therapeutic exercises and progress toward the following goals:    Plan of Care Expires:  06/22/24    Subjective     Chief Complaint: ischemic cardiomyopathy  Patient/Family Comments/goals: "am I going home today"  Pain/Comfort:         Objective:     Communicated with Tea Cornejo RN prior to session.  Patient found sitting edge of bed with telemetry, peripheral IV upon PT entry to room.     General Precautions: Standard, fall  Orthopedic Precautions: N/A  Braces: N/A  Respiratory Status: Room air     Functional Mobility:  Transfers:     Sit to Stand:  stand by assistance with rolling walker  Gait: 276' with RW and standby to contact guard assist; slow john, decreased foot clearance, mild path deviation, mild cervical flexion      AM-PAC 6 CLICK MOBILITY  Turning over in bed (including adjusting bedclothes, sheets and blankets)?: 4  Sitting down on and standing up from a chair with arms (e.g., wheelchair, bedside commode, etc.): 4  Moving from lying on back to sitting on " the side of the bed?: 4  Moving to and from a bed to a chair (including a wheelchair)?: 4  Need to walk in hospital room?: 3  Climbing 3-5 steps with a railing?: 3  Basic Mobility Total Score: 22       Treatment & Education:      Patient left up in chair with all lines intact, call button in reach, and spouse present..    GOALS:   Multidisciplinary Problems       Physical Therapy Goals          Problem: Physical Therapy    Goal Priority Disciplines Outcome Goal Variances Interventions   Physical Therapy Goal     PT, PT/OT Progressing     Description: Short Term Goals to be met by: 2024    Patient will increase functional independence with mobility by performin. Supine to sit with independently  2. Sit to stand transfer with independently using Rolling walker  3. Bed to chair transfer with independently using Rolling walker  4. Gait  x 200 feet with independently using Rolling walker  5. Lower extremity exercise program x30 reps per handout, with assistance as needed    Long Term Goals to be met by: 2024    Pt will regain full independent functional mobility with lowest level of assistive device to return to home situation and prior activities of daily living.                        Time Tracking:     PT Received On: 24  PT Start Time: 1600     PT Stop Time: 1612  PT Total Time (min): 12 min     Billable Minutes: Gait Training 10    Treatment Type: Treatment  PT/PTA: PTA     Number of PTA visits since last PT visit: 2024

## 2024-05-23 NOTE — TRANSFER OF CARE
Anesthesia Transfer of Care Note    Patient: Tiny Gutierrez    Procedure(s) Performed: * No procedures listed *    Patient location: GI    Anesthesia Type: general (pt was not Arousable even with painful stimulation during the procedure)    Transport from OR: Transported from OR on room air with adequate spontaneous ventilation    Post pain: adequate analgesia    Post assessment: no apparent anesthetic complications    Post vital signs: stable    Level of consciousness: sedated and responds to stimulation    Nausea/Vomiting: no nausea/vomiting    Complications: none    Transfer of care protocol was followedComments: Good SV continue, NAD noted, VSS, RTRN       Last vitals: Visit Vitals  BP (!) 105/50 (BP Location: Right arm, Patient Position: Lying)   Pulse 75   Temp 36.6 °C (97.8 °F) (Oral)   Resp 15   Ht 6' (1.829 m)   Wt 92.5 kg (204 lb)   SpO2 98%   BMI 27.67 kg/m²

## 2024-05-23 NOTE — ANESTHESIA POSTPROCEDURE EVALUATION
Anesthesia Post Evaluation    Patient: Tiny Gutierrez    Procedure(s) Performed: * No procedures listed *    Final Anesthesia Type: general (The pt was Not Arousable even with painful stimulation during procedure)      Patient location during evaluation: GI PACU ([facility])  Patient participation: Yes- Able to Participate  Level of consciousness: awake and alert  Post-procedure vital signs: reviewed and stable  Pain management: adequate  Airway patency: patent    PONV status at discharge: No PONV  Anesthetic complications: no      Cardiovascular status: blood pressure returned to baseline, hemodynamically stable and stable  Respiratory status: unassisted  Hydration status: euvolemic  Follow-up not needed.  Comments: Refer to nursing note for pain/singh score upon discharge from recovery.               Vitals Value Taken Time   /57 05/23/24 1230   Temp 36.6 °C (97.8 °F) 05/23/24 1157   Pulse 61 05/23/24 1247   Resp 20 05/23/24 1247   SpO2 99 % 05/23/24 1247         Event Time   Out of Recovery 05/23/2024 12:42:11         Pain/Singh Score: Singh Score: 10 (5/23/2024 12:12 PM)

## 2024-05-23 NOTE — ANESTHESIA POSTPROCEDURE EVALUATION
Anesthesia Post Evaluation    Patient: Tiny Gutierrez    Procedure(s) Performed: * No procedures listed *    Final Anesthesia Type: MAC      Patient location during evaluation: GI PACU  Patient participation: Yes- Able to Participate  Level of consciousness: awake and alert  Post-procedure vital signs: reviewed and stable  Pain management: adequate  Airway patency: patent    PONV status at discharge: No PONV  Anesthetic complications: no      Cardiovascular status: blood pressure returned to baseline, hemodynamically stable and stable  Respiratory status: unassisted  Hydration status: euvolemic  Follow-up not needed.  Comments: Refer to nursing notes for pain/singh score upon discharge from recovery              Vitals Value Taken Time   BP 82/48 05/23/24 0040   Temp 36.5 °C (97.7 °F) 05/23/24 0040   Pulse 73 05/23/24 0113   Resp 20 05/23/24 0113   SpO2 97 % 05/23/24 0113         Event Time   Out of Recovery 05/22/2024 15:58:16         Pain/Singh Score: Singh Score: 10 (5/22/2024  3:41 PM)

## 2024-05-23 NOTE — PROGRESS NOTES
[facility]  [facility] Medicine  Progress Note    Patient Name: Tiny Gutierrez  MRN: [id number]  Patient Class: IP- Inpatient   Admission Date: 5/21/2024  Length of Stay: 2 days  Attending Physician: Bebeto Flanagan MD  Primary Care Provider: Raymon Tyler MD        Subjective:     Principal Problem:Ischemic cardiomyopathy        HPI:  78 y.o. male with PMH significant for anemia, asthma, previous CVA, chronic systolic CHF (with EF of 25% and apical thrombus on most recent ECHO) and history of pancreatic cancer s/p Whipple procedure as well as CAD s/p CABG, AICD, who presents to the hospital with worsening lower extremity edema.  Patient is accompanied by his wife, patient and wife state that he has been having increasing lower extremity swelling without any shortness of breath.  Patient has had multiple hospitalizations for the same reason, he endorses complete compliance to his medications.  He also endorses with complete compliance to dietary and fluid restriction.  Of note patient and wife mentioned that he has been having red stools along with clots off and on, he had his last bowel movement yesterday which did not include any bleeding.  However fecal occult done by the ER staff was positive.  Patient will be admitted for lower extremity edema and workup of GI bleeding.    Overview/Hospital Course:  No notes on file    Interval History:     Pt awaiting c scope, diureising well w/ improvement in swelling, per cardiology echo doesn't reveal any thrombus, hb slightly lower today.     Review of Systems   Constitutional:  Positive for fatigue. Negative for chills and fever.   HENT: Negative.  Negative for congestion and rhinorrhea.    Respiratory:  Negative for apnea, cough, chest tightness and shortness of breath.    Cardiovascular: Negative.  Negative for chest pain and leg swelling.   Gastrointestinal:  Positive for blood in stool. Negative for abdominal pain, diarrhea, nausea and  vomiting.   Endocrine: Negative.    Genitourinary: Negative.    Musculoskeletal:  Positive for arthralgias.   Skin: Negative.    Allergic/Immunologic: Negative.    Neurological:  Positive for weakness.   Hematological: Negative.    Psychiatric/Behavioral: Negative.       Objective:     Vital Signs (Most Recent):  Temp: 97.7 °F (36.5 °C) (05/23/24 1016)  Pulse: 67 (05/23/24 1016)  Resp: (!) 24 (05/23/24 1016)  BP: (!) 98/50 (05/23/24 1016)  SpO2: 98 % (05/23/24 1016) Vital Signs (24h Range):  Temp:  [97 °F (36.1 °C)-98 °F (36.7 °C)] 97.7 °F (36.5 °C)  Pulse:  [67-86] 67  Resp:  [10-24] 24  SpO2:  [94 %-100 %] 98 %  BP: ()/(48-83) 98/50     Weight: 92.5 kg (204 lb)  Body mass index is 27.67 kg/m².    Intake/Output Summary (Last 24 hours) at 5/23/2024 1057  Last data filed at 5/22/2024 1517  Gross per 24 hour   Intake 50 ml   Output 0 ml   Net 50 ml         Physical Exam  Vitals reviewed.   Constitutional:       General: He is not in acute distress.     Appearance: Normal appearance. He is not ill-appearing.   HENT:      Head: Normocephalic and atraumatic.      Nose: Nose normal. No congestion.      Mouth/Throat:      Pharynx: Oropharynx is clear.   Eyes:      Extraocular Movements: Extraocular movements intact.      Conjunctiva/sclera: Conjunctivae normal.      Pupils: Pupils are equal, round, and reactive to light.   Cardiovascular:      Rate and Rhythm: Normal rate and regular rhythm.      Pulses: Normal pulses.      Heart sounds: Murmur heard.   Pulmonary:      Effort: Pulmonary effort is normal. No respiratory distress.      Comments: Basilar crackles  Abdominal:      General: Bowel sounds are normal. There is no distension.      Palpations: Abdomen is soft.      Tenderness: There is no abdominal tenderness.   Musculoskeletal:         General: No tenderness.      Right lower leg: Edema present.      Left lower leg: Edema present.   Lymphadenopathy:      Cervical: No cervical adenopathy.   Skin:     General:  Skin is warm and dry.   Neurological:      Mental Status: He is alert and oriented to person, place, and time. Mental status is at baseline.   Psychiatric:         Mood and Affect: Mood normal.             Significant Labs: All pertinent labs within the past 24 hours have been reviewed.    Significant Imaging: I have reviewed all pertinent imaging results/findings within the past 24 hours.    Assessment/Plan:      * Ischemic cardiomyopathy    3/29/24 Echo   Normal LV size with severely reduced systolic function.  Estimated LVEF   25%.   There is akinesis of the mid to apical anteroseptal wall and entire apex.   There is a small apical thrombus on contrasted images.   No significant valvular abnormalities.   Moderate biatrial enlargement.     Compared to prior study in July 2021, there has been little change.    Potential apical thrombus mentioned on that study as well.     Left Ventricle   Normal wall thickness observed. Moderate to severe (25-30%) ejection fraction. Left ventricular diastolic dysfunction. There is mid to apical anteroseptal wall and apical akinesis. There is a probable small thrombus present. The thrombus is located in the apex.     Right Ventricle   The right ventricular cavity size is normal. Low normal systolic function.     Left Atrium   Left atrium is moderately dilated.     Right Atrium   Right atrium is moderately dilated.     IVC/SVC   Normal central venous pressure (0-5mm Hg).     Mitral Valve   There is mild valve leaflet calcification. No stenosis. Trace mitral regurgitation.     Tricuspid Valve   Tricsupid valve appears grossly normal. Trace tricuspid regurgitation. Pulmonary hypertension absent. RVSP is 28.00.     Aortic Valve   The valve is tricuspid. There is calcification with reduced excursion of the aortic valve present.No aortic stenosis. No aortic regurgitation.     Pulmonic Valve   Normal valve structure. Trace transvalvular regurgitation. No stenosis.     Ascending Aorta   The  sinus of Valsalva is normal. The sinotubular junction is normal. The ascending aorta is not well visualized.     Pericardium   No pericardial effusion.     >continue GDMT  >Pt w/ elevation in bnp, though lower than previous value  >consult cardiology  >start iv lasix 60 mg bid    GI bleed  Patient has acute blood loss due to hemorrhage, the hemorrhage is due to gastrointestinal bleed, patient does have a propensity for bleeding due to a medication, the medication is elequis.. Will trend hemoglobin/hematocrit Every 6 hours, as well as monitor and correct for any coagulation defects. CBC and vital signs have been reviewed and last CBC was noted-   Lab Results   Component Value Date    WBC 3.82 (L) 05/21/2024    HGB 8.3 (L) 05/21/2024    HCT 26.4 (L) 05/21/2024    MCV 98.9 (H) 05/21/2024     (L) 05/21/2024         Will order a type and screen and consent patient for blood transfusion. Will transfuse if Hgb is <7g/dl (<8g/dl in cases of active ACS) or if patient has rapid bleeding leading to hemodynamic instability.    Left ventricular thrombus  Was recently diagnosed and tapered to elequis 2.5 mg bid per wife  However d/t drop in hb 10>8.3, will hold off to any AC pending GI evaluation, Cardiology on board, will get repeat echo.       Malignant neoplasm of pancreas  S/p whipples procedure  Continue creon w/ meals  Per wife he is in remission, follows w/ Oncologist in [place]      Moderate persistent asthma  Cont inhalers      Mixed hyperlipidemia  statin      Primary hypertension  Chronic, controlled. Latest blood pressure and vitals reviewed-     Temp:  [97.9 °F (36.6 °C)]   Pulse:  [67-96]   Resp:  [12-18]   BP: ()/(45-58)   SpO2:  [96 %] .   Home meds for hypertension were reviewed and noted below.   Hypertension Medications               furosemide (LASIX) 40 MG tablet Take 40 mg by mouth 2 (two) times daily.    sacubitriL-valsartan (ENTRESTO) 24-26 mg per tablet Take 1 tablet by mouth 2 (two) times  daily.    spironolactone (ALDACTONE) 50 MG tablet Take 50 mg by mouth Daily.    sacubitriL-valsartan (ENTRESTO) 24-26 mg per tablet Take 1 tablet by mouth 2 (two) times daily.            While in the hospital, will manage blood pressure as follows; Continue home antihypertensive regimen    Will utilize p.r.n. blood pressure medication only if patient's blood pressure greater than 180/110 and he develops symptoms such as worsening chest pain or shortness of breath.      VTE Risk Mitigation (From admission, onward)           Ordered     IP VTE HIGH RISK PATIENT  Once         05/21/24 1552     Place sequential compression device  Until discontinued         05/21/24 1552                    Discharge Planning   KYLEE: 5/24/2024     Code Status: Full Code   Is the patient medically ready for discharge?:     Reason for patient still in hospital (select all that apply): Treatment  Discharge Plan A: Home with family, Home Health                  Rehmat HARJINDER Flanagan MD  Department of Hospital Medicine   [facility]

## 2024-05-23 NOTE — PLAN OF CARE
[facility]  Discharge Final Note    Primary Care Provider: Raymon Tyler MD    Expected Discharge Date: 5/23/2024    Final Discharge Note (most recent)       Final Note - 05/23/24 1422          Final Note    Assessment Type Final Discharge Note     Anticipated Discharge Disposition Home-Health Care Sv     What phone number can be called within the next 1-3 days to see how you are doing after discharge? [phone]        Post-Acute Status    Post-Acute Authorization Home Health     Home Health Status Set-up Complete/Auth obtained     Coverage Medicare     Patient choice form signed by patient/caregiver List with quality metrics by geographic area provided;List from CMS Compare;List from System Post-Acute Care     Discharge Delays None known at this time                     Important Message from Medicare  Important Message from Medicare regarding Discharge Appeal Rights: Given to patient/caregiver, Signed/date by patient/caregiver, Explained to patient/caregiver     Date IMM was signed: 05/23/24  Time IMM was signed: 1300     Follow-up providers       Raymon Tyler MD   Specialty: Family Medicine, Emergency Medicine   Relationship: PCP - General    [address] [facility] [address]   Phone: [phone]       Next Steps: Follow up in 1 week(s)    Instructions: Please follow up on May 30at 10:45    James Hughes MD   Specialty: Interventional Cardiology, Cardiology   Relationship: Consulting Physician    [address] [place] [id number]   Phone: [phone]       Next Steps: Follow up in 1 week(s)    Instructions: Please follow up on July 10 at 9:45              After-discharge care                Home Medical Care       [facility]   Service: Home Health Services    [address] [facility] [address]   Phone: [phone]                             Pt to dc home with hh through [employer]. SS notified Shira of  pt's discharge and faxed dc info to [facility]. IM updated. No other needs at this time.

## 2024-05-23 NOTE — HOSPITAL COURSE
For a more detailed hospital course see IP notes briefly, patient was admitted with lower extremity swelling and volume overload secondary to congestive heart failure in the setting of ischemic cardiomyopathy and heart failure with reduced ejection fraction.  Patient had also complained of off and on rectal bleeding, none since after admission, therefore patient underwent GI workup with EGD and colonoscopy, since patient is on Eliquis for recent LV thrombus and per GI patient is now safe to resume his anticoagulation.  Patient's case was discussed with Cardiology, who recommended continuing home medications on discharge with close outpatient follow-up.  Of note they were not able to see the patient in person since he was NC scope or EGD, based on their recommendation he would now be transitioned to his home medication and we will be seen in the clinic.  Patient and his wife are not wanting to stay any longer here due to personal reasons and requests discharge today.  Patient is on room air saturating above 95%, denies any shortness of breath or chest pain.  It appears as though patient has had multiple hospitalizations, given each hospitalization last only for a few days for volume overload, and the patient really wants to go home within a day or 2, patient and family are offered home hospice if they really do not want to keep coming back and forth to the hospital.  Patient and family to discuss about this with the primary care provider and cardiologist in clinic.

## 2024-05-23 NOTE — SUBJECTIVE & OBJECTIVE
Interval History:     Pt awaiting c scope, diureising well w/ improvement in swelling, per cardiology echo doesn't reveal any thrombus, hb slightly lower today.     Review of Systems   Constitutional:  Positive for fatigue. Negative for chills and fever.   HENT: Negative.  Negative for congestion and rhinorrhea.    Respiratory:  Negative for apnea, cough, chest tightness and shortness of breath.    Cardiovascular: Negative.  Negative for chest pain and leg swelling.   Gastrointestinal:  Positive for blood in stool. Negative for abdominal pain, diarrhea, nausea and vomiting.   Endocrine: Negative.    Genitourinary: Negative.    Musculoskeletal:  Positive for arthralgias.   Skin: Negative.    Allergic/Immunologic: Negative.    Neurological:  Positive for weakness.   Hematological: Negative.    Psychiatric/Behavioral: Negative.       Objective:     Vital Signs (Most Recent):  Temp: 97.7 °F (36.5 °C) (05/23/24 1016)  Pulse: 67 (05/23/24 1016)  Resp: (!) 24 (05/23/24 1016)  BP: (!) 98/50 (05/23/24 1016)  SpO2: 98 % (05/23/24 1016) Vital Signs (24h Range):  Temp:  [97 °F (36.1 °C)-98 °F (36.7 °C)] 97.7 °F (36.5 °C)  Pulse:  [67-86] 67  Resp:  [10-24] 24  SpO2:  [94 %-100 %] 98 %  BP: ()/(48-83) 98/50     Weight: 92.5 kg (204 lb)  Body mass index is 27.67 kg/m².    Intake/Output Summary (Last 24 hours) at 5/23/2024 1057  Last data filed at 5/22/2024 1517  Gross per 24 hour   Intake 50 ml   Output 0 ml   Net 50 ml         Physical Exam  Vitals reviewed.   Constitutional:       General: He is not in acute distress.     Appearance: Normal appearance. He is not ill-appearing.   HENT:      Head: Normocephalic and atraumatic.      Nose: Nose normal. No congestion.      Mouth/Throat:      Pharynx: Oropharynx is clear.   Eyes:      Extraocular Movements: Extraocular movements intact.      Conjunctiva/sclera: Conjunctivae normal.      Pupils: Pupils are equal, round, and reactive to light.   Cardiovascular:      Rate and  Rhythm: Normal rate and regular rhythm.      Pulses: Normal pulses.      Heart sounds: Murmur heard.   Pulmonary:      Effort: Pulmonary effort is normal. No respiratory distress.      Comments: Basilar crackles  Abdominal:      General: Bowel sounds are normal. There is no distension.      Palpations: Abdomen is soft.      Tenderness: There is no abdominal tenderness.   Musculoskeletal:         General: No tenderness.      Right lower leg: Edema present.      Left lower leg: Edema present.   Lymphadenopathy:      Cervical: No cervical adenopathy.   Skin:     General: Skin is warm and dry.   Neurological:      Mental Status: He is alert and oriented to person, place, and time. Mental status is at baseline.   Psychiatric:         Mood and Affect: Mood normal.             Significant Labs: All pertinent labs within the past 24 hours have been reviewed.    Significant Imaging: I have reviewed all pertinent imaging results/findings within the past 24 hours.

## 2024-05-23 NOTE — PT/OT/SLP PROGRESS
Occupational Therapy   Treatment    Name: Tiny Gutierrez  MRN: [id number]  Admitting Diagnosis:  Ischemic cardiomyopathy       Recommendations:     Discharge Recommendations: Moderate Intensity Therapy  Discharge Equipment Recommendations:  cane, straight, rollator  Barriers to discharge:       Assessment:     Tiny Gutierrez is a 78 y.o. male with a medical diagnosis of Ischemic cardiomyopathy.Performance deficits affecting function are weakness, impaired endurance, impaired self care skills.     Rehab Prognosis:  Good; patient would benefit from acute skilled OT services to address these deficits and reach maximum level of function.       Plan:     Patient to be seen 5 x/week to address the above listed problems via self-care/home management, therapeutic activities, therapeutic exercises  Plan of Care Expires:    Plan of Care Reviewed with: patient, spouse    Subjective     Chief Complaint:   Patient/Family Comments/goals:   Pain/Comfort:  Pain Rating 1: 0/10    Objective:     Communicated with: Tea Cornejo RN prior to session.  Patient found HOB elevated with peripheral IV, telemetry upon OT entry to room.    General Precautions: Standard, fall    Orthopedic Precautions:N/A  Braces: N/A  Respiratory Status: Room air     Occupational Performance:     Bed Mobility:    Supine to sit I     Functional Mobility/Transfers:    Functional Mobility:     Activities of Daily Living:        [facility] 6 Click ADL:      Treatment & Education:  Pt performed hand helper with 3 rubber band resistances 20 reps x 2, yellow  tball ex's 20 reps x 2, rom ex's with 2 lb 15 reps x 2 B shld flex,abd/add,elbow flex/ext     Patient left sitting edge of bed with all lines intact, call button in reach, and wife present    GOALS:   Multidisciplinary Problems       Occupational Therapy Goals          Problem: Occupational Therapy    Goal Priority Disciplines Outcome Interventions   Occupational Therapy Goal     OT, PT/OT Progressing     Description: STG:  Pt will perform grooming with setup  Pt will bathe with Mod I  Pt will perform UE dressing with I'ly  Pt will perform LE dressing with Mod I  Pt will sit EOB x 15 min with no assistance  Pt will transfer bed/chair/bsc with RW and supervision  Pt will perform standing task x 15 min with supervision assistance  Pt will tolerate 30 minutes of tx without fatigue      LT.Restore to max I with self care and mobility.                         Time Tracking:     OT Date of Treatment: 24  OT Start Time:   OT Stop Time:   OT Total Time (min): 18 min    Billable Minutes:Therapeutic Exercise 15    OT/KARTIK: KARTIK          2024

## 2024-05-23 NOTE — DISCHARGE SUMMARY
[facility] Hospital Medicine  Discharge Summary      Patient Name: Tiny Gutierrez  MRN: [id number]  TAI: [id number]  Patient Class: IP- Inpatient  Admission Date: 5/21/2024  Hospital Length of Stay: 2 days  Discharge Date and Time:  05/23/2024 1:34 PM  Attending Physician: Bebeto Flanagan MD   Discharging Provider: Bebeto Flanagan MD  Primary Care Provider: Raymon Tyler MD    Primary Care Team: Networked reference to record PCT     HPI:   78 y.o. male with PMH significant for anemia, asthma, previous CVA, chronic systolic CHF (with EF of 25% and apical thrombus on most recent ECHO) and history of pancreatic cancer s/p Whipple procedure as well as CAD s/p CABG, AICD, who presents to the hospital with worsening lower extremity edema.  Patient is accompanied by his wife, patient and wife state that he has been having increasing lower extremity swelling without any shortness of breath.  Patient has had multiple hospitalizations for the same reason, he endorses complete compliance to his medications.  He also endorses with complete compliance to dietary and fluid restriction.  Of note patient and wife mentioned that he has been having red stools along with clots off and on, he had his last bowel movement yesterday which did not include any bleeding.  However fecal occult done by the ER staff was positive.  Patient will be admitted for lower extremity edema and workup of GI bleeding.    * No surgery found *      Hospital Course:   For a more detailed hospital course see IP notes briefly, patient was admitted with lower extremity swelling and volume overload secondary to congestive heart failure in the setting of ischemic cardiomyopathy and heart failure with reduced ejection fraction.  Patient had also complained of off and on rectal bleeding, none since after admission, therefore patient underwent GI workup with EGD and colonoscopy, since patient is on Eliquis for recent LV thrombus and  per GI patient is now safe to resume his anticoagulation.  Patient's case was discussed with Cardiology, who recommended continuing home medications on discharge with close outpatient follow-up.  Of note they were not able to see the patient in person since he was NC scope or EGD, based on their recommendation he would now be transitioned to his home medication and we will be seen in the clinic.  Patient and his wife are not wanting to stay any longer here due to personal reasons and requests discharge today.  Patient is on room air saturating above 95%, denies any shortness of breath or chest pain.  It appears as though patient has had multiple hospitalizations, given each hospitalization last only for a few days for volume overload, and the patient really wants to go home within a day or 2, patient and family are offered home hospice if they really do not want to keep coming back and forth to the hospital.  Patient and family to discuss about this with the primary care provider and cardiologist in clinic.     Goals of Care Treatment Preferences:  Code Status: Full Code      Consults:   Consults (From admission, onward)          Status Ordering Provider     Inpatient consult to Cardiology  Once        Provider:  Augustus Jiménez DO    Acknowledged KIM, REHMAT U     Inpatient consult to Gastroenterology  Once        Provider:  Kaleb Smith MD    Completed KIM, REHMAT U            No new Assessment & Plan notes have been filed under this hospital service since the last note was generated.  Service: Hospital Medicine    Final Active Diagnoses:    Diagnosis Date Noted POA    PRINCIPAL PROBLEM:  Ischemic cardiomyopathy [I25.5] 10/26/2021 Yes     Chronic    GI bleed [K92.2] 05/21/2024 Unknown    Left ventricular thrombus [I51.3] 05/11/2023 Yes    Malignant neoplasm of pancreas [C25.9] 04/10/2023 Yes     Chronic    Moderate persistent asthma [J45.40] 11/01/2022 Yes     Chronic    Mixed hyperlipidemia [E78.2]  Yes      Chronic    Primary hypertension [I10]  Yes     Chronic      Problems Resolved During this Admission:       Discharged Condition: stable    Disposition: Home or Self Care    Follow Up:   Contact information for follow-up providers       Raymon Tyler MD Follow up in 1 week(s).    Specialties: Family Medicine, Emergency Medicine  Contact information:  [address]  [facility] [place] [address]  [phone]               James Hughes MD Follow up in 1 week(s).    Specialties: Interventional Cardiology, Cardiology  Contact information:  [address] [facility] [address]  [phone]                       Contact information for after-discharge care       Home Medical Care       [facility] .    Service: Home Health Services  Contact information:  [address] [facility] [address]  [phone]                                 Patient Instructions:      Comprehensive metabolic panel   Standing Status: Future Standing Exp. Date: 08/21/25     Diet Cardiac     Notify your health care provider if you experience any of the following:  temperature >100.4     Notify your health care provider if you experience any of the following:  persistent nausea and vomiting or diarrhea     Notify your health care provider if you experience any of the following:  severe uncontrolled pain     Notify your health care provider if you experience any of the following:  difficulty breathing or increased cough     Notify your health care provider if you experience any of the following:  severe persistent headache     Notify your health care provider if you experience any of the following:  worsening rash     Notify your health care provider if you experience any of the following:  persistent dizziness, light-headedness, or visual disturbances     Notify your health care provider if you experience any of the following:  increased confusion or weakness     Activity as  tolerated       Significant Diagnostic Studies: N/A    Pending Diagnostic Studies:       Procedure Component Value Units Date/Time    EXTRA TUBES [[id number]] Collected: 05/22/24 0945    Order Status: Sent Lab Status: In process Updated: 05/22/24 0945    Specimen: Blood, Venous     Narrative:      The following orders were created for panel order EXTRA TUBES.  Procedure                               Abnormality         Status                     ---------                               -----------         ------                     Gold Top Hold[[id number]]                                   In process                   Please view results for these tests on the individual orders.           Medications:  Reconciled Home Medications:      Medication List        START taking these medications      potassium chloride SA 20 MEQ tablet  Commonly known as: K-DUR,KLOR-CON  Take 1 tablet (20 mEq total) by mouth once daily. for 7 days            CHANGE how you take these medications      atorvastatin 40 MG tablet  Commonly known as: LIPITOR  Take 1 tablet (40 mg total) by mouth once daily. For CHOLESTEROL  What changed: when to take this     dapagliflozin propanediol 10 mg tablet  Commonly known as: FARXIGA  Take 1 tablet (10 mg total) by mouth once daily.  What changed: Another medication with the same name was removed. Continue taking this medication, and follow the directions you see here.     ENTRESTO 24-26 mg per tablet  Generic drug: sacubitriL-valsartan  Take 1 tablet by mouth 2 (two) times daily.  What changed: Another medication with the same name was removed. Continue taking this medication, and follow the directions you see here.            CONTINUE taking these medications      * albuterol 2.5 mg /3 mL (0.083 %) nebulizer solution  Commonly known as: PROVENTIL  Take 3 mLs (2.5 mg total) by nebulization as needed for Wheezing or Shortness of Breath.     * albuterol 90 mcg/actuation inhaler  Commonly known as:  PROVENTIL/VENTOLIN HFA  2 puffs as needed     BREO ELLIPTA 200-25 mcg/dose Dsdv diskus inhaler  Generic drug: fluticasone furoate-vilanteroL  1 puff once daily.     CALTRATE 600 + D ORAL  Take 1 capsule by mouth Daily.     CREON 24,000-76,000 -120,000 unit capsule  Generic drug: lipase-protease-amylase 24,000-76,000-120,000 units  Take 1 capsule by mouth 3 (three) times daily.     cyanocobalamin 1,000 mcg/mL injection  Inject 1,000 mcg into the muscle every 30 days.     ELIQUIS 2.5 mg Tab  Generic drug: apixaban  Take 2.5 mg by mouth 2 (two) times daily.     FeroSuL 325 mg (65 mg iron) Tab tablet  Generic drug: ferrous sulfate  Take 1 tablet by mouth 3 (three) times daily.     furosemide 40 MG tablet  Commonly known as: LASIX  Take 1 tablet (40 mg total) by mouth 2 (two) times daily. for 14 days     HYDROcodone-acetaminophen 7.5-325 mg per tablet  Commonly known as: NORCO  Take 1 tablet by mouth every 6 (six) hours as needed for Pain.     LORazepam 0.5 MG tablet  Commonly known as: ATIVAN  Take 1 tablet (0.5 mg total) by mouth every 12 (twelve) hours as needed (for ANXIETY and AGITATION).     magnesium oxide 400 mg (241.3 mg magnesium) tablet  Commonly known as: MAG-OX  Take 1 tablet by mouth every morning.     omeprazole 40 MG capsule  Commonly known as: PRILOSEC  Take 1 capsule (40 mg total) by mouth 2 (two) times daily before meals. For STOMACH     ondansetron 4 MG Tbdl  Commonly known as: ZOFRAN-ODT  Take 1 tablet (4 mg total) by mouth every 8 (eight) hours as needed (NAUSEA).     sertraline 25 MG tablet  Commonly known as: ZOLOFT  Take 1 tablet (25 mg total) by mouth once daily.     spironolactone 50 MG tablet  Commonly known as: ALDACTONE  Take 50 mg by mouth Daily.           * This list has 2 medication(s) that are the same as other medications prescribed for you. Read the directions carefully, and ask your doctor or other care provider to review them with you.                STOP taking these medications       EScitalopram oxalate 10 MG tablet  Commonly known as: LEXAPRO              Indwelling Lines/Drains at time of discharge:   Lines/Drains/Airways       Drain  Duration             Male External Urinary Catheter 05/21/24 2100 Medium 1 day                    Time spent on the discharge of patient: >30 minutes         Rehmat HARJINDER Flanagan MD  Department of Hospital Medicine  [facility]

## 2024-05-24 ENCOUNTER — PATIENT OUTREACH (OUTPATIENT)
Dept: ADMINISTRATIVE | Facility: CLINIC | Age: 79
End: 2024-05-24

## 2024-05-24 NOTE — PROGRESS NOTES
C3 nurse spoke with Tiny Gutierrez wife for a TCC post hospital discharge follow up call. The patient has a scheduled HOSFU appointment with Raymon Tyler MD  on 5/30/24 @ 1384.

## 2024-05-28 DIAGNOSIS — I42.9 CARDIOMYOPATHY, UNSPECIFIED TYPE: ICD-10-CM

## 2024-05-28 RX ORDER — SACUBITRIL AND VALSARTAN 24; 26 MG/1; MG/1
1 TABLET, FILM COATED ORAL 2 TIMES DAILY
Qty: 60 TABLET | Refills: 5 | Status: SHIPPED | OUTPATIENT
Start: 2024-05-28 | End: 2025-05-28

## 2024-05-28 RX ORDER — DAPAGLIFLOZIN 10 MG/1
10 TABLET, FILM COATED ORAL DAILY
Qty: 30 TABLET | Refills: 5 | Status: SHIPPED | OUTPATIENT
Start: 2024-05-28 | End: 2025-05-28

## 2024-05-30 ENCOUNTER — OFFICE VISIT (OUTPATIENT)
Dept: FAMILY MEDICINE | Facility: CLINIC | Age: 79
End: 2024-05-30
Payer: MEDICARE

## 2024-05-30 VITALS
WEIGHT: 201 LBS | DIASTOLIC BLOOD PRESSURE: 61 MMHG | BODY MASS INDEX: 27.22 KG/M2 | HEIGHT: 72 IN | HEART RATE: 68 BPM | RESPIRATION RATE: 16 BRPM | TEMPERATURE: 97 F | SYSTOLIC BLOOD PRESSURE: 106 MMHG | OXYGEN SATURATION: 96 %

## 2024-05-30 DIAGNOSIS — C61 PROSTATE CANCER: Chronic | ICD-10-CM

## 2024-05-30 DIAGNOSIS — I50.22 CHRONIC SYSTOLIC (CONGESTIVE) HEART FAILURE: Chronic | ICD-10-CM

## 2024-05-30 DIAGNOSIS — I25.10 CORONARY ARTERY DISEASE INVOLVING NATIVE CORONARY ARTERY OF NATIVE HEART WITHOUT ANGINA PECTORIS: Chronic | ICD-10-CM

## 2024-05-30 DIAGNOSIS — I25.10 ATHEROSCLEROSIS OF NATIVE CORONARY ARTERY OF NATIVE HEART WITHOUT ANGINA PECTORIS: Chronic | ICD-10-CM

## 2024-05-30 DIAGNOSIS — R60.0 BILATERAL LOWER EXTREMITY EDEMA: Chronic | ICD-10-CM

## 2024-05-30 DIAGNOSIS — N04.9 NEPHROTIC SYNDROME: Primary | Chronic | ICD-10-CM

## 2024-05-30 DIAGNOSIS — R60.1 ANASARCA: Chronic | ICD-10-CM

## 2024-05-30 DIAGNOSIS — I25.5 ISCHEMIC CARDIOMYOPATHY: Chronic | ICD-10-CM

## 2024-05-30 DIAGNOSIS — C25.9 MALIGNANT NEOPLASM OF PANCREAS, UNSPECIFIED LOCATION OF MALIGNANCY: Chronic | ICD-10-CM

## 2024-05-30 DIAGNOSIS — E44.0 MODERATE PROTEIN-CALORIE MALNUTRITION: Chronic | ICD-10-CM

## 2024-05-30 DIAGNOSIS — R53.82 CHRONIC FATIGUE: Chronic | ICD-10-CM

## 2024-05-30 DIAGNOSIS — J41.1 MUCOPURULENT CHRONIC BRONCHITIS: Chronic | ICD-10-CM

## 2024-05-30 PROCEDURE — 99496 TRANSJ CARE MGMT HIGH F2F 7D: CPT | Mod: ,,, | Performed by: FAMILY MEDICINE

## 2024-05-30 NOTE — PROGRESS NOTES
Raymon Tyler MD    [address] [place] [address]     PATIENT NAME: Tiny Gutierrez  : 1945  DATE: 24  MRN: [id number]      Billing Provider: Raymon Tyler MD  Level of Service: TRANSITIONAL CARE MANAGE SERVICE 7 DAY DISCHARGE  Patient PCP Information       Provider PCP Type    Raymon Tyler MD General            Reason for Visit / Chief Complaint: Transitional Care (Discharged from [facility] in [place] for Lower leg edema, CHF. Admitted 24-Discharged 24 Medication was reconciled with D/C summary. )       Update PCP  Update Chief Complaint         History of Present Illness / Problem Focused Workflow     Tiny Gutierrez presents to the clinic with Transitional Care (Discharged from [facility] in [place] for Lower leg edema, CHF. Admitted 24-Discharged 24 Medication was reconciled with D/C summary. )     I have reviewed hospital records, discharge summary, medications, labs and images.   I discussed his care with his wife who is present in the clinic.   Patient has all needed supplies at home, he already has home health.   Medications are reconciled with hospital discharge medications and are believed to be accurate    He was admitted for lower extremity edema, Anasarca, and rectal bleeding. Nothing obvious found on upper and lower GI work scopes.     He had successful diuresis and improved in his 2 days before asking to go home.             HPI    Review of Systems     Review of Systems   Constitutional:  Positive for appetite change and fatigue. Negative for activity change, chills and fever.   HENT:  Negative for nasal congestion, ear pain, hearing loss, postnasal drip and sore throat.    Respiratory:  Positive for shortness of breath. Negative for cough, chest tightness and wheezing.    Cardiovascular:  Positive for leg swelling. Negative for chest pain, palpitations and claudication.    Gastrointestinal:  Positive for nausea. Negative for abdominal pain, change in bowel habit, constipation, diarrhea and vomiting.   Genitourinary:  Negative for dysuria.   Musculoskeletal:  Positive for arthralgias. Negative for back pain, gait problem and myalgias.   Integumentary:  Negative for rash.   Neurological:  Negative for weakness and headaches.   Psychiatric/Behavioral:  Positive for sleep disturbance. Negative for suicidal ideas. The patient is not nervous/anxious.         Medical / Social / Family History     Past Medical History:   Diagnosis Date    Aortic heart murmur     Asthma     Cancer     Deep vein thrombosis     Emphysema/COPD     Hyperlipidemia     Ischemic cardiomyopathy     Mitral regurgitation     Old myocardial infarction 03/2001    Tricuspid regurgitation     Vitamin B 12 deficiency     Vitamin D deficiency        Past Surgical History:   Procedure Laterality Date    CARDIAC CATHETERIZATION      CARDIAC DEFIBRILLATOR PLACEMENT      CHOLECYSTECTOMY      CORONARY ARTERY BYPASS GRAFT      TOTAL KNEE ARTHROPLASTY Bilateral        Social History    reports that he has never smoked. He has never been exposed to tobacco smoke. He has never used smokeless tobacco. He reports that he does not drink alcohol and does not use drugs.    Family History  's family history includes Emphysema in his father.    Medications and Allergies     Medications  Outpatient Medications Marked as Taking for the 5/30/24 encounter (Office Visit) with Raymon Tyler MD   Medication Sig Dispense Refill    albuterol (PROVENTIL) 2.5 mg /3 mL (0.083 %) nebulizer solution Take 3 mLs (2.5 mg total) by nebulization as needed for Wheezing or Shortness of Breath. 300 mL 5    apixaban (ELIQUIS) 2.5 mg Tab Take 2.5 mg by mouth 2 (two) times daily.      BREO ELLIPTA 200-25 mcg/dose DsDv diskus inhaler 1 puff once daily.      calcium carbonate/vitamin D3 (CALTRATE 600 + D ORAL) Take 1 capsule by mouth Daily.      CREON  24,000-76,000 -120,000 unit capsule Take 1 capsule by mouth 3 (three) times daily.      dapagliflozin propanediol (FARXIGA) 10 mg tablet Take 1 tablet (10 mg total) by mouth once daily. 30 tablet 5    FEROSUL 325 mg (65 mg iron) Tab tablet Take 1 tablet by mouth 3 (three) times daily.      furosemide (LASIX) 40 MG tablet Take 1 tablet (40 mg total) by mouth 2 (two) times daily. for 14 days 28 tablet 0    magnesium oxide (MAG-OX) 400 mg (241.3 mg magnesium) tablet Take 1 tablet by mouth every morning.      potassium chloride SA (K-DUR,KLOR-CON) 20 MEQ tablet Take 1 tablet (20 mEq total) by mouth once daily. for 7 days 7 tablet 0    sacubitriL-valsartan (ENTRESTO) 24-26 mg per tablet Take 1 tablet by mouth 2 (two) times daily. 60 tablet 5    sertraline (ZOLOFT) 25 MG tablet Take 1 tablet (25 mg total) by mouth once daily. (Patient taking differently: Take 12.5 mg by mouth once daily.) 90 tablet 1    spironolactone (ALDACTONE) 50 MG tablet Take 50 mg by mouth Daily.         Allergies  Review of patient's allergies indicates:   Allergen Reactions    Hay fever and allergy relief Rash    Pollen extracts Rash       Physical Examination     Vitals:    24 1051   BP: 106/61   Pulse: 68   Resp: 16   Temp: 97.1 °F (36.2 °C)     Physical Exam  Vitals and nursing note reviewed.   Constitutional:       General: He is not in acute distress.     Appearance: Normal appearance. He is not ill-appearing.   Eyes:      Extraocular Movements: Extraocular movements intact.      Pupils: Pupils are equal, round, and reactive to light.   Cardiovascular:      Rate and Rhythm: Normal rate and regular rhythm.      Heart sounds: Normal heart sounds.   Pulmonary:      Effort: Pulmonary effort is normal.      Breath sounds: Normal breath sounds.   Abdominal:      General: Bowel sounds are normal.      Palpations: Abdomen is soft.   Musculoskeletal:         General: Normal range of motion.      Right lower le+ Pitting Edema present.       Left lower le+ Pitting Edema present.   Skin:     Findings: No rash.   Neurological:      General: No focal deficit present.      Mental Status: He is alert and oriented to person, place, and time. Mental status is at baseline.   Psychiatric:         Mood and Affect: Mood normal.         Behavior: Behavior normal.            Assessment and Plan (including Health Maintenance)      Problem List  Smart Sets  Document Outside HM   :    Plan:     Low protein - likely a large part of his lower extremity edema, also has CHF    CHF - started on Entresto, has not felt well since starting it, but also did not feel well before starting it    He is now on Creon and that is expensive     Overall he is still alive, but fairly miserable most of the time.     Hospice is not out of the realm of reasonable, but I do not believe the patient nor spouse is anywhere near ready to commit to comfort care.     He has a follow up with Dr. Yan in a couple weeks, Dr. Yan performed a whipple surgery on him over a year ago for prostate cancer     Health Maintenance Due   Topic Date Due    Shingles Vaccine (1 of 2) Never done    RSV Vaccine (Age 60+ and Pregnant patients) (1 - 1-dose 60+ series) Never done    TETANUS VACCINE  2012    COVID-19 Vaccine (3 - Moderna risk series) 2021       Problem List Items Addressed This Visit          Pulmonary    Mucopurulent chronic bronchitis (Chronic)       Cardiac/Vascular    Ischemic cardiomyopathy (Chronic)    Coronary artery disease involving native heart without angina pectoris (Chronic)    Atherosclerotic heart disease of native coronary artery without angina pectoris (Chronic)    Overview     [facility] 3/20/2015 - Dr. Cardenas  1. Three Vessel CAD with patent graft to the LAD and OM  2. Moderate LV systolic dysfunction, LVEF 35-40%  3. Yuki LV hemodynamics           Chronic systolic (congestive) heart failure (Chronic)       Renal/    Nephrotic syndrome - Primary (Chronic)        Oncology    Prostate cancer (Chronic)    Malignant neoplasm of pancreas (Chronic)    Overview     Whipple procedure performed at [facility] in [place], [place]. By Dr. Juancho Yan. Early 2023             Endocrine    Moderate protein-calorie malnutrition (Chronic)       Other    Fatigue (Chronic)    Bilateral lower extremity edema (Chronic)    Anasarca (Chronic)       Health Maintenance Topics with due status: Not Due       Topic Last Completion Date    Lipid Panel 09/03/2023    Colonoscopy 05/23/2024       Procedures     Future Appointments   Date Time Provider Department Center   7/10/2024  9:45 AM James Hughes MD [facility]   8/30/2024  1:15 PM Raymon Tyler MD [facility]        Follow up in about 3 months (around 8/30/2024), or if symptoms worsen or fail to improve, for chronic health problems.     Signature:  Raymon Tyler MD  [address] [place] [address]  Phone #: [phone]  Fax #: [phone]    Date of encounter: 5/30/24    There are no Patient Instructions on file for this visit.

## 2024-06-03 NOTE — PHYSICIAN QUERY
"Please specify the      Anemia    Diagnosis   Acute on Chronic blood loss anemia       To respond, click "New Note"  Please document in your progress notes daily for the duration of treatment until resolved and include in your discharge summary.    " Medication and Quantity requested: Norco     Last Visit  12-4-17,    Pharmacy and phone number updated in EPIC:  yes

## 2024-06-03 NOTE — PHYSICIAN QUERY
"Provider, specify the    Acuity of the Systolic Heart Failure (HFrEF)   ..  Acute on Chronic Systolic Heart Failure (HFrEF or HFmrEF)        To respond, click "New Note"  Please document in your progress notes daily for the duration of treatment until resolved and include in your discharge summary.    "

## 2024-06-07 ENCOUNTER — EXTERNAL HOME HEALTH (OUTPATIENT)
Dept: HOME HEALTH SERVICES | Facility: HOSPITAL | Age: 79
End: 2024-06-07
Payer: MEDICARE

## 2024-06-14 ENCOUNTER — OFFICE VISIT (OUTPATIENT)
Dept: GASTROENTEROLOGY | Facility: CLINIC | Age: 79
End: 2024-06-14
Payer: MEDICARE

## 2024-06-14 VITALS
OXYGEN SATURATION: 99 % | SYSTOLIC BLOOD PRESSURE: 100 MMHG | WEIGHT: 205.19 LBS | DIASTOLIC BLOOD PRESSURE: 53 MMHG | HEIGHT: 72 IN | HEART RATE: 78 BPM | BODY MASS INDEX: 27.79 KG/M2

## 2024-06-14 DIAGNOSIS — I50.22 CHRONIC SYSTOLIC (CONGESTIVE) HEART FAILURE: Primary | Chronic | ICD-10-CM

## 2024-06-14 DIAGNOSIS — D69.6 THROMBOCYTOPENIA, UNSPECIFIED: Chronic | ICD-10-CM

## 2024-06-14 DIAGNOSIS — K92.2 GASTROINTESTINAL HEMORRHAGE, UNSPECIFIED GASTROINTESTINAL HEMORRHAGE TYPE: ICD-10-CM

## 2024-06-14 DIAGNOSIS — R53.82 CHRONIC FATIGUE: Chronic | ICD-10-CM

## 2024-06-14 DIAGNOSIS — R18.8 CIRRHOSIS OF LIVER WITH ASCITES, UNSPECIFIED HEPATIC CIRRHOSIS TYPE: Chronic | ICD-10-CM

## 2024-06-14 DIAGNOSIS — K74.60 CIRRHOSIS OF LIVER WITH ASCITES, UNSPECIFIED HEPATIC CIRRHOSIS TYPE: Chronic | ICD-10-CM

## 2024-06-14 PROCEDURE — 99215 OFFICE O/P EST HI 40 MIN: CPT | Mod: S$PBB,,,

## 2024-06-14 PROCEDURE — 99215 OFFICE O/P EST HI 40 MIN: CPT | Mod: PBBFAC

## 2024-06-14 PROCEDURE — 99999 PR PBB SHADOW E&M-EST. PATIENT-LVL V: CPT | Mod: PBBFAC,,,

## 2024-06-14 NOTE — PROGRESS NOTES
CC:  Cirrhosis with ascites    HPI 79 y.o. white male presents today with a history of pancreatic cancer, chemotherapy and Whipple procedure.  Patient has had a paracentesis in in March of this year with 4100 mL of fluid removed.  Patient has since completed his chemotherapy and sees a number of specialists at [facility].  He has not seen a hepatologist this point in time.  Patient also has history of congestive heart failure and sees Cardiology here in [place].  Patient had an EGD on 05/22/2024 with a normal appearing EGD no pathology at that time due to bleeding and anemia.  Colonoscopy on 05/23/2024 diverticulosis of of severity in the ascending descending and sigmoid colon.  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  Patient does admit to having some blood clots after he has a bowel movement but that only been 2-3 times since discharge from hospital denies any melena.  Patient does complain of gas pain but if he gets up and walks around this does diminish.  MELD 9; Fib 4 3.30 from previous labs will plan to re-score once all labs from today's visit are complete.    Interval from Dr. Smith's consult 5/21/2024  PLAN:   Acute on Chronic GI Hemorrhage  - hematochezia with clots, Eliquis, h/o cirrhosis?, FOBT +, BUN 23  - VS stable; last reported bleeding was yesterday per wife  - agree with monitoring H&H, pRBC as needed, PPI  - likely a LGI source given history, but given he is on Eliquis and with possible underlying decompensated cirrhosis, I think an EGD is warranted to r/o varices   - NPO p MN for EGD tomorrow; will monitor clinical status/labs to see if colonoscopy warranted as inpatient or if we could arrange for this as outpatient in an expedited fashion   Decompensated (ascites) Cirrhosis  - Etiology: unclear, likely BA  - seems that cirrhosis diagnosis is relatively new for the patient over the last 6 months  from tracking in care everywhere; supported by cirrhotic appearing liver on imaging, ascites with fluid studies c/w portal htn, splenomegaly, thrombocytopenia   - Vaccinations: check HAV/HBV status; recommend vaccination if not immune  - MELD: 11  - Ascites: paracentesis studies reviewed in care everywhere and appear consistent with portal hypertension; negative for SBP; already on diuretics managed by Cardiology      - Varices: NPO p MN for EGD tomorrow   - Encephalopathy: no overt HE today   - HCC Surveilance: would benefit from cross sectional imaging as outpatient with liver mass protocol; check AFP       Medical records reviewed. Additional history supplemented by nursing.     Past Medical History:   Diagnosis Date    Aortic heart murmur     Asthma     Cancer     Deep vein thrombosis     Emphysema/COPD     Hyperlipidemia     Ischemic cardiomyopathy     Mitral regurgitation     Old myocardial infarction 03/2001    Tricuspid regurgitation     Vitamin B 12 deficiency     Vitamin D deficiency          Review of Systems  General ROS: negative for chills, fever or weight loss  Cardiovascular ROS: no chest pain or dyspnea on exertion  Gastrointestinal ROS: no abdominal pain, change in bowel habits, or black/ bloody stools    Physical Examination  There were no vitals taken for this visit.  General appearance: alert, cooperative, no distress  HENT: Normocephalic, atraumatic, neck symmetrical, no nasal discharge   Lungs: no labored breathing, symmetric chest wall expansion bilaterally  Heart: regular rate and rhythm without rub; no displacement of the PMI   Abdomen: soft, non-tender; bowel sounds normoactive; no organomegaly    Labs:  Lab Results   Component Value Date    WBC 3.26 (L) 05/23/2024    HGB 7.9 (L) 05/23/2024    HCT 24.6 (L) 05/23/2024    MCV 96.5 (H) 05/23/2024     (L) 05/23/2024       CMP  Sodium   Date Value Ref Range Status   05/23/2024 142 136 - 145 mmol/L Final   04/19/2024 141 136 - 145 mmol/L  Final     Potassium   Date Value Ref Range Status   05/23/2024 3.1 (L) 3.5 - 5.1 mmol/L Final   04/19/2024 3.4 (L) 3.5 - 5.1 mmol/L Final     Comment:     slight hemolysis     Chloride   Date Value Ref Range Status   05/23/2024 106 98 - 107 mmol/L Final   04/19/2024 106 100 - 109 mmol/L Final     CO2   Date Value Ref Range Status   05/23/2024 30 21 - 32 mmol/L Final     Carbon Dioxide   Date Value Ref Range Status   04/19/2024 27 22 - 33 mmol/L Final     Glucose   Date Value Ref Range Status   05/23/2024 99 74 - 106 mg/dL Final     BUN   Date Value Ref Range Status   05/23/2024 22 (H) 7 - 18 mg/dL Final     Blood Urea Nitrogen   Date Value Ref Range Status   04/19/2024 18 5 - 25 mg/dL Final     Creatinine   Date Value Ref Range Status   05/23/2024 1.07 0.70 - 1.30 mg/dL Final   04/19/2024 0.99 0.73 - 1.18 mg/dL Final     Calcium   Date Value Ref Range Status   05/23/2024 8.3 (L) 8.5 - 10.1 mg/dL Final   04/19/2024 8.8 8.8 - 10.6 mg/dL Final     Total Protein   Date Value Ref Range Status   05/23/2024 5.4 (L) 6.4 - 8.2 g/dL Final     Albumin   Date Value Ref Range Status   05/23/2024 2.3 (L) 3.5 - 5.0 g/dL Final     Bilirubin, Total   Date Value Ref Range Status   05/23/2024 0.5 >0.0 - 1.2 mg/dL Final     Alk Phos   Date Value Ref Range Status   05/23/2024 84 45 - 115 U/L Final     AST   Date Value Ref Range Status   05/23/2024 34 15 - 37 U/L Final     ALT   Date Value Ref Range Status   05/23/2024 16 16 - 61 U/L Final     Anion Gap   Date Value Ref Range Status   05/23/2024 9 7 - 16 mmol/L Final   04/19/2024 8 8 - 16 mmol/L Final     eGFR    Date Value Ref Range Status   04/19/2024 77 mL/min/1.73mSq Final     Comment:     In accordance with NKF-ASN Task Force recommendation, calculation based on the Chronic Kidney Disease Epidemiology Collaboration (CKD-EPI) equation without adjustment for race. eGFR adjusted for gender and age and calculated in ml/min/1.73mSquared. eGFR cannot be calculated if  patient is under 18 years of age.     Reference Range:   >= 60 ml/min/1.73mSquared.     eGFR   Date Value Ref Range Status   10/27/2021 67 >=60 mL/min/1.73m² Final       Imaging:  CT Liver triphasic ordered    Independently reviewed    Assessment: Tiny Gutierrez 78 yo WM here with:  Cirrhosis of the liver with ascites  History of pancreatic cancer with chemo  Status post Whipple procedure  History of heart failure  Thrombocytopenia    Plan:  CT liver triphasic scheduled  Liver labs  We will plan ultrasound of liver and elastography at next visit  We will consult with Dr. Kaleb Smith and Dr. Dong Jarrell for next step for this patient  Follow-up 1 month    45 minutes of total time spent on the encounter, which includes face to face time and non-face to face time preparing to see the patient (eg, review of tests), obtaining and/or reviewing separately obtained history, documenting clinical information in the electronic or other health record, Independently interpreting results (not separately reported) and communicating results to the patient/family/caregiver, or care coordination (not separately reported).           Shahrzad Reynaga, FNP [facility]

## 2024-06-18 ENCOUNTER — TELEPHONE (OUTPATIENT)
Dept: GASTROENTEROLOGY | Facility: CLINIC | Age: 79
End: 2024-06-18
Payer: MEDICARE

## 2024-06-18 NOTE — TELEPHONE ENCOUNTER
Attempted to return call. No answer or voicemail noted.          ----- Message from Deborah Magallanes sent at 6/18/2024  2:22 PM CDT -----  Wife left message to please call her back about the office visit with Shahrzad marina..

## 2024-07-01 ENCOUNTER — HOSPITAL ENCOUNTER (OUTPATIENT)
Dept: RADIOLOGY | Facility: HOSPITAL | Age: 79
Discharge: HOME | End: 2024-07-01
Payer: MEDICARE

## 2024-07-01 DIAGNOSIS — K74.60 CIRRHOSIS OF LIVER WITH ASCITES, UNSPECIFIED HEPATIC CIRRHOSIS TYPE: Chronic | ICD-10-CM

## 2024-07-01 DIAGNOSIS — R18.8 CIRRHOSIS OF LIVER WITH ASCITES, UNSPECIFIED HEPATIC CIRRHOSIS TYPE: Chronic | ICD-10-CM

## 2024-07-01 LAB — CREAT SERPL-MCNC: 1.3 MG/DL (ref 0.6–1.3)

## 2024-07-01 PROCEDURE — 25500020 PHARM REV CODE 255

## 2024-07-01 PROCEDURE — 74170 CT ABD WO CNTRST FLWD CNTRST: CPT | Mod: 26,,, | Performed by: RADIOLOGY

## 2024-07-01 PROCEDURE — 74170 CT ABD WO CNTRST FLWD CNTRST: CPT | Mod: TC

## 2024-07-01 PROCEDURE — 82565 ASSAY OF CREATININE: CPT

## 2024-07-01 RX ADMIN — IOPAMIDOL 100 ML: 755 INJECTION, SOLUTION INTRAVENOUS at 09:07

## 2024-07-02 ENCOUNTER — TELEPHONE (OUTPATIENT)
Dept: FAMILY MEDICINE | Facility: CLINIC | Age: 79
End: 2024-07-02
Payer: MEDICARE

## 2024-07-02 NOTE — TELEPHONE ENCOUNTER
Spoke with pt wife she will  paperwork and have provider who prescribed medication fill out paperwork.

## 2024-07-02 NOTE — TELEPHONE ENCOUNTER
----- Message from Liz Herrera sent at 7/2/2024  4:12 PM CDT -----  Pt is asking to FU on a form that he dropped off last Thursday or Friday. It was supposed to be for one of his meds. Pt's phone [phone]

## 2024-07-05 ENCOUNTER — TELEPHONE (OUTPATIENT)
Dept: FAMILY MEDICINE | Facility: CLINIC | Age: 79
End: 2024-07-05
Payer: MEDICARE

## 2024-07-05 NOTE — TELEPHONE ENCOUNTER
Wife called wanting lab results from a provider we referred pt to. I spoke with pt wife she states pt had a CT scan of his liver and they had not heard any results. She states she has contacted their office and they will be back in the office on Monday. She said she hopes she can get some answers on Monday. I explained to her that Dr. Tyler was out of the office until 7/16. She verbalized understanding.

## 2024-07-09 DIAGNOSIS — Z71.89 COMPLEX CARE COORDINATION: ICD-10-CM

## 2024-07-10 ENCOUNTER — OFFICE VISIT (OUTPATIENT)
Dept: CARDIOLOGY | Facility: CLINIC | Age: 79
End: 2024-07-10
Payer: MEDICARE

## 2024-07-10 VITALS
WEIGHT: 192.81 LBS | HEART RATE: 61 BPM | BODY MASS INDEX: 25.55 KG/M2 | OXYGEN SATURATION: 97 % | SYSTOLIC BLOOD PRESSURE: 100 MMHG | HEIGHT: 73 IN | DIASTOLIC BLOOD PRESSURE: 52 MMHG

## 2024-07-10 DIAGNOSIS — G47.00 INSOMNIA, UNSPECIFIED TYPE: Chronic | ICD-10-CM

## 2024-07-10 DIAGNOSIS — I48.92 ATRIAL FLUTTER, UNSPECIFIED TYPE: Chronic | ICD-10-CM

## 2024-07-10 DIAGNOSIS — I25.5 ISCHEMIC CARDIOMYOPATHY: Primary | Chronic | ICD-10-CM

## 2024-07-10 DIAGNOSIS — I25.10 CORONARY ARTERY DISEASE INVOLVING NATIVE CORONARY ARTERY OF NATIVE HEART WITHOUT ANGINA PECTORIS: Chronic | ICD-10-CM

## 2024-07-10 DIAGNOSIS — I48.92 ATRIAL FLUTTER, UNSPECIFIED TYPE: Primary | ICD-10-CM

## 2024-07-10 DIAGNOSIS — J44.9 CHRONIC OBSTRUCTIVE PULMONARY DISEASE, UNSPECIFIED COPD TYPE: Chronic | ICD-10-CM

## 2024-07-10 DIAGNOSIS — C25.9 MALIGNANT NEOPLASM OF PANCREAS, UNSPECIFIED LOCATION OF MALIGNANCY: Chronic | ICD-10-CM

## 2024-07-10 PROCEDURE — 93005 ELECTROCARDIOGRAM TRACING: CPT | Mod: PBBFAC | Performed by: INTERNAL MEDICINE

## 2024-07-10 PROCEDURE — 93010 ELECTROCARDIOGRAM REPORT: CPT | Mod: S$PBB,,, | Performed by: INTERNAL MEDICINE

## 2024-07-10 PROCEDURE — 99214 OFFICE O/P EST MOD 30 MIN: CPT | Mod: PBBFAC,25 | Performed by: INTERNAL MEDICINE

## 2024-07-10 PROCEDURE — 99999 PR PBB SHADOW E&M-EST. PATIENT-LVL IV: CPT | Mod: PBBFAC,,, | Performed by: INTERNAL MEDICINE

## 2024-07-10 PROCEDURE — 99214 OFFICE O/P EST MOD 30 MIN: CPT | Mod: S$PBB,,, | Performed by: INTERNAL MEDICINE

## 2024-07-11 LAB
OHS QRS DURATION: 98 MS
OHS QTC CALCULATION: 413 MS

## 2024-07-11 RX ORDER — DAPAGLIFLOZIN 10 MG/1
10 TABLET, FILM COATED ORAL DAILY
Qty: 28 TABLET | Refills: 0 | COMMUNITY
Start: 2024-07-11

## 2024-07-16 ENCOUNTER — OFFICE VISIT (OUTPATIENT)
Dept: GASTROENTEROLOGY | Facility: CLINIC | Age: 79
End: 2024-07-16
Payer: MEDICARE

## 2024-07-16 VITALS
HEART RATE: 60 BPM | WEIGHT: 193.63 LBS | SYSTOLIC BLOOD PRESSURE: 103 MMHG | HEIGHT: 73 IN | OXYGEN SATURATION: 96 % | BODY MASS INDEX: 25.66 KG/M2 | DIASTOLIC BLOOD PRESSURE: 59 MMHG

## 2024-07-16 DIAGNOSIS — D69.6 THROMBOCYTOPENIA, UNSPECIFIED: Chronic | ICD-10-CM

## 2024-07-16 DIAGNOSIS — K74.60 CIRRHOSIS OF LIVER WITH ASCITES, UNSPECIFIED HEPATIC CIRRHOSIS TYPE: Primary | ICD-10-CM

## 2024-07-16 DIAGNOSIS — R18.8 CIRRHOSIS OF LIVER WITH ASCITES, UNSPECIFIED HEPATIC CIRRHOSIS TYPE: Primary | ICD-10-CM

## 2024-07-16 DIAGNOSIS — C24.0 CANCER OF COMMON BILE DUCT: Chronic | ICD-10-CM

## 2024-07-16 PROCEDURE — 85610 PROTHROMBIN TIME: CPT

## 2024-07-16 PROCEDURE — 99999 PR PBB SHADOW E&M-EST. PATIENT-LVL IV: CPT | Mod: PBBFAC,,,

## 2024-07-16 PROCEDURE — 99214 OFFICE O/P EST MOD 30 MIN: CPT | Mod: S$PBB,,,

## 2024-07-16 PROCEDURE — 99214 OFFICE O/P EST MOD 30 MIN: CPT | Mod: PBBFAC

## 2024-07-16 RX ORDER — METHOCARBAMOL 500 MG/1
500 TABLET, FILM COATED ORAL EVERY 6 HOURS
COMMUNITY
Start: 2024-07-11

## 2024-07-16 RX ORDER — METHYLPREDNISOLONE 4 MG/1
4 TABLET ORAL DAILY
COMMUNITY
Start: 2024-07-11 | End: 2024-07-18

## 2024-07-16 NOTE — PATIENT INSTRUCTIONS
-Limit salt to 2000 mg per day  -Avoid alcohol  -Limit tylenol to 2000 mg per day  -Avoid raw seafood

## 2024-07-16 NOTE — PROGRESS NOTES
CC: cirrhosis    HPI 79 y.o. white male presents today for follow-up 1 month for cirrhosis of the liver with ascites.  Patient looks well today, wife and patient state the swelling in his lower extremities has definitely decreased.  Patient is no longer taking his Lasix 40 mg b.i.d. at this time he is however taking spironolactone 50 mg daily.  Patient does state he has been feeling better, still lacks little energy.  He has been placed on Creon in his only taking it once daily.  States the diarrhea has gotten better since taking the Creon.  At this point patient is compensated cirrhotic in his MELD score with lab work today is a 7.  Patient has next appointment with his oncologist in September and will have a CT at that time and then he will follow-up here in October.  Instructed patient and wife if he begins to have more swelling in his abdomen, swelling in his lower extremities, shortness of breath he will need to get back to the emergency room.  Instructed patient if he notices bruising all of sudden or the whites of his eyes and skin become yellow hue he will need to get back to the emergency room.  Patient and wife verbalized understanding  Labs reviewed no elevated liver enzymes hemoglobin 9.8 and hematocrit 31.1 platelet 133.    MELD 3.0: 7 at 7/16/2024 10:59 AM  MELD-Na: 7 at 7/16/2024 10:59 AM  Calculated from:  Serum Creatinine: 0.98 mg/dL (Using min of 1 mg/dL) at 7/16/2024 10:59 AM  Serum Sodium: 142 mmol/L (Using max of 137 mmol/L) at 7/16/2024 10:59 AM  Total Bilirubin: 0.5 mg/dL (Using min of 1 mg/dL) at 7/16/2024 10:59 AM  Serum Albumin: 3.1 g/dL at 7/16/2024 10:59 AM  INR(ratio): 1.08 at 7/16/2024 10:59 AM  Age at listing (hypothetical): 79 years  Sex: Male at 7/16/2024 10:59 AM     Interval   HPI 79 y.o. white male presents today with a history of pancreatic cancer, chemotherapy and Whipple procedure.  Patient has had a paracentesis in in March of this year with 4100 mL of fluid removed.   "Patient has since completed his chemotherapy and sees a number of specialists at [facility].  He has not seen a hepatologist this point in time.  Patient also has history of congestive heart failure and sees Cardiology here in [place].  Patient had an EGD on 05/22/2024 with a normal appearing EGD no pathology at that time due to bleeding and anemia.  Colonoscopy on 05/23/2024 diverticulosis of of severity in the ascending descending and sigmoid colon.  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  Patient does admit to having some blood clots after he has a bowel movement but that only been 2-3 times since discharge from hospital denies any melena.  Patient does complain of gas pain but if he gets up and walks around this does diminish.  MELD 9; Fib 4 3.30 from previous labs will plan to re-score once all labs from today's visit are complete.    Medical records reviewed. Additional history supplemented by nursing.     Past Medical History:   Diagnosis Date    Aortic heart murmur     Asthma     Cancer     Deep vein thrombosis     Emphysema/COPD     Hyperlipidemia     Ischemic cardiomyopathy     Mitral regurgitation     Old myocardial infarction 03/2001    Tricuspid regurgitation     Vitamin B 12 deficiency     Vitamin D deficiency          Review of Systems  General ROS: negative for chills, fever or weight loss  Cardiovascular ROS: no chest pain or dyspnea on exertion  Gastrointestinal ROS: no abdominal pain, change in bowel habits, or black/ bloody stools    Physical Examination  BP (!) 103/59   Pulse 60   Ht 6' 1" (1.854 m)   Wt 87.8 kg (193 lb 9.6 oz)   SpO2 96%   BMI 25.54 kg/m²   General appearance: alert, cooperative, no distress  HENT: Normocephalic, atraumatic, neck symmetrical, no nasal discharge   Lungs: no labored breathing, symmetric chest wall expansion bilaterally  Heart: regular rate and rhythm without rub; " no displacement of the PMI   Abdomen: soft, non-tender; bowel sounds normoactive; no organomegaly    Labs:  Lab Results   Component Value Date    WBC 5.14 06/14/2024    HGB 9.8 (L) 06/14/2024    HCT 31.1 (L) 06/14/2024    MCV 97.8 (H) 06/14/2024     (L) 06/14/2024       CMP  Sodium   Date Value Ref Range Status   06/14/2024 143 136 - 145 mmol/L Final   04/19/2024 141 136 - 145 mmol/L Final     Potassium   Date Value Ref Range Status   06/14/2024 3.9 3.5 - 5.1 mmol/L Final   04/19/2024 3.4 (L) 3.5 - 5.1 mmol/L Final     Comment:     slight hemolysis     Chloride   Date Value Ref Range Status   06/14/2024 112 (H) 98 - 107 mmol/L Final   04/19/2024 106 100 - 109 mmol/L Final     CO2   Date Value Ref Range Status   06/14/2024 26 21 - 32 mmol/L Final     Carbon Dioxide   Date Value Ref Range Status   04/19/2024 27 22 - 33 mmol/L Final     Glucose   Date Value Ref Range Status   06/14/2024 126 (H) 74 - 106 mg/dL Final     BUN   Date Value Ref Range Status   06/14/2024 20 (H) 7 - 18 mg/dL Final     Blood Urea Nitrogen   Date Value Ref Range Status   04/19/2024 18 5 - 25 mg/dL Final     Creatinine   Date Value Ref Range Status   06/14/2024 1.09 0.70 - 1.30 mg/dL Final   04/19/2024 0.99 0.73 - 1.18 mg/dL Final     Calcium   Date Value Ref Range Status   06/14/2024 8.5 8.5 - 10.1 mg/dL Final   04/19/2024 8.8 8.8 - 10.6 mg/dL Final     Total Protein   Date Value Ref Range Status   06/14/2024 6.4 6.4 - 8.2 g/dL Final   06/14/2024 6.4 6.4 - 8.2 g/dL Final     Albumin   Date Value Ref Range Status   06/14/2024 2.7 (L) 3.5 - 5.0 g/dL Final     Bilirubin, Total   Date Value Ref Range Status   06/14/2024 0.4 >0.0 - 1.2 mg/dL Final     Alk Phos   Date Value Ref Range Status   06/14/2024 121 (H) 45 - 115 U/L Final     AST   Date Value Ref Range Status   06/14/2024 27 15 - 37 U/L Final     ALT   Date Value Ref Range Status   06/14/2024 21 16 - 61 U/L Final     Anion Gap   Date Value Ref Range Status   06/14/2024 9 7 - 16 mmol/L  Final   04/19/2024 8 8 - 16 mmol/L Final     eGFR    Date Value Ref Range Status   04/19/2024 77 mL/min/1.73mSq Final     Comment:     In accordance with NKF-ASN Task Force recommendation, calculation based on the Chronic Kidney Disease Epidemiology Collaboration (CKD-EPI) equation without adjustment for race. eGFR adjusted for gender and age and calculated in ml/min/1.73mSquared. eGFR cannot be calculated if patient is under 18 years of age.     Reference Range:   >= 60 ml/min/1.73mSquared.     eGFR   Date Value Ref Range Status   10/27/2021 67 >=60 mL/min/1.73m² Final       Imaging:  CT ABDOMEN W/O CONTRAST PLUS TRIPHASIC W/CONTRAST     CLINICAL HISTORY:  Unspecified cirrhosis of livercirrhosis;     COMPARISON:  None     TECHNIQUE:  Multiple axial tomographic images of the abdomen and pelvis were obtained before and after administration of 100 cc Isovue 370 intravenous contrast.     FINDINGS:  Small left and trace right pleural effusions.     Question subtle nodular contour of the liver.  Cirrhosis not excluded.  No worrisome focal hepatic lesion.  There is trace abdominal ascites.  No abnormal pancreatic ductal dilatation.  Granulomatous calcifications of the spleen.     Bilateral adrenal glands grossly unremarkable.  Bilateral kidneys appear grossly unremarkable.     No evidence of gastrointestinal obstruction.  Prior gastric bypass procedure.  Partially visualized pacemaker leads.  Scattered skeletal degenerative change.  Atherosclerotic calcification demonstrated.     Impression:     Question subtle nodular contour of the liver. Cirrhosis not excluded. There is trace abdominal ascites. Small left and trace right pleural effusions.  Prior gastric bypass procedure.  Other/detailed findings as above.     The CT exam was performed using one or more of the following dose     reduction techniques- Automated exposure control, adjustment of the mA     and/or kV according to patient size, and/or use of  iterative     reconstructed technique.     Point of Service: [facility]        Electronically signed by:Srikanth Barragan  Date:                                            07/01/2024    Independently reviewed    Assessment: Tiny Gutierrez 80 yo WM here with:  Cirrhosis  History of pancreatic cancer  Status post Whipple  Thrombocytopenia    Plan:  Lab today  -- Strict abstinence from alcohol  -- Avoid non-steroidal anti-inflammatory drugs (NSAIDs) such as ibuprofen (Motrin, Advil), naproxen (Naprosyn, Aleve)  -- Tylenol/acetaminophen as needed for pain, no more than 2000 mg per day  -- No raw shellfish due to the risk of Vibrio vulnificus infection  -- Low sodium diet, less than 2000 mg per day   -- High protein diet to prevent muscle mass loss. Can drink protein shakes  -- Upper endoscopy every 1-3 years to screen for varices   -- Ultrasound of the liver every 6 months for HCC screening   Follow-up in 3 months    30 minutes of total time spent on the encounter, which includes face to face time and non-face to face time preparing to see the patient (eg, review of tests), obtaining and/or reviewing separately obtained history, documenting clinical information in the electronic or other health record, Independently interpreting results (not separately reported) and communicating results to the patient/family/caregiver, or care coordination (not separately reported).         Carla Adams, FNP [facility]

## 2024-07-18 ENCOUNTER — DOCUMENT SCAN (OUTPATIENT)
Dept: HOME HEALTH SERVICES | Facility: HOSPITAL | Age: 79
End: 2024-07-18
Payer: MEDICARE

## 2024-07-25 ENCOUNTER — DOCUMENT SCAN (OUTPATIENT)
Dept: HOME HEALTH SERVICES | Facility: HOSPITAL | Age: 79
End: 2024-07-25
Payer: MEDICARE

## 2024-07-29 ENCOUNTER — HOSPITAL ENCOUNTER (OUTPATIENT)
Dept: CARDIOLOGY | Facility: HOSPITAL | Age: 79
Discharge: HOME | End: 2024-07-29
Attending: INTERNAL MEDICINE
Payer: MEDICARE

## 2024-07-29 DIAGNOSIS — Z95.810 PRESENCE OF DOUBLE CHAMBER AUTOMATIC CARDIOVERTER/DEFIBRILLATOR (AICD): ICD-10-CM

## 2024-07-29 DIAGNOSIS — Z95.810 PRESENCE OF DOUBLE CHAMBER AUTOMATIC CARDIOVERTER/DEFIBRILLATOR (AICD): Primary | ICD-10-CM

## 2024-07-29 PROCEDURE — 93283 PRGRMG EVAL IMPLANTABLE DFB: CPT | Mod: 26,,, | Performed by: INTERNAL MEDICINE

## 2024-07-29 PROCEDURE — 93283 PRGRMG EVAL IMPLANTABLE DFB: CPT

## 2024-07-29 RX ORDER — DAPAGLIFLOZIN 10 MG/1
10 TABLET, FILM COATED ORAL DAILY
Qty: 28 TABLET | Refills: 0 | COMMUNITY
Start: 2024-07-29

## 2024-07-29 RX ORDER — SACUBITRIL AND VALSARTAN 24; 26 MG/1; MG/1
1 TABLET, FILM COATED ORAL 2 TIMES DAILY
Qty: 112 TABLET | Refills: 0 | COMMUNITY
Start: 2024-07-29

## 2024-07-29 RX ORDER — METHYLPREDNISOLONE 4 MG/1
TABLET ORAL
Qty: 21 EACH | Refills: 0 | Status: SHIPPED | OUTPATIENT
Start: 2024-07-29 | End: 2024-08-19

## 2024-07-31 LAB
BATTERY VOLTAGE (V): 2.98 V
ERI (V): 2.73 V
OHS CV DC PP MS1: 0.4 MS
OHS CV DC PP MS2: 0.4 MS
OHS CV DC PP V1: 1.5 V
OHS CV DC PP V2: 2 V

## 2024-08-08 ENCOUNTER — DOCUMENT SCAN (OUTPATIENT)
Dept: HOME HEALTH SERVICES | Facility: HOSPITAL | Age: 79
End: 2024-08-08
Payer: MEDICARE

## 2024-08-08 DIAGNOSIS — F33.9 DEPRESSION, RECURRENT: Primary | Chronic | ICD-10-CM

## 2024-08-08 RX ORDER — SERTRALINE HYDROCHLORIDE 50 MG/1
50 TABLET, FILM COATED ORAL DAILY
Qty: 90 TABLET | Refills: 1 | Status: SHIPPED | OUTPATIENT
Start: 2024-08-08

## 2024-08-12 ENCOUNTER — EXTERNAL HOME HEALTH (OUTPATIENT)
Dept: HOME HEALTH SERVICES | Facility: HOSPITAL | Age: 79
End: 2024-08-12
Payer: MEDICARE

## 2024-08-15 PROBLEM — G47.00 INSOMNIA: Chronic | Status: ACTIVE | Noted: 2024-08-15

## 2024-08-15 PROBLEM — J44.9 CHRONIC OBSTRUCTIVE PULMONARY DISEASE: Chronic | Status: ACTIVE | Noted: 2024-08-15

## 2024-08-15 RX ORDER — SPIRONOLACTONE 50 MG/1
50 TABLET, FILM COATED ORAL DAILY
Qty: 90 TABLET | Refills: 3 | Status: SHIPPED | OUTPATIENT
Start: 2024-08-15 | End: 2025-08-15

## 2024-08-15 NOTE — PROGRESS NOTES
PCP: Raymon Tyler MD        Subjective:   Tiny Gutierrez is a 78 y.o. male with hx of CAD s/p CABG (2007), iCMP s/p ICD, HTN, HLD, CKD, COPD, prostate cancer, pancreatic cancer s/p whipple surgery, LV thrombus ( 7/18/2021 echo, [facility] [place] [place]) and atrial flutter,  who presents for 3 week follow up.      [facility]Tiny Gutierrez is a 78 y.o. male with hx of CAD s/p CABG (2007), iCMP s/p ICD, HTN, HLD, CKD, COPD, prostate cancer, pancreatic cancer, LV thrombus ( 7/18/2021 echo, [facility] [place] [place]) and atrial flutter,  who presents for HD follow up from [facility] on  2 times in the last 2 months. His mos recent admission was 4/18/2024 for diuresis after gaining 30 lbs in a week. He had been diuresed 50 lb s in a week prior to dc initially.  He also had a paracentesis on the first admission but this was not required  upon readmission. He was diuresed and discharged home.   The patient states that overall he feels some better. He continues to have some SOB at times and lower extremity edema that resolves at HS. He reports that his daily weights have been stable.       This is a TCC visit. [facility] from [facility] and labs from [facility] were reviewed as well as meds were reviewed and reconciled.    1/26/2024 (Dr. Hughes) presents for  6 month follow up.       9/23/23--Tiny Gutierrez is a 78 y.o. male with hx of CAD s/p CABG (2007), iCMP s/p ICD, HTN, HLD, CKD, COPD, prostate cancer, pancreatic cancer, LV thrombus ( 7/18/2021 echo, [facility] [place] [place]) and atrial flutter,  who presents for follow up after last hospitalization.      8/2/23--Tiny Gutierrez is a 79 y.o. male with hx of CAD s/p CABG (2007), iCMP s/p ICD, HTN, HLD, asthma, CKD, COPD, prostate cancer, pancreatic cancer, LV thrombus ( [facility]20[facility] [place] [place] and atrial flutter,  who presents for return check requested for severe BLE edema. He denies chest pain , orthopna or PND but has chronic, stable CONRAD. The lower ext  "edema began approx 3 weeks ago after starting a "cancer med". This med has been stopped by his oncologist and lasix dose increased to 40 mg with the addition of aldactone 50 mg po daily with improvement but not resolution of the edema. His serum albumin is also 2.1 which is a contributing factor. An echocardiogram was done earlier today and results are pending.         Fhx:  Family History   Problem Relation Name Age of Onset    Emphysema Father       Shx:   Social History     Socioeconomic History    Marital status:    Tobacco Use    Smoking status: Never     Passive exposure: Never    Smokeless tobacco: Never   Substance and Sexual Activity    Alcohol use: Never    Drug use: Never    Sexual activity: Yes     Partners: Female     Social Determinants of Health     Financial Resource Strain: Low Risk  (4/18/2024)    Received from [facility] and Its Subsidiaries and Affiliates    Overall Financial Resource Strain (CARDIA)     Difficulty of Paying Living Expenses: Not hard at all   Food Insecurity: No Food Insecurity (4/18/2024)    Received from [facility] and Its Subsidiaries and Affiliates    Hunger Vital Sign     Worried About Running Out of Food in the Last Year: Never true     Ran Out of Food in the Last Year: Never true   Transportation Needs: No Transportation Needs (4/18/2024)    Received from [facility] [facility] and Its Subsidiaries and Affiliates    PRAPARE - Transportation     Lack of Transportation (Medical): No     Lack of Transportation (Non-Medical): No   Housing Stability: Not At Risk (3/22/2024)    Received from [facility]    BOC Housing Stability Source     Housing Insecurity: 1       EKG   Results for orders placed or performed in visit on 07/10/24   EKG 12-lead    Collection Time: 07/10/24 10:06 AM   Result Value Ref Range    QRS Duration 98 ms    OHS QTC " Calculation 413 ms    Narrative    Test Reason : I48.92,    Vent. Rate : 066 BPM     Atrial Rate : 066 BPM     P-R Int : 192 ms          QRS Dur : 098 ms      QT Int : 394 ms       P-R-T Axes : 074 119 -17 degrees     QTc Int : 413 ms    Atrial-paced rhythm with occasional sinus complexes and with occasional   Premature ventricular complexes  Incomplete right bundle branch block  Right ventricular hypertrophy with repolarization abnormality  Septal infarct ,age undetermined  T wave abnormality, consider inferior ischemia  Abnormal ECG  When compared with ECG of 21-MAY-2024 11:06,  PREVIOUS ECG IS PRESENT  Confirmed by James Hughes MD ([id number]) on 7/11/2024 8:41:13 AM    Referred By:             Confirmed By:James Hughes MD      9/26/23--NSR, rightward axis, 85 bpm     Echo     5/21/24--Interpretation Summary    Left Ventricle: The left ventricle is normal in size. Increased wall thickness. Severe global hypokinesis present. There is severely reduced systolic function with a visually estimated ejection fraction of 20 - 25%. Ejection fraction by visual approximation is 25%. Grade I diastolic dysfunction.    Right Ventricle: Normal right ventricular cavity size. Wall thickness is normal. Systolic function is borderline low.    Left Atrium: Left atrium is dilated.    Right Atrium: Right atrium is dilated.    Aortic Valve: The aortic valve is a trileaflet valve. There is severe aortic valve sclerosis. Mildly restricted motion. There is mild to moderate stenosis. Aortic valve area by VTI is 1.75 cm². Aortic valve peak velocity is 1.64 m/s. Mean gradient is 6 mmHg. The dimensionless index is 0.64. There is mild aortic regurgitation.    Tricuspid Valve: There is moderate to severe regurgitation.    Pulmonic Valve: There is mild regurgitation.    IVC/SVC: Normal venous pressure at 3 mmHg.        3/29/2024 at  D.  Technically difficult study with limited views.  Contrasted images   utilized.   Normal LV size with  severely reduced systolic function.  Estimated LVEF   25%.   There is akinesis of the mid to apical anteroseptal wall and entire apex.   There is a small apical thrombus on contrasted images.   No significant valvular abnormalities.   Moderate biatrial enlargement.  Echo    8/30/23--EF 40%.  Focal apical akinesis.  LV mildly dilated. Left atrium moderately dilated.  Mild AS.  Mild MR.     8/2/23--Interpretation Summary    Left Ventricle: regional wall motion abnormalities present. There is mildly reduced systolic function. Ejection fraction by visual approximation is 40%. There is diastolic dysfunction.    Left Atrium: Left atrium is mildly dilated.    Right Atrium: Right atrium is mildly dilated. Catheter present in the right atrium.    Aortic Valve: There is moderate aortic valve sclerosis.    Mitral Valve: There is mild to moderate regurgitation.    Tricuspid Valve: There is mild transvalvular regurgitation.      2/17/23--Interpretation Summary  · Mild right ventricular enlargement with normal right ventricular systolic function.  · Moderate mitral regurgitation.  · Mild to moderate tricuspid regurgitation.  · Normal central venous pressure (3 mmHg).  · The estimated PA systolic pressure is 28 mmHg.  · Left ventricular diastolic dysfunction.  · Mildly decreased systolic function.  · The estimated ejection fraction is 40%.  · There are segmental left ventricular wall motion abnormalities.  · Mild left atrial enlargement.    [facility] 3/20/15--3 V CAD with patent grafts to the LAD and obtuse marginal.  Moderate LV systolic dysfunction, LVEF 35-40%.  Normal LV hemodynamics.      3/28/07--3 V CAD.  Segmental wall motion abnormality with severely reduced LVSF, EF 20%.      CABG:     3/29/07--LIMA to LAD, SVG to RCA and OM.     ICD:     10/3/19--R/R end of life ICD with new MRI compatible ICD.      8/8/13--R/ R end of life ICD with new dual chamber ICD.     1/15/09--implantation of dual chamber PPM ICD.  .        Lab Results    Component Value Date     07/16/2024    K 4.2 07/16/2024     (H) 07/16/2024    CO2 31 07/16/2024    BUN 21 (H) 07/16/2024    CREATININE 0.98 07/16/2024    CALCIUM 9.0 07/16/2024    ANIONGAP 6 (L) 07/16/2024    ESTGFRAFRICA 77 04/19/2024    EGFRNONAA 67 10/27/2021       Lab Results   Component Value Date    CHOL 185 10/27/2021    CHOL 119 08/18/2020     Lab Results   Component Value Date    HDL 54 10/27/2021    HDL 44 08/18/2020     Lab Results   Component Value Date    LDLCALC 112 10/27/2021    LDLCALC 57 08/18/2020     Lab Results   Component Value Date    TRIG 97 10/27/2021    TRIG 90 08/18/2020     Lab Results   Component Value Date    CHOLHDL 3.4 10/27/2021    CHOLHDL 2.7 08/18/2020       Lab Results   Component Value Date    WBC 5.49 07/16/2024    HGB 10.7 (L) 07/16/2024    HCT 33.9 (L) 07/16/2024    MCV 98.8 (H) 07/16/2024     (L) 07/16/2024           Current Outpatient Medications:     albuterol (PROVENTIL) 2.5 mg /3 mL (0.083 %) nebulizer solution, Take 3 mLs (2.5 mg total) by nebulization as needed for Wheezing or Shortness of Breath., Disp: 300 mL, Rfl: 5    albuterol (PROVENTIL/VENTOLIN HFA) 90 mcg/actuation inhaler, 2 puffs as needed, Disp: , Rfl:     apixaban (ELIQUIS) 2.5 mg Tab, Take 2.5 mg by mouth 2 (two) times daily., Disp: , Rfl:     atorvastatin (LIPITOR) 40 MG tablet, Take 1 tablet (40 mg total) by mouth once daily. For CHOLESTEROL (Patient taking differently: Take 40 mg by mouth every evening. For CHOLESTEROL), Disp: 90 tablet, Rfl: 1    BREO ELLIPTA 200-25 mcg/dose DsDv diskus inhaler, 1 puff once daily., Disp: , Rfl:     calcium carbonate/vitamin D3 (CALTRATE 600 + D ORAL), Take 1 capsule by mouth Daily., Disp: , Rfl:     CREON 24,000-76,000 -120,000 unit capsule, Take 1 capsule by mouth 3 (three) times daily., Disp: , Rfl:     dapagliflozin propanediol (FARXIGA) 10 mg tablet, Take 1 tablet (10 mg total) by mouth once daily., Disp: 30 tablet, Rfl: 5    sacubitriL-valsartan  "(ENTRESTO) 24-26 mg per tablet, Take 1 tablet by mouth 2 (two) times daily., Disp: 60 tablet, Rfl: 5    dapagliflozin propanediol (FARXIGA) 10 mg tablet, Take 1 tablet (10 mg total) by mouth once daily., Disp: 28 tablet, Rfl: 0    dapagliflozin propanediol (FARXIGA) 10 mg tablet, Take 1 tablet (10 mg total) by mouth once daily., Disp: 28 tablet, Rfl: 0    FEROSUL 325 mg (65 mg iron) Tab tablet, Take 1 tablet by mouth 3 (three) times daily., Disp: , Rfl:     furosemide (LASIX) 40 MG tablet, Take 1 tablet (40 mg total) by mouth 2 (two) times daily. for 14 days (Patient not taking: Reported on 7/10/2024), Disp: 28 tablet, Rfl: 0    LORazepam (ATIVAN) 0.5 MG tablet, Take 1 tablet (0.5 mg total) by mouth every 12 (twelve) hours as needed (for ANXIETY and AGITATION)., Disp: 30 tablet, Rfl: 1    magnesium oxide (MAG-OX) 400 mg (241.3 mg magnesium) tablet, Take 1 tablet by mouth every morning., Disp: , Rfl:     methocarbamoL (ROBAXIN) 500 MG Tab, Take 500 mg by mouth every 6 (six) hours. prn, Disp: , Rfl:     methylPREDNISolone (MEDROL DOSEPACK) 4 mg tablet, use as directed, Disp: 21 each, Rfl: 0    sacubitriL-valsartan (ENTRESTO) 24-26 mg per tablet, Take 1 tablet by mouth 2 (two) times daily., Disp: 112 tablet, Rfl: 0    sertraline (ZOLOFT) 50 MG tablet, Take 1 tablet (50 mg total) by mouth once daily., Disp: 90 tablet, Rfl: 1    spironolactone (ALDACTONE) 50 MG tablet, Take 1 tablet (50 mg total) by mouth Daily., Disp: 90 tablet, Rfl: 3  Medications reviewed and reconciled.     Review of Systems   Respiratory:  Positive for shortness of breath.    Cardiovascular:  Negative for chest pain, palpitations and leg swelling.   Neurological:  Positive for dizziness. Negative for loss of consciousness.       Objective:   BP (!) 100/52 (BP Location: Left arm, Patient Position: Sitting)   Pulse 61   Ht 6' 1" (1.854 m)   Wt 87.5 kg (192 lb 12.8 oz)   SpO2 97%   BMI 25.44 kg/m²     Physical Exam  Vitals reviewed. "   Constitutional:       General: He is not in acute distress.     Appearance: Normal appearance.   HENT:      Head: Normocephalic and atraumatic.   Neck:      Vascular: No carotid bruit or JVD.   Cardiovascular:      Rate and Rhythm: Normal rate and regular rhythm.      Pulses: Normal pulses.           Radial pulses are 2+ on the right side and 2+ on the left side.      Heart sounds: Murmur heard.      Comments: II/VI HSM apex    + edema to above ankles bilaterally    Pulmonary:      Effort: Pulmonary effort is normal.      Breath sounds: Normal breath sounds.   Chest:      Comments: Well healed ICD site to left upper chest wall  Musculoskeletal:      Right lower leg: Edema present.      Left lower leg: Edema present.   Skin:     General: Skin is warm and dry.   Neurological:      General: No focal deficit present.      Mental Status: He is alert.           Assessment:           1. Ischemic cardiomyopathy      s/p ICD      2. Coronary artery disease involving native coronary artery of native heart without angina pectoris        3. Insomnia, unspecified type        4. Atrial flutter, unspecified type  EKG 12-lead    EKG 12-lead      5. Chronic obstructive pulmonary disease, unspecified COPD type        6. Malignant neoplasm of pancreas, unspecified location of malignancy      s/p whipple        PLAN:  Restoril 7.5 mg one po qhs prn -written rx given for #30 with one refill  Samples of farxiga  Use lasix prn  F/u in 3 months.

## 2024-08-21 NOTE — ED TRIAGE NOTES
Pt presents to ED with c/o feeling sick with nausea and vomiting starting yesterday. EKG done at Dr. Tyler's office shows A flutter   senior care Trauma

## 2024-08-26 PROBLEM — K92.2 GI BLEED: Status: RESOLVED | Noted: 2024-05-21 | Resolved: 2024-08-26

## 2024-08-29 ENCOUNTER — DOCUMENT SCAN (OUTPATIENT)
Dept: HOME HEALTH SERVICES | Facility: HOSPITAL | Age: 79
End: 2024-08-29
Payer: MEDICARE

## 2024-08-30 ENCOUNTER — OFFICE VISIT (OUTPATIENT)
Dept: FAMILY MEDICINE | Facility: CLINIC | Age: 79
End: 2024-08-30
Payer: MEDICARE

## 2024-08-30 VITALS
OXYGEN SATURATION: 95 % | HEIGHT: 73 IN | DIASTOLIC BLOOD PRESSURE: 59 MMHG | RESPIRATION RATE: 17 BRPM | WEIGHT: 190 LBS | BODY MASS INDEX: 25.18 KG/M2 | TEMPERATURE: 99 F | SYSTOLIC BLOOD PRESSURE: 94 MMHG | HEART RATE: 73 BPM

## 2024-08-30 DIAGNOSIS — F33.9 DEPRESSION, RECURRENT: Chronic | ICD-10-CM

## 2024-08-30 DIAGNOSIS — E55.9 VITAMIN D DEFICIENCY: ICD-10-CM

## 2024-08-30 DIAGNOSIS — E53.8 B12 DEFICIENCY: Primary | Chronic | ICD-10-CM

## 2024-08-30 DIAGNOSIS — K04.7 DENTAL INFECTION: ICD-10-CM

## 2024-08-30 DIAGNOSIS — E78.2 MIXED HYPERLIPIDEMIA: Chronic | ICD-10-CM

## 2024-08-30 LAB
25(OH)D3 SERPL-MCNC: 40.9 NG/ML
VIT B12 SERPL-MCNC: 194 PG/ML (ref 193–986)

## 2024-08-30 PROCEDURE — 96372 THER/PROPH/DIAG INJ SC/IM: CPT | Mod: ,,, | Performed by: FAMILY MEDICINE

## 2024-08-30 PROCEDURE — 99214 OFFICE O/P EST MOD 30 MIN: CPT | Mod: ,,, | Performed by: FAMILY MEDICINE

## 2024-08-30 PROCEDURE — 82306 VITAMIN D 25 HYDROXY: CPT | Mod: ,,, | Performed by: CLINICAL MEDICAL LABORATORY

## 2024-08-30 PROCEDURE — 82607 VITAMIN B-12: CPT | Mod: ,,, | Performed by: CLINICAL MEDICAL LABORATORY

## 2024-08-30 RX ORDER — MEMANTINE HYDROCHLORIDE 10 MG/1
10 TABLET ORAL DAILY
COMMUNITY
Start: 2024-06-10

## 2024-08-30 RX ORDER — MECLIZINE HYDROCHLORIDE 25 MG/1
25 TABLET ORAL 3 TIMES DAILY PRN
Qty: 270 TABLET | Refills: 1 | Status: SHIPPED | OUTPATIENT
Start: 2024-08-30

## 2024-08-30 RX ORDER — MIRTAZAPINE 15 MG/1
1 TABLET, FILM COATED ORAL NIGHTLY
COMMUNITY

## 2024-08-30 RX ORDER — ONDANSETRON 4 MG/1
4 TABLET, ORALLY DISINTEGRATING ORAL EVERY 6 HOURS PRN
COMMUNITY
Start: 2024-03-18 | End: 2024-08-30 | Stop reason: SDUPTHER

## 2024-08-30 RX ORDER — ONDANSETRON 4 MG/1
4 TABLET, ORALLY DISINTEGRATING ORAL EVERY 8 HOURS PRN
Qty: 120 TABLET | Refills: 3 | Status: SHIPPED | OUTPATIENT
Start: 2024-08-30

## 2024-08-30 RX ORDER — OMEPRAZOLE 40 MG/1
40 CAPSULE, DELAYED RELEASE ORAL EVERY MORNING
COMMUNITY
Start: 2023-10-19

## 2024-08-30 RX ORDER — CEFTRIAXONE 1 G/1
1 INJECTION, POWDER, FOR SOLUTION INTRAMUSCULAR; INTRAVENOUS
Status: COMPLETED | OUTPATIENT
Start: 2024-08-30 | End: 2024-08-30

## 2024-08-30 RX ORDER — ATORVASTATIN CALCIUM 40 MG/1
40 TABLET, FILM COATED ORAL NIGHTLY
Qty: 90 TABLET | Refills: 1 | Status: SHIPPED | OUTPATIENT
Start: 2024-08-30 | End: 2025-02-26

## 2024-08-30 RX ORDER — MECLIZINE HYDROCHLORIDE 25 MG/1
25 TABLET ORAL EVERY 6 HOURS
COMMUNITY
Start: 2024-04-03 | End: 2024-08-30 | Stop reason: SDUPTHER

## 2024-08-30 RX ORDER — TEMAZEPAM 7.5 MG/1
CAPSULE ORAL
COMMUNITY
Start: 2024-07-11

## 2024-08-30 RX ORDER — SERTRALINE HYDROCHLORIDE 50 MG/1
50 TABLET, FILM COATED ORAL DAILY
Qty: 90 TABLET | Refills: 1 | Status: SHIPPED | OUTPATIENT
Start: 2024-08-30

## 2024-08-30 RX ORDER — AMOXICILLIN 500 MG/1
500 CAPSULE ORAL 3 TIMES DAILY
COMMUNITY
Start: 2024-08-28

## 2024-08-30 RX ADMIN — CEFTRIAXONE 1 G: 1 INJECTION, POWDER, FOR SOLUTION INTRAMUSCULAR; INTRAVENOUS at 02:08

## 2024-08-30 NOTE — PROGRESS NOTES
Raymon Tyler MD    [address] [place] [address]     PATIENT NAME: Tiny Gutierrez  : 1945  DATE: 24  MRN: [id number]      Billing Provider: Raymon Tyler MD  Level of Service: AR OFFICE/OUTPT VISIT, EST, LEVL IV, 30-39 MIN  Patient PCP Information       Provider PCP Type    Raymon Tyler MD General            Reason for Visit / Chief Complaint: Nephrotic Syndrome (3 month follow up. ), Dental Pain (Left tooth infection. Went to the dentists and was put on an abx. He has redness and swelling around chin. States he does not feel well from that infection. 10/10 pain level. He has been trying Norco. ), Nausea (Requesting something to make him relax and for his nausea. ), Headache (When turning head to the right he feels a sharp pain. 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 ), Labs Only (Requesting B 12 lab and lipid panel to be drawn.), and Dizziness (Always feel like he's going to fall. )       Update PCP  Update Chief Complaint         History of Present Illness / Problem Focused Workflow     Tiny Gutierrez presents to the clinic with Nephrotic Syndrome (3 month follow up. ), Dental Pain (Left tooth infection. Went to the dentists and was put on an abx. He has redness and swelling around chin. States he does not feel well from that infection. 10/10 pain level. He has been trying Norco. ), Nausea (Requesting something to make him relax and for his nausea. ), Headache (When turning head to the right he feels a sharp pain. 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 ), Labs Only (Requesting B 12 lab and lipid panel to be drawn.), and Dizziness (Always feel like he's going to fall. )     Dental pain and infection - not responding to antibiotics that was given to him by his dentist, the pain is severe and not responding to hydrocodone. His lower jaw, anteriorly is swollen, obvious deformity from the swelling. He continues to take amoxicillin and  today is his 3rd day of treatment     Headache when turning his head to the right, located on the right side of his forehead, had a CT scan done for this, he has started neck PT for these symptoms. Took a couple of steroid packs and did great while on them     Wants some labs drawn today, having a lot of dizziness.         HPI    Review of Systems     Review of Systems   Constitutional:  Positive for fatigue. Negative for activity change, appetite change, chills and fever.   HENT:  Negative for nasal congestion, ear pain, hearing loss, postnasal drip and sore throat.    Respiratory:  Negative for cough, chest tightness, shortness of breath and wheezing.    Cardiovascular:  Negative for chest pain, palpitations, leg swelling and claudication.   Gastrointestinal:  Negative for abdominal pain, change in bowel habit, constipation, diarrhea, nausea and vomiting.   Genitourinary:  Negative for dysuria.   Musculoskeletal:  Negative for arthralgias, back pain, gait problem and myalgias.   Integumentary:  Negative for rash.   Neurological:  Positive for dizziness. Negative for weakness and headaches.   Psychiatric/Behavioral:  Negative for suicidal ideas. The patient is not nervous/anxious.         Medical / Social / Family History     Past Medical History:   Diagnosis Date    Aortic heart murmur     Asthma     Cancer     Deep vein thrombosis     Emphysema/COPD     Hyperlipidemia     Ischemic cardiomyopathy     Mitral regurgitation     Old myocardial infarction 03/2001    Tricuspid regurgitation     Vitamin B 12 deficiency     Vitamin D deficiency        Past Surgical History:   Procedure Laterality Date    CARDIAC CATHETERIZATION      CARDIAC DEFIBRILLATOR PLACEMENT      CHOLECYSTECTOMY      CORONARY ARTERY BYPASS GRAFT      TOTAL KNEE ARTHROPLASTY Bilateral        Social History    reports that he has never smoked. He has never been exposed to tobacco smoke. He has never used smokeless tobacco. He reports that he does  not drink alcohol and does not use drugs.    Family History  's family history includes Emphysema in his father.    Medications and Allergies     Medications  Outpatient Medications Marked as Taking for the 8/30/24 encounter (Office Visit) with Raymon Tyler MD   Medication Sig Dispense Refill    albuterol (PROVENTIL) 2.5 mg /3 mL (0.083 %) nebulizer solution Take 3 mLs (2.5 mg total) by nebulization as needed for Wheezing or Shortness of Breath. 300 mL 5    albuterol (PROVENTIL/VENTOLIN HFA) 90 mcg/actuation inhaler 2 puffs as needed      amoxicillin (AMOXIL) 500 MG capsule Take 500 mg by mouth 3 (three) times daily.      apixaban (ELIQUIS) 2.5 mg Tab Take 2.5 mg by mouth 2 (two) times daily.      BREO ELLIPTA 200-25 mcg/dose DsDv diskus inhaler 1 puff once daily.      calcium carbonate/vitamin D3 (CALTRATE 600 + D ORAL) Take 1 capsule by mouth Daily.      CREON 24,000-76,000 -120,000 unit capsule Take 1 capsule by mouth 3 (three) times daily.      dapagliflozin propanediol (FARXIGA) 10 mg tablet Take 1 tablet (10 mg total) by mouth once daily. 30 tablet 5    dapagliflozin propanediol (FARXIGA) 10 mg tablet Take 1 tablet (10 mg total) by mouth once daily. 28 tablet 0    dapagliflozin propanediol (FARXIGA) 10 mg tablet Take 1 tablet (10 mg total) by mouth once daily. 28 tablet 0    FEROSUL 325 mg (65 mg iron) Tab tablet Take 1 tablet by mouth 3 (three) times daily.      gas permeable  (OXYGEN PERMEABLE  MISC) Oxygen      LORazepam (ATIVAN) 0.5 MG tablet Take 1 tablet (0.5 mg total) by mouth every 12 (twelve) hours as needed (for ANXIETY and AGITATION). 30 tablet 1    magnesium oxide (MAG-OX) 400 mg (241.3 mg magnesium) tablet Take 1 tablet by mouth every morning.      memantine (NAMENDA) 10 MG Tab Take 10 mg by mouth once daily.      mirtazapine (REMERON) 15 MG tablet Take 1 tablet by mouth every evening.      omeprazole (PRILOSEC) 40 MG capsule Take 40 mg by mouth every morning.       sacubitriL-valsartan (ENTRESTO) 24-26 mg per tablet Take 1 tablet by mouth 2 (two) times daily. 60 tablet 5    sacubitriL-valsartan (ENTRESTO) 24-26 mg per tablet Take 1 tablet by mouth 2 (two) times daily. 112 tablet 0    spironolactone (ALDACTONE) 50 MG tablet Take 1 tablet (50 mg total) by mouth Daily. 90 tablet 3    temazepam (RESTORIL) 7.5 MG Cap TAKE 1 CAPSULE BY MOUTH AT BEDTIME FOR INSOMNIA AS NEEDED      [DISCONTINUED] meclizine (ANTIVERT) 25 mg tablet Take 25 mg by mouth every 6 (six) hours.      [DISCONTINUED] ondansetron (ZOFRAN-ODT) 4 MG TbDL Take 4 mg by mouth every 6 (six) hours as needed (Nausea.).      [DISCONTINUED] sertraline (ZOLOFT) 50 MG tablet Take 1 tablet (50 mg total) by mouth once daily. 90 tablet 1       Allergies  Review of patient's allergies indicates:   Allergen Reactions    Hay fever and allergy relief Rash    Pollen extracts Rash       Physical Examination     Vitals:    08/30/24 1327   BP: (!) 94/59   Pulse: 73   Resp: 17   Temp: 98.6 °F (37 °C)     Physical Exam  Vitals and nursing note reviewed.   Constitutional:       General: He is not in acute distress.     Appearance: Normal appearance. He is not ill-appearing.   HENT:      Head:        Comments: Erythema, swelling, pain in the lower anterior jaw  Eyes:      Extraocular Movements: Extraocular movements intact.      Pupils: Pupils are equal, round, and reactive to light.   Cardiovascular:      Rate and Rhythm: Normal rate and regular rhythm.   Pulmonary:      Effort: Pulmonary effort is normal.      Breath sounds: Normal breath sounds.   Skin:     General: Skin is warm.      Findings: No rash.   Neurological:      General: No focal deficit present.      Mental Status: He is alert and oriented to person, place, and time. Mental status is at baseline.   Psychiatric:         Mood and Affect: Mood normal.         Behavior: Behavior normal.            Assessment and Plan (including Health Maintenance)      Problem List  Smart  Sets  Document Outside HM   :    Plan:         Health Maintenance Due   Topic Date Due    Shingles Vaccine (1 of 2) Never done    RSV Vaccine (Age 60+ and Pregnant patients) (1 - 1-dose 60+ series) Never done    TETANUS VACCINE  09/17/2012    COVID-19 Vaccine (3 - Moderna risk series) 12/20/2021    Influenza Vaccine (1) 09/01/2024    Lipid Panel  09/03/2024       Problem List Items Addressed This Visit          Psychiatric    Depression, recurrent (Chronic)    Relevant Medications    sertraline (ZOLOFT) 50 MG tablet       Cardiac/Vascular    Mixed hyperlipidemia (Chronic)    Relevant Medications    atorvastatin (LIPITOR) 40 MG tablet       Endocrine    B12 deficiency - Primary (Chronic)    Relevant Orders    Vitamin B12 (Completed)     Other Visit Diagnoses       Vitamin D deficiency        Relevant Orders    Vitamin D (Completed)    Dental infection        Relevant Medications    cefTRIAXone injection 1 g (Completed)            Health Maintenance Topics with due status: Not Due       Topic Last Completion Date    Colonoscopy 05/23/2024       Procedures     Future Appointments   Date Time Provider Department Center   10/15/2024  2:15 PM [facility] MD [facility]   10/17/2024 10:00 AM Shahrzad Reynaga NP [facility]        Follow up in about 3 months (around 11/30/2024), or if symptoms worsen or fail to improve, for chronic health problems.     Signature:  Raymon Tyler MD  [address] [place] [address]  Phone #: [phone]  Fax #: [phone]    Date of encounter: 8/30/24    There are no Patient Instructions on file for this visit.

## 2024-09-03 PROBLEM — E53.8 B12 DEFICIENCY: Chronic | Status: ACTIVE | Noted: 2024-09-03

## 2024-09-04 ENCOUNTER — TELEPHONE (OUTPATIENT)
Dept: CARDIOLOGY | Facility: CLINIC | Age: 79
End: 2024-09-04
Payer: MEDICARE

## 2024-09-04 NOTE — TELEPHONE ENCOUNTER
----- Message from Pam Schulz sent at 9/3/2024  3:07 PM CDT -----  Regarding: Pt.'s wife wants to speak with the nurse or Dr. Hughes  Who Called: Taylor Gutierrez    Caller is requesting assistance/information from provider's office.    Symptoms (please be specific): Pt. Is feeling bad and legs are swollen. Pt. Also has a tooth infection.  How long has patient had these symptoms:  Problem been going on for 2 to 3 weeks.     Preferred Method of Contact: Phone Call  Patient's Preferred Phone Number on File: [phone]   Best Call Back Number, if different:  Additional Information: Pt. Is on antibiotics due to tooth infection. Pt.'s wife is wondering if the diff. Rx pt. Is taking is making him sick. She wants to speak to a doctor or nurse because she does not know what to do. I told her if he is really feeling bad to take him to the ER.    Dr. Hughes reviewed records.  He recommends pt drink plenty of water,  take antibiotics until infection is gone, and may use Ibuprofen 400 mg TID x 2-3 days.      Attempted to call but no answer.  Pippa MULLIGAN RN will try to call again tomorrow.

## 2024-09-06 NOTE — TELEPHONE ENCOUNTER
Pt wife notified and verbalized understanding to increase water intake, take antibiotics until infection gone, and he may take ibuprofen 400mg TID x 2-3 days. She states pt is just weak, fatigued, and dizzy. I explained that if patient had an infection it could cause this. She is wanting to know if any of his medications are interacting causing these symptoms. She wants Dr. Hughes to look over everything. Pt wife was notified that Dr. Hughes is out today, but I would get the message to Deysi on Monday.     None

## 2024-09-09 DIAGNOSIS — E53.8 B12 DEFICIENCY: Primary | ICD-10-CM

## 2024-09-09 NOTE — TELEPHONE ENCOUNTER
----- Message from Raymon Tyler MD sent at 9/3/2024 10:23 AM CDT -----  B12 deficiency. Start B12 injections, 1ml weekly for 8 weeks, THEN one mL every other week for 8 doses, THEN one mL monthly to complete a full year of treatment

## 2024-09-10 RX ORDER — CYANOCOBALAMIN 1000 UG/ML
1000 INJECTION, SOLUTION INTRAMUSCULAR; SUBCUTANEOUS
Qty: 1 ML | Refills: 5 | Status: SHIPPED | OUTPATIENT
Start: 2024-09-10

## 2024-10-01 ENCOUNTER — EXTERNAL HOME HEALTH (OUTPATIENT)
Dept: HOME HEALTH SERVICES | Facility: HOSPITAL | Age: 79
End: 2024-10-01
Payer: MEDICARE

## 2024-10-01 ENCOUNTER — TELEPHONE (OUTPATIENT)
Dept: CARDIOLOGY | Facility: CLINIC | Age: 79
End: 2024-10-01
Payer: MEDICARE

## 2024-10-01 NOTE — TELEPHONE ENCOUNTER
----- Message from Pam sent at 10/1/2024 12:27 PM CDT -----  Regarding: Rx  Who Called: Taylor Gutierrez    Caller is requesting assistance/information from provider's office.    Symptoms (please be specific):Deysi Patient wife is calling to see if you have any sample of Farxiga, she will be in town next Wednesday  How long has patient had these symptoms:    List of preferred pharmacies on file (remove unneeded): [unfilled]  If different, enter pharmacy into here including location and phone number:       Preferred Method of Contact: Phone Call  Patient's Preferred Phone Number on File: [phone] or [phone]  Best Call Back Number, if different:  Additional Information:    4 boxes of samples for pt. Patient notified.

## 2024-10-02 RX ORDER — DAPAGLIFLOZIN 10 MG/1
10 TABLET, FILM COATED ORAL DAILY
Qty: 28 TABLET | Refills: 0 | COMMUNITY
Start: 2024-10-02

## 2024-10-15 ENCOUNTER — OFFICE VISIT (OUTPATIENT)
Dept: CARDIOLOGY | Facility: CLINIC | Age: 79
End: 2024-10-15
Payer: MEDICARE

## 2024-10-15 VITALS
SYSTOLIC BLOOD PRESSURE: 90 MMHG | DIASTOLIC BLOOD PRESSURE: 56 MMHG | HEIGHT: 73 IN | OXYGEN SATURATION: 96 % | HEART RATE: 76 BPM | BODY MASS INDEX: 27.41 KG/M2 | WEIGHT: 206.81 LBS

## 2024-10-15 DIAGNOSIS — C61 PROSTATE CANCER: Chronic | ICD-10-CM

## 2024-10-15 DIAGNOSIS — I25.5 ISCHEMIC CARDIOMYOPATHY: Chronic | ICD-10-CM

## 2024-10-15 DIAGNOSIS — N04.9 NEPHROTIC SYNDROME: Chronic | ICD-10-CM

## 2024-10-15 DIAGNOSIS — I48.92 ATRIAL FLUTTER, UNSPECIFIED TYPE: Chronic | ICD-10-CM

## 2024-10-15 DIAGNOSIS — E78.2 MIXED HYPERLIPIDEMIA: Chronic | ICD-10-CM

## 2024-10-15 DIAGNOSIS — C25.9 MALIGNANT NEOPLASM OF PANCREAS, UNSPECIFIED LOCATION OF MALIGNANCY: Chronic | ICD-10-CM

## 2024-10-15 DIAGNOSIS — I10 PRIMARY HYPERTENSION: Chronic | ICD-10-CM

## 2024-10-15 DIAGNOSIS — I25.10 CORONARY ARTERY DISEASE INVOLVING NATIVE CORONARY ARTERY OF NATIVE HEART WITHOUT ANGINA PECTORIS: Primary | Chronic | ICD-10-CM

## 2024-10-15 DIAGNOSIS — C24.0 CANCER OF COMMON BILE DUCT: Chronic | ICD-10-CM

## 2024-10-15 DIAGNOSIS — J44.9 CHRONIC OBSTRUCTIVE PULMONARY DISEASE, UNSPECIFIED COPD TYPE: Chronic | ICD-10-CM

## 2024-10-15 PROCEDURE — 99999 PR PBB SHADOW E&M-EST. PATIENT-LVL III: CPT | Mod: PBBFAC,,, | Performed by: INTERNAL MEDICINE

## 2024-10-15 PROCEDURE — 99214 OFFICE O/P EST MOD 30 MIN: CPT | Mod: S$PBB,,, | Performed by: INTERNAL MEDICINE

## 2024-10-15 PROCEDURE — 99213 OFFICE O/P EST LOW 20 MIN: CPT | Mod: PBBFAC | Performed by: INTERNAL MEDICINE

## 2024-10-16 NOTE — PROGRESS NOTES
PCP: Raymon Tyler MD        Subjective:   Tiny Gutierrez is a 78 y.o. male with hx of CAD s/p CABG (2007), iCMP s/p ICD, HTN, HLD, CKD, COPD, prostate cancer, pancreatic cancer s/p whipple surgery, LV thrombus ( 7/18/2021 echo, [facility] [place] [place]) and atrial flutter,  who presents for 3 month follow up.      7/10/24--Tiny Gutierrez is a 78 y.o. male with hx of CAD s/p CABG (2007), iCMP s/p ICD, HTN, HLD, CKD, COPD, prostate cancer, pancreatic cancer s/p whipple surgery, LV thrombus ( 7/18/2021 echo, [facility] [place]) and atrial flutter,  who presents for 3 week follow up.      [facility]rez is a 78 y.o. male with hx of CAD s/p CABG (2007), iCMP s/p ICD, HTN, HLD, CKD, COPD, prostate cancer, pancreatic cancer, LV thrombus ( 7/18/2021 echo, [facility] [place]) and atrial flutter,  who presents for HD follow up from [facility]  2 times in the last 2 months. His mos recent admission was 4/18/2024 for diuresis after gaining 30 lbs in a week. He had been diuresed 50 lb s in a week prior to dc initially.  He also had a paracentesis on the first admission but this was not required  upon readmission. He was diuresed and discharged home.   The patient states that overall he feels some better. He continues to have some SOB at times and lower extremity edema that resolves at HS. He reports that his daily weights have been stable.       This is a TCC visit. Records from [facility] and labs [facility] were reviewed as well as meds were reviewed and reconciled.    1/26/2024 (Dr. Hughes) presents for  6 month follow up.       9/23/23--Tiny Gutierrez is a 78 y.o. male with hx of CAD s/p CABG (2007), iCMP s/p ICD, HTN, HLD, CKD, COPD, prostate cancer, pancreatic cancer, LV thrombus ( 7/18/2021 echo[place] [facility] [place] and atrial flutter,  who presents for follow up after last hospitalization.      8/2/23--Tiny Gutierrez is a 79 y.o. male with hx of CAD s/p CABG (2007), iCMP s/p ICD,  "HTN, HLD, asthma, CKD, COPD, prostate cancer, pancreatic cancer, LV thrombus ( 7/18/2021 echo[facility] [place] and atrial flutter,  who presents for return check requested for severe BLE edema. He denies chest pain , orthopna or PND but has chronic, stable CONRAD. The lower ext edema began approx 3 weeks ago after starting a "cancer med". This med has been stopped by his oncologist and lasix dose increased to 40 mg with the addition of aldactone 50 mg po daily with improvement but not resolution of the edema. His serum albumin is also 2.1 which is a contributing factor. An echocardiogram was done earlier today and results are pending.         Fhx:  Family History   Problem Relation Name Age of Onset    Emphysema Father       Shx:   Social History     Socioeconomic History    Marital status:    Tobacco Use    Smoking status: Never     Passive exposure: Never    Smokeless tobacco: Never   Substance and Sexual Activity    Alcohol use: Never    Drug use: Never    Sexual activity: Yes     Partners: Female     Social Drivers of Health     Financial Resource Strain: Low Risk  (4/18/2024)    [facility]    Overall Financial Resource Strain (CARDIA)     Difficulty of Paying Living Expenses: Not hard at all   Food Insecurity: No Food Insecurity (4/18/2024)    Received from [facility]    Hunger Vital Sign     Worried About Running Out of Food in the Last Year: Never true     Ran Out of Food in the Last Year: Never true   Transportation Needs: No Transportation Needs (4/18/2024)    Received from [facility] and [facility]    PRAPARE - Transportation     Lack of Transportation (Medical): No     Lack of Transportation (Non-Medical): No   Housing Stability: Not At Risk (3/22/2024)    Received from " [facility]    BOC Housing Stability Source     Housing Insecurity: 1       EKG   Results for orders placed or performed in visit on 07/10/24   EKG 12-lead    Collection Time: 07/10/24 10:06 AM   Result Value Ref Range    QRS Duration 98 ms    OHS QTC Calculation 413 ms    Narrative    Test Reason : I48.92,    Vent. Rate : 066 BPM     Atrial Rate : 066 BPM     P-R Int : 192 ms          QRS Dur : 098 ms      QT Int : 394 ms       P-R-T Axes : 074 119 -17 degrees     QTc Int : 413 ms    Atrial-paced rhythm with occasional sinus complexes and with occasional   Premature ventricular complexes  Incomplete right bundle branch block  Right ventricular hypertrophy with repolarization abnormality  Septal infarct ,age undetermined  T wave abnormality, consider inferior ischemia  Abnormal ECG  When compared with ECG of 21-MAY-2024 11:06,  PREVIOUS ECG IS PRESENT  Confirmed by James Hughes MD ([id number]) on 7/11/2024 8:41:13 AM    Referred By:             Confirmed By:James Hughes MD      9/26/23--NSR, rightward axis, 85 bpm     Echo     5/21/24--Interpretation Summary    Left Ventricle: The left ventricle is normal in size. Increased wall thickness. Severe global hypokinesis present. There is severely reduced systolic function with a visually estimated ejection fraction of 20 - 25%. Ejection fraction by visual approximation is 25%. Grade I diastolic dysfunction.    Right Ventricle: Normal right ventricular cavity size. Wall thickness is normal. Systolic function is borderline low.    Left Atrium: Left atrium is dilated.    Right Atrium: Right atrium is dilated.    Aortic Valve: The aortic valve is a trileaflet valve. There is severe aortic valve sclerosis. Mildly restricted motion. There is mild to moderate stenosis. Aortic valve area by VTI is 1.75 cm². Aortic valve peak velocity is 1.64 m/s. Mean gradient is 6 mmHg. The dimensionless index is 0.64. There is mild aortic regurgitation.     Tricuspid Valve: There is moderate to severe regurgitation.    Pulmonic Valve: There is mild regurgitation.    IVC/SVC: Normal venous pressure at 3 mmHg.        3/29/2024 at [facility].  Technically difficult study with limited views.  Contrasted images   utilized.   Normal LV size with severely reduced systolic function.  Estimated LVEF   25%.   There is akinesis of the mid to apical anteroseptal wall and entire apex.   There is a small apical thrombus on contrasted images.   No significant valvular abnormalities.   Moderate biatrial enlargement.  Echo    8/30/23--EF 40%.  Focal apical akinesis.  LV mildly dilated. Left atrium moderately dilated.  Mild AS.  Mild MR.     8/2/23--Interpretation Summary    Left Ventricle: regional wall motion abnormalities present. There is mildly reduced systolic function. Ejection fraction by visual approximation is 40%. There is diastolic dysfunction.    Left Atrium: Left atrium is mildly dilated.    Right Atrium: Right atrium is mildly dilated. Catheter present in the right atrium.    Aortic Valve: There is moderate aortic valve sclerosis.    Mitral Valve: There is mild to moderate regurgitation.    Tricuspid Valve: There is mild transvalvular regurgitation.      2/17/23--Interpretation Summary  · Mild right ventricular enlargement with normal right ventricular systolic function.  · Moderate mitral regurgitation.  · Mild to moderate tricuspid regurgitation.  · Normal central venous pressure (3 mmHg).  · The estimated PA systolic pressure is 28 mmHg.  · Left ventricular diastolic dysfunction.  · Mildly decreased systolic function.  · The estimated ejection fraction is 40%.  · There are segmental left ventricular wall motion abnormalities.  · Mild left atrial enlargement.    [facility] 3/20/15--3 V CAD with patent grafts to the LAD and obtuse marginal.  Moderate LV systolic dysfunction, LVEF 35-40%.  Normal LV hemodynamics.      3/28/07--3 V CAD.  Segmental wall motion abnormality with severely  reduced LVSF, EF 20%.      CABG:     3/29/07--LIMA to LAD, SVG to RCA and OM.     ICD:     10/3/19--R/R end of life ICD with new MRI compatible ICD.      8/8/13--R/ R end of life ICD with new dual chamber ICD.     1/15/09--implantation of dual chamber PPM ICD.  .        Lab Results   Component Value Date     07/16/2024    K 4.2 07/16/2024     (H) 07/16/2024    CO2 31 07/16/2024    BUN 21 (H) 07/16/2024    CREATININE 0.98 07/16/2024    CALCIUM 9.0 07/16/2024    ANIONGAP 6 (L) 07/16/2024    ESTGFRAFRICA 77 04/19/2024    EGFRNONAA 67 10/27/2021       Lab Results   Component Value Date    CHOL 185 10/27/2021    CHOL 119 08/18/2020     Lab Results   Component Value Date    HDL 54 10/27/2021    HDL 44 08/18/2020     Lab Results   Component Value Date    LDLCALC 112 10/27/2021    LDLCALC 57 08/18/2020     Lab Results   Component Value Date    TRIG 97 10/27/2021    TRIG 90 08/18/2020     Lab Results   Component Value Date    CHOLHDL 3.4 10/27/2021    CHOLHDL 2.7 08/18/2020       Lab Results   Component Value Date    WBC 5.49 07/16/2024    HGB 10.7 (L) 07/16/2024    HCT 33.9 (L) 07/16/2024    MCV 98.8 (H) 07/16/2024     (L) 07/16/2024           Current Outpatient Medications:     albuterol (PROVENTIL) 2.5 mg /3 mL (0.083 %) nebulizer solution, Take 3 mLs (2.5 mg total) by nebulization as needed for Wheezing or Shortness of Breath., Disp: 300 mL, Rfl: 5    albuterol (PROVENTIL/VENTOLIN HFA) 90 mcg/actuation inhaler, 2 puffs as needed, Disp: , Rfl:     apixaban (ELIQUIS) 2.5 mg Tab, Take 2.5 mg by mouth 2 (two) times daily., Disp: , Rfl:     atorvastatin (LIPITOR) 40 MG tablet, Take 1 tablet (40 mg total) by mouth every evening. For CHOLESTEROL, Disp: 90 tablet, Rfl: 1    BREO ELLIPTA 200-25 mcg/dose DsDv diskus inhaler, 1 puff once daily., Disp: , Rfl:     calcium carbonate/vitamin D3 (CALTRATE 600 + D ORAL), Take 1 capsule by mouth Daily., Disp: , Rfl:     CREON 24,000-76,000 -120,000 unit capsule, Take 1  capsule by mouth 3 (three) times daily., Disp: , Rfl:     cyanocobalamin 1,000 mcg/mL injection, Inject 1 mL (1,000 mcg total) into the muscle every 30 days., Disp: 1 mL, Rfl: 5    dapagliflozin propanediol (FARXIGA) 10 mg tablet, Take 1 tablet (10 mg total) by mouth once daily., Disp: 30 tablet, Rfl: 5    dapagliflozin propanediol (FARXIGA) 10 mg tablet, Take 1 tablet (10 mg total) by mouth once daily., Disp: 28 tablet, Rfl: 0    dapagliflozin propanediol (FARXIGA) 10 mg tablet, Take 1 tablet (10 mg total) by mouth once daily., Disp: 28 tablet, Rfl: 0    dapagliflozin propanediol (FARXIGA) 10 mg tablet, Take 1 tablet (10 mg total) by mouth once daily., Disp: 28 tablet, Rfl: 0    FEROSUL 325 mg (65 mg iron) Tab tablet, Take 1 tablet by mouth 3 (three) times daily., Disp: , Rfl:     magnesium oxide (MAG-OX) 400 mg (241.3 mg magnesium) tablet, Take 1 tablet by mouth every morning., Disp: , Rfl:     memantine (NAMENDA) 10 MG Tab, Take 10 mg by mouth once daily., Disp: , Rfl:     omeprazole (PRILOSEC) 40 MG capsule, Take 40 mg by mouth every morning., Disp: , Rfl:     sacubitriL-valsartan (ENTRESTO) 24-26 mg per tablet, Take 1 tablet by mouth 2 (two) times daily., Disp: 60 tablet, Rfl: 5    sacubitriL-valsartan (ENTRESTO) 24-26 mg per tablet, Take 1 tablet by mouth 2 (two) times daily., Disp: 112 tablet, Rfl: 0    sertraline (ZOLOFT) 50 MG tablet, Take 1 tablet (50 mg total) by mouth once daily. For MOOD, Disp: 90 tablet, Rfl: 1    spironolactone (ALDACTONE) 50 MG tablet, Take 1 tablet (50 mg total) by mouth Daily., Disp: 90 tablet, Rfl: 3    amoxicillin (AMOXIL) 500 MG capsule, Take 500 mg by mouth 3 (three) times daily. (Patient not taking: Reported on 10/15/2024), Disp: , Rfl:     gas permeable  (OXYGEN PERMEABLE  MISC), Oxygen, Disp: , Rfl:     LORazepam (ATIVAN) 0.5 MG tablet, Take 1 tablet (0.5 mg total) by mouth every 12 (twelve) hours as needed (for ANXIETY and AGITATION)., Disp: 30  "tablet, Rfl: 1    meclizine (ANTIVERT) 25 mg tablet, Take 1 tablet (25 mg total) by mouth 3 (three) times daily as needed for Dizziness., Disp: 270 tablet, Rfl: 1    mirtazapine (REMERON) 15 MG tablet, Take 1 tablet by mouth every evening., Disp: , Rfl:     ondansetron (ZOFRAN-ODT) 4 MG TbDL, Take 1 tablet (4 mg total) by mouth every 8 (eight) hours as needed (Nausea.)., Disp: 120 tablet, Rfl: 3    temazepam (RESTORIL) 7.5 MG Cap, TAKE 1 CAPSULE BY MOUTH AT BEDTIME FOR INSOMNIA AS NEEDED, Disp: , Rfl:   Medications reconciled by pt's list.      Review of Systems   Respiratory:  Positive for shortness of breath.    Cardiovascular:  Negative for chest pain, palpitations and leg swelling.   Neurological:  Positive for dizziness. Negative for loss of consciousness.       Objective:   BP (!) 90/56   Pulse 76   Ht 6' 1" (1.854 m)   Wt 93.8 kg (206 lb 12.8 oz)   SpO2 96%   BMI 27.28 kg/m²     Physical Exam  Vitals reviewed.   Constitutional:       General: He is not in acute distress.     Appearance: Normal appearance.   HENT:      Head: Normocephalic and atraumatic.   Neck:      Vascular: No carotid bruit or JVD.   Cardiovascular:      Rate and Rhythm: Normal rate and regular rhythm.      Pulses: Normal pulses.           Radial pulses are 2+ on the right side and 2+ on the left side.        Dorsalis pedis pulses are 2+ on the right side and 2+ on the left side.      Heart sounds: Murmur heard.      Comments: II/VI REHANA RUSB        Pulmonary:      Effort: Pulmonary effort is normal.      Breath sounds: Normal breath sounds.   Chest:      Comments: Well healed ICD site to left upper chest wall  Musculoskeletal:      Right lower leg: No edema.      Left lower leg: No edema.   Skin:     General: Skin is warm and dry.   Neurological:      General: No focal deficit present.      Mental Status: He is alert.           Assessment:           1. Coronary artery disease involving native coronary artery of native heart without " angina pectoris      s/p MI 3/01      2. Atrial flutter, unspecified type        3. Primary hypertension        4. Ischemic cardiomyopathy      EF 25%, s/p ICD      5. Mixed hyperlipidemia        6. Cancer of common bile duct        7. Chronic obstructive pulmonary disease, unspecified COPD type        8. Nephrotic syndrome        9. Prostate cancer        10. Malignant neoplasm of pancreas, unspecified location of malignancy          PLAN:  Continue current medications  Continue ICD monitoring as scheduled.  Increase po water intake  F/u in 6 months.

## 2024-10-17 ENCOUNTER — OFFICE VISIT (OUTPATIENT)
Dept: FAMILY MEDICINE | Facility: CLINIC | Age: 79
End: 2024-10-17
Payer: MEDICARE

## 2024-10-17 VITALS
BODY MASS INDEX: 27.46 KG/M2 | DIASTOLIC BLOOD PRESSURE: 62 MMHG | TEMPERATURE: 97 F | HEIGHT: 73 IN | HEART RATE: 76 BPM | OXYGEN SATURATION: 98 % | SYSTOLIC BLOOD PRESSURE: 111 MMHG | WEIGHT: 207.19 LBS | RESPIRATION RATE: 16 BRPM

## 2024-10-17 DIAGNOSIS — C25.9 MALIGNANT NEOPLASM OF PANCREAS, UNSPECIFIED LOCATION OF MALIGNANCY: Chronic | ICD-10-CM

## 2024-10-17 DIAGNOSIS — F03.918 DEMENTIA WITH BEHAVIORAL DISTURBANCE: Primary | Chronic | ICD-10-CM

## 2024-10-17 DIAGNOSIS — E53.8 B12 DEFICIENCY: ICD-10-CM

## 2024-10-17 DIAGNOSIS — G45.9 TRANSIENT CEREBRAL ISCHEMIA, UNSPECIFIED TYPE: ICD-10-CM

## 2024-10-17 DIAGNOSIS — R29.90 STROKE-LIKE SYMPTOMS: ICD-10-CM

## 2024-10-17 LAB
CREAT SERPL-MCNC: 1.06 MG/DL (ref 0.7–1.3)
EGFR (NO RACE VARIABLE) (RUSH/TITUS): 71 ML/MIN/1.73M2

## 2024-10-17 PROCEDURE — 99214 OFFICE O/P EST MOD 30 MIN: CPT | Mod: ,,, | Performed by: FAMILY MEDICINE

## 2024-10-17 PROCEDURE — 82565 ASSAY OF CREATININE: CPT | Mod: ,,, | Performed by: CLINICAL MEDICAL LABORATORY

## 2024-10-17 RX ORDER — CYANOCOBALAMIN 1000 UG/ML
1000 INJECTION, SOLUTION INTRAMUSCULAR; SUBCUTANEOUS
Qty: 10 ML | Refills: 5 | Status: SHIPPED | OUTPATIENT
Start: 2024-10-17

## 2024-10-17 NOTE — PROGRESS NOTES
Raymon Tyler MD    [address] [place] [address]     PATIENT NAME: Tiny Gutierrez  : 1945  DATE: 10/17/24  MRN: [id number]      Billing Provider: Raymon Tyler MD  Level of Service: NE OFFICE/OUTPT VISIT, EST, LEVL IV, 30-39 MIN  Patient PCP Information       Provider PCP Type    Raymon Tyler MD General            Reason for Visit / Chief Complaint: Otalgia (Shooting pain started in his right ear now he states it is happening in both ears. He has been having some dizziness and headaches. Started about 2 months ago)       Update PCP  Update Chief Complaint         History of Present Illness / Problem Focused Workflow     Tiny Gutierrez presents to the clinic with Otalgia (Shooting pain started in his right ear now he states it is happening in both ears. He has been having some dizziness and headaches. Started about 2 months ago)     His balance seems off, he can lean to the left at times, until he falls over. The world does spin sometimes but other times he just does not feel stable on his feet. No symptoms after the fall. Mainly just a balance issue         HPI    Review of Systems     Review of Systems   Constitutional:  Negative for activity change, appetite change, chills, fatigue and fever.   HENT:  Positive for ear pain. Negative for nasal congestion, hearing loss, postnasal drip and sore throat.    Respiratory:  Negative for cough, chest tightness, shortness of breath and wheezing.    Cardiovascular:  Negative for chest pain, palpitations, leg swelling and claudication.   Gastrointestinal:  Negative for abdominal pain, change in bowel habit, constipation, diarrhea, nausea and vomiting.   Genitourinary:  Negative for dysuria.   Musculoskeletal:  Negative for arthralgias, back pain, gait problem and myalgias.   Integumentary:  Negative for rash.   Neurological:  Positive for dizziness and light-headedness. Negative for weakness  and headaches.   Psychiatric/Behavioral:  Negative for suicidal ideas. The patient is not nervous/anxious.         Medical / Social / Family History     Past Medical History:   Diagnosis Date    Aortic heart murmur     Asthma     Cancer     Deep vein thrombosis     Emphysema/COPD     Hyperlipidemia     Ischemic cardiomyopathy     Mitral regurgitation     Old myocardial infarction 03/2001    Tricuspid regurgitation     Vitamin B 12 deficiency     Vitamin D deficiency        Past Surgical History:   Procedure Laterality Date    CARDIAC CATHETERIZATION      CARDIAC DEFIBRILLATOR PLACEMENT      CHOLECYSTECTOMY      CORONARY ARTERY BYPASS GRAFT      TOTAL KNEE ARTHROPLASTY Bilateral        Social History    reports that he has never smoked. He has never been exposed to tobacco smoke. He has never used smokeless tobacco. He reports that he does not drink alcohol and does not use drugs.    Family History  's family history includes Emphysema in his father.    Medications and Allergies     Medications  Outpatient Medications Marked as Taking for the 10/17/24 encounter (Office Visit) with Raymon Tyler MD   Medication Sig Dispense Refill    albuterol (PROVENTIL) 2.5 mg /3 mL (0.083 %) nebulizer solution Take 3 mLs (2.5 mg total) by nebulization as needed for Wheezing or Shortness of Breath. 300 mL 5    albuterol (PROVENTIL/VENTOLIN HFA) 90 mcg/actuation inhaler 2 puffs as needed      apixaban (ELIQUIS) 2.5 mg Tab Take 2.5 mg by mouth 2 (two) times daily.      atorvastatin (LIPITOR) 40 MG tablet Take 1 tablet (40 mg total) by mouth every evening. For CHOLESTEROL 90 tablet 1    BREO ELLIPTA 200-25 mcg/dose DsDv diskus inhaler 1 puff once daily.      calcium carbonate/vitamin D3 (CALTRATE 600 + D ORAL) Take 1 capsule by mouth Daily.      CREON 24,000-76,000 -120,000 unit capsule Take 1 capsule by mouth 3 (three) times daily.      dapagliflozin propanediol (FARXIGA) 10 mg tablet Take 1 tablet (10 mg total) by mouth  once daily. 28 tablet 0    dapagliflozin propanediol (FARXIGA) 10 mg tablet Take 1 tablet (10 mg total) by mouth once daily. 28 tablet 0    dapagliflozin propanediol (FARXIGA) 10 mg tablet Take 1 tablet (10 mg total) by mouth once daily. 28 tablet 0    FEROSUL 325 mg (65 mg iron) Tab tablet Take 1 tablet by mouth 3 (three) times daily.      gas permeable  (OXYGEN PERMEABLE  MISC) Oxygen      magnesium oxide (MAG-OX) 400 mg (241.3 mg magnesium) tablet Take 1 tablet by mouth every morning.      meclizine (ANTIVERT) 25 mg tablet Take 1 tablet (25 mg total) by mouth 3 (three) times daily as needed for Dizziness. 270 tablet 1    memantine (NAMENDA) 10 MG Tab Take 10 mg by mouth once daily.      mirtazapine (REMERON) 15 MG tablet Take 1 tablet by mouth every evening.      omeprazole (PRILOSEC) 40 MG capsule Take 40 mg by mouth every morning.      ondansetron (ZOFRAN-ODT) 4 MG TbDL Take 1 tablet (4 mg total) by mouth every 8 (eight) hours as needed (Nausea.). 120 tablet 3    sacubitriL-valsartan (ENTRESTO) 24-26 mg per tablet Take 1 tablet by mouth 2 (two) times daily. 60 tablet 5    sacubitriL-valsartan (ENTRESTO) 24-26 mg per tablet Take 1 tablet by mouth 2 (two) times daily. 112 tablet 0    sertraline (ZOLOFT) 50 MG tablet Take 1 tablet (50 mg total) by mouth once daily. For MOOD 90 tablet 1    spironolactone (ALDACTONE) 50 MG tablet Take 1 tablet (50 mg total) by mouth Daily. 90 tablet 3    temazepam (RESTORIL) 7.5 MG Cap TAKE 1 CAPSULE BY MOUTH AT BEDTIME FOR INSOMNIA AS NEEDED      [DISCONTINUED] cyanocobalamin 1,000 mcg/mL injection Inject 1 mL (1,000 mcg total) into the muscle every 30 days. 1 mL 5    [DISCONTINUED] dapagliflozin propanediol (FARXIGA) 10 mg tablet Take 1 tablet (10 mg total) by mouth once daily. 30 tablet 5       Allergies  Review of patient's allergies indicates:   Allergen Reactions    Hay fever and allergy relief Rash    Pollen extracts Rash       Physical Examination      Vitals:    10/17/24 1244   BP: 111/62   Pulse: 76   Resp: 16   Temp: 97.1 °F (36.2 °C)     Physical Exam  Vitals and nursing note reviewed.   Constitutional:       General: He is not in acute distress.     Appearance: Normal appearance. He is not ill-appearing.   Eyes:      Extraocular Movements: Extraocular movements intact.      Right eye: Normal extraocular motion and no nystagmus.      Left eye: Normal extraocular motion and no nystagmus.      Conjunctiva/sclera:      Right eye: Right conjunctiva is not injected.      Left eye: Left conjunctiva is not injected.      Pupils: Pupils are equal, round, and reactive to light.   Cardiovascular:      Rate and Rhythm: Normal rate and regular rhythm.   Pulmonary:      Effort: Pulmonary effort is normal.      Breath sounds: Normal breath sounds.   Skin:     General: Skin is warm.      Findings: No rash.   Neurological:      General: No focal deficit present.      Mental Status: He is alert and oriented to person, place, and time. Mental status is at baseline.      Sensory: Sensation is intact.      Motor: No weakness.   Psychiatric:         Mood and Affect: Mood normal.         Behavior: Behavior normal.            Assessment and Plan (including Health Maintenance)      Problem List  Smart Sets  Document Outside HM   :    Plan:     MRI brain to see if he had another small stroke, this time affecting his balance center of the brain     Health Maintenance Due   Topic Date Due    Shingles Vaccine (1 of 2) Never done    TETANUS VACCINE  09/17/2012    RSV Vaccine (Age 60+ and Pregnant patients) (1 - 1-dose 75+ series) Never done    COVID-19 Vaccine (3 - Moderna risk series) 12/20/2021    Lipid Panel  09/03/2024       Problem List Items Addressed This Visit          Neuro    Dementia with behavioral disturbance - Primary (Chronic)       Oncology    Malignant neoplasm of pancreas (Chronic)    Overview     Whipple procedure performed at [facility] in [place], [place]. By   Juancho Yan. Early 2023             Endocrine    B12 deficiency (Chronic)    Relevant Medications    cyanocobalamin 1,000 mcg/mL injection     Other Visit Diagnoses       Transient cerebral ischemia, unspecified type        Relevant Orders    MRI Brain W WO Contrast    Creatinine, serum    Stroke-like symptoms        Relevant Orders    MRI Brain W WO Contrast    Creatinine, serum            Health Maintenance Topics with due status: Not Due       Topic Last Completion Date    Colonoscopy 05/23/2024       Procedures     Future Appointments   Date Time Provider Department Center   11/14/2024  2:40 PM Shahrzad Reynaga NP [facility]        Follow up if symptoms worsen or fail to improve.     Signature:  Raymon Tyler MD  [address] [place] [address]  Phone #: [phone]  Fax #: [phone]    Date of encounter: 10/17/24    There are no Patient Instructions on file for this visit.

## 2024-10-23 ENCOUNTER — TELEPHONE (OUTPATIENT)
Dept: CARDIOLOGY | Facility: CLINIC | Age: 79
End: 2024-10-23
Payer: MEDICARE

## 2024-10-23 NOTE — TELEPHONE ENCOUNTER
----- Message from Annette sent at 10/23/2024 11:53 AM CDT -----  Who Called: Taylor     Caller is requesting assistance/information from provider's office.    Taylor is wanting to know if it is okay for pt to get a MRI on brain done at [facility] before setting appt . Wants to follow up joel QUIÑONEZ       Preferred Method of Contact: Phone Call  Patient's Preferred Phone Number on File: [phone]   Best Call Back Number, if different:  Additional Information:    Reviewed ICD chart with Dulce Maria.  His ICD and leads are MRI compatible. Notified wife.

## 2024-10-30 NOTE — TELEPHONE ENCOUNTER
----- Message from Elizabeth sent at 10/30/2024  2:52 PM CDT -----  Regarding: medication  Who Called: Tiny Gutierrez    Caller is requesting assistance/information from provider's office.    Symptoms (please be specific): wants to know if they can  medication tomorrow         Preferred Method of Contact: Phone Call  Patient's Preferred Phone Number on File: [phone]   Best Call Back Number, if different:  Additional Information:

## 2024-11-04 RX ORDER — SACUBITRIL AND VALSARTAN 24; 26 MG/1; MG/1
1 TABLET, FILM COATED ORAL 2 TIMES DAILY
Qty: 112 TABLET | Refills: 0 | COMMUNITY
Start: 2024-11-04 | End: 2024-11-05 | Stop reason: SDUPTHER

## 2024-11-04 RX ORDER — DAPAGLIFLOZIN 10 MG/1
10 TABLET, FILM COATED ORAL DAILY
Qty: 28 TABLET | Refills: 0 | COMMUNITY
Start: 2024-11-04

## 2024-11-05 ENCOUNTER — OFFICE VISIT (OUTPATIENT)
Dept: FAMILY MEDICINE | Facility: CLINIC | Age: 79
End: 2024-11-05
Payer: MEDICARE

## 2024-11-05 VITALS
HEART RATE: 59 BPM | OXYGEN SATURATION: 97 % | TEMPERATURE: 99 F | SYSTOLIC BLOOD PRESSURE: 100 MMHG | BODY MASS INDEX: 27.43 KG/M2 | HEIGHT: 73 IN | WEIGHT: 207 LBS | RESPIRATION RATE: 18 BRPM | DIASTOLIC BLOOD PRESSURE: 61 MMHG

## 2024-11-05 DIAGNOSIS — L23.89 ALLERGIC CONTACT DERMATITIS DUE TO OTHER AGENTS: Primary | ICD-10-CM

## 2024-11-05 PROCEDURE — 96372 THER/PROPH/DIAG INJ SC/IM: CPT | Mod: ,,, | Performed by: NURSE PRACTITIONER

## 2024-11-05 PROCEDURE — 99214 OFFICE O/P EST MOD 30 MIN: CPT | Mod: ,,, | Performed by: NURSE PRACTITIONER

## 2024-11-05 RX ORDER — METHOCARBAMOL 500 MG/1
500 TABLET, FILM COATED ORAL 4 TIMES DAILY PRN
COMMUNITY
Start: 2024-07-11

## 2024-11-05 RX ORDER — PREDNISONE 20 MG/1
40 TABLET ORAL DAILY
Qty: 10 TABLET | Refills: 0 | Status: SHIPPED | OUTPATIENT
Start: 2024-11-05

## 2024-11-05 RX ORDER — DEXAMETHASONE SODIUM PHOSPHATE 4 MG/ML
8 INJECTION, SOLUTION INTRA-ARTICULAR; INTRALESIONAL; INTRAMUSCULAR; INTRAVENOUS; SOFT TISSUE
Status: COMPLETED | OUTPATIENT
Start: 2024-11-05 | End: 2024-11-05

## 2024-11-05 RX ORDER — APIXABAN 5 MG/1
2.5 TABLET, FILM COATED ORAL 2 TIMES DAILY
COMMUNITY
Start: 2024-09-13

## 2024-11-05 RX ADMIN — DEXAMETHASONE SODIUM PHOSPHATE 8 MG: 4 INJECTION, SOLUTION INTRA-ARTICULAR; INTRALESIONAL; INTRAMUSCULAR; INTRAVENOUS; SOFT TISSUE at 02:11

## 2024-11-05 NOTE — PROGRESS NOTES
Jackie Rojas DNP, ROB    [address] [place] [address]     PATIENT NAME: Tiny Gutierrez  : 1945  DATE: 24  MRN: [id number]      Billing Provider: Jackie Rojas DNP, FNP  Level of Service: [facility] OFFICE/OUTPT VISIT, EST, LEVL IV, 30-39 MIN  Patient PCP Information       Provider PCP Type    Raymon Tyler MD General            Reason for Visit / Chief Complaint: Rash (Pt has redness, itching, and burning on B forearms with unknown cause. Denies yardwork.  It has been present since  and remedies at home have not helped. )       Update PCP  Update Chief Complaint         History of Present Illness / Problem Focused Workflow     Tiny Gutierrez presents to the clinic with Rash (Pt has redness, itching, and burning on B forearms with unknown cause. Denies yardwork.  It has been present since  and remedies at home have not helped. )     Rash  Pertinent negatives include no congestion, cough, diarrhea, fatigue, fever, shortness of breath, sore throat or vomiting.       Review of Systems     Review of Systems   Constitutional:  Negative for activity change, appetite change, chills, fatigue and fever.   HENT:  Negative for nasal congestion, ear pain, hearing loss, postnasal drip and sore throat.    Respiratory:  Negative for cough, chest tightness, shortness of breath and wheezing.    Cardiovascular:  Negative for chest pain, palpitations, leg swelling and claudication.   Gastrointestinal:  Negative for abdominal pain, change in bowel habit, constipation, diarrhea, nausea and vomiting.   Genitourinary:  Negative for dysuria.   Musculoskeletal:  Negative for arthralgias, back pain, gait problem and myalgias.   Integumentary:  Positive for rash.   Neurological:  Negative for weakness and headaches.   Psychiatric/Behavioral:  Negative for suicidal ideas. The patient is not nervous/anxious.         Medical / Social / Family History     Past Medical  History:   Diagnosis Date    Aortic heart murmur     Asthma     Cancer     Deep vein thrombosis     Emphysema/COPD     Hyperlipidemia     Ischemic cardiomyopathy     Mitral regurgitation     Old myocardial infarction 03/2001    Tricuspid regurgitation     Vitamin B 12 deficiency     Vitamin D deficiency        Past Surgical History:   Procedure Laterality Date    CARDIAC CATHETERIZATION      CARDIAC DEFIBRILLATOR PLACEMENT      CHOLECYSTECTOMY      CORONARY ARTERY BYPASS GRAFT      TOTAL KNEE ARTHROPLASTY Bilateral        Social History  Mr. Tiny Gutierrez  reports that he has never smoked. He has never been exposed to tobacco smoke. He has never used smokeless tobacco. He reports that he does not drink alcohol and does not use drugs.    Family History  Mr. Tiny Gutierrez's family history includes Emphysema in his father.    Medications and Allergies     Medications  Outpatient Medications Marked as Taking for the 11/5/24 encounter (Office Visit) with Jackie Rojas, TANIKA, FNP   Medication Sig Dispense Refill    albuterol (PROVENTIL) 2.5 mg /3 mL (0.083 %) nebulizer solution Take 3 mLs (2.5 mg total) by nebulization as needed for Wheezing or Shortness of Breath. 300 mL 5    albuterol (PROVENTIL/VENTOLIN HFA) 90 mcg/actuation inhaler 2 puffs as needed      atorvastatin (LIPITOR) 40 MG tablet Take 1 tablet (40 mg total) by mouth every evening. For CHOLESTEROL 90 tablet 1    BREO ELLIPTA 200-25 mcg/dose DsDv diskus inhaler 1 puff once daily.      calcium carbonate/vitamin D3 (CALTRATE 600 + D ORAL) Take 1 capsule by mouth Daily.      CREON 24,000-76,000 -120,000 unit capsule Take 1 capsule by mouth 3 (three) times daily.      cyanocobalamin 1,000 mcg/mL injection Inject 1 mL (1,000 mcg total) into the muscle every 30 days. 10 mL 5    dapagliflozin propanediol (FARXIGA) 10 mg tablet Take 1 tablet (10 mg total) by mouth once daily. 28 tablet 0    ELIQUIS 5 mg Tab Take 2.5 mg by mouth 2 (two) times daily.       FEROSUL 325 mg (65 mg iron) Tab tablet Take 1 tablet by mouth 3 (three) times daily.      gas permeable  (OXYGEN PERMEABLE  MISC) Oxygen      LORazepam (ATIVAN) 0.5 MG tablet Take 1 tablet (0.5 mg total) by mouth every 12 (twelve) hours as needed (for ANXIETY and AGITATION). 30 tablet 1    magnesium oxide (MAG-OX) 400 mg (241.3 mg magnesium) tablet Take 1 tablet by mouth every morning.      meclizine (ANTIVERT) 25 mg tablet Take 1 tablet (25 mg total) by mouth 3 (three) times daily as needed for Dizziness. 270 tablet 1    memantine (NAMENDA) 10 MG Tab Take 10 mg by mouth once daily.      methocarbamoL (ROBAXIN) 500 MG Tab Take 500 mg by mouth 4 (four) times daily as needed.      omeprazole (PRILOSEC) 40 MG capsule Take 40 mg by mouth every morning.      ondansetron (ZOFRAN-ODT) 4 MG TbDL Take 1 tablet (4 mg total) by mouth every 8 (eight) hours as needed (Nausea.). 120 tablet 3    sacubitriL-valsartan (ENTRESTO) 24-26 mg per tablet Take 1 tablet by mouth 2 (two) times daily. (Patient taking differently: Take 1 tablet by mouth once daily.) 60 tablet 5    sertraline (ZOLOFT) 50 MG tablet Take 1 tablet (50 mg total) by mouth once daily. For MOOD 90 tablet 1    spironolactone (ALDACTONE) 50 MG tablet Take 1 tablet (50 mg total) by mouth Daily. 90 tablet 3     Current Facility-Administered Medications for the 11/5/24 encounter (Office Visit) with Jackie Rojas DNP, FNP   Medication Dose Route Frequency Provider Last Rate Last Admin    [COMPLETED] dexAMETHasone injection 8 mg  8 mg Intramuscular 1 time in Clinic/HOD Jackie Rojas DNP, FNP   8 mg at 11/05/24 9467       Allergies  Review of patient's allergies indicates:   Allergen Reactions    Pollen extracts Rash     Allergy to hay       Physical Examination     Vitals:    11/05/24 1431   BP: 100/61   BP Location: Left arm   Patient Position: Sitting   Pulse: (!) 59   Resp: 18   Temp: 98.5 °F (36.9 °C)   TempSrc: Oral   SpO2: 97%   Weight: 93.9  "kg (207 lb)   Height: 6' 1" (1.854 m)     Physical Exam  Vitals and nursing note reviewed.   Constitutional:       General: He is not in acute distress.     Appearance: Normal appearance. He is normal weight.   HENT:      Mouth/Throat:      Mouth: Mucous membranes are moist.   Eyes:      Pupils: Pupils are equal, round, and reactive to light.   Cardiovascular:      Rate and Rhythm: Normal rate and regular rhythm.      Pulses: Normal pulses.      Heart sounds: No murmur heard.  Pulmonary:      Effort: Pulmonary effort is normal. No respiratory distress.      Breath sounds: Normal breath sounds. No wheezing, rhonchi or rales.   Chest:      Chest wall: No tenderness.   Abdominal:      General: Bowel sounds are normal. There is no distension.      Palpations: Abdomen is soft.      Tenderness: There is no abdominal tenderness. There is no right CVA tenderness, left CVA tenderness, guarding or rebound.   Musculoskeletal:         General: No swelling or tenderness. Normal range of motion.      Cervical back: Normal range of motion and neck supple.      Right lower leg: No edema.      Left lower leg: No edema.   Skin:     General: Skin is warm and dry.      Findings: Rash present. Rash is urticarial.          Neurological:      General: No focal deficit present.      Mental Status: He is alert and oriented to person, place, and time.   Psychiatric:         Mood and Affect: Mood normal.         Behavior: Behavior normal.         Thought Content: Thought content normal.         Judgment: Judgment normal.          Assessment and Plan (including Health Maintenance)      Problem List  Smart Sets  Document Outside HM   :    Plan:         Health Maintenance Due   Topic Date Due    Shingles Vaccine (1 of 2) Never done    TETANUS VACCINE  09/17/2012    RSV Vaccine (Age 60+ and Pregnant patients) (1 - 1-dose 75+ series) Never done    COVID-19 Vaccine (3 - Moderna risk series) 12/20/2021    Lipid Panel  09/03/2024       Problem List " Items Addressed This Visit    None  Visit Diagnoses       Allergic contact dermatitis due to other agents    -  Primary    Relevant Medications    dexAMETHasone injection 8 mg (Completed) (Start on 11/5/2024  3:00 PM)    predniSONE (DELTASONE) 20 MG tablet          Allergic contact dermatitis due to other agents  -     dexAMETHasone injection 8 mg  -     predniSONE (DELTASONE) 20 MG tablet; Take 2 tablets (40 mg total) by mouth once daily.  Dispense: 10 tablet; Refill: 0       Health Maintenance Topics with due status: Not Due       Topic Last Completion Date    Colonoscopy 05/23/2024           Future Appointments   Date Time Provider Department Center   11/5/2024  3:00 PM Jackie Rojas DNP, ROB [facility] [place]   11/14/2024  2:40 PM Shahrzad Reynaga, NP [facility]        No follow-ups on file.     Signature:  Jackie Rojas DNP, FNP  [address] [place] [address]  Phone #: [phone]  Fax #: [phone]    Date of encounter: 11/5/24    Patient Instructions   Cool compresses to bilateral arms. Start prednisone 2 tablets by mouth every morning on 11/6/2024. Take zyrtec (cetrizine) 10 mg at bedtime for 1 week. Good moisturizer. Avoid alcohol.

## 2024-11-05 NOTE — PATIENT INSTRUCTIONS
Cool compresses to bilateral arms. Start prednisone 2 tablets by mouth every morning on 11/6/2024. Take zyrtec (cetrizine) 10 mg at bedtime for 1 week. Good moisturizer. Avoid alcohol.

## 2024-11-08 DIAGNOSIS — B37.9 YEAST INFECTION: Primary | ICD-10-CM

## 2024-11-08 RX ORDER — FLUCONAZOLE 100 MG/1
100 TABLET ORAL DAILY
Qty: 2 TABLET | Refills: 0 | Status: SHIPPED | OUTPATIENT
Start: 2024-11-08 | End: 2024-12-08

## 2024-11-08 NOTE — TELEPHONE ENCOUNTER
She states he has a rash on his Buttocks area that goes around to his genital area. He is on prednisone tapering dose for a rash on his arms. Rash on his arms is better but new rash has started this morning. Dr. Tyler suggested Diflucan for 2 doses.

## 2024-11-08 NOTE — TELEPHONE ENCOUNTER
"----- Message from Raymon Tyler MD sent at 11/8/2024  9:44 AM CST -----  I do not see a cream in the medical record or on his medicine list.  ----- Message -----  From: Gladys Hayden LPN  Sent: 11/8/2024   9:31 AM CST  To: Raymon Tyler MD      ----- Message -----  From: Liz Herrera  Sent: 11/8/2024   9:26 AM CST  To: Jayson BARNETT Staff    Tiny Gutierrez [MRN: [id number]] deels that he maybe elegiac to a "new cream". Pt phne: [phone]. He was seen by Bob on 11/05.  "

## 2024-11-14 ENCOUNTER — OFFICE VISIT (OUTPATIENT)
Dept: GASTROENTEROLOGY | Facility: CLINIC | Age: 79
End: 2024-11-14
Payer: MEDICARE

## 2024-11-14 VITALS
BODY MASS INDEX: 27.41 KG/M2 | HEIGHT: 73 IN | WEIGHT: 206.81 LBS | SYSTOLIC BLOOD PRESSURE: 111 MMHG | DIASTOLIC BLOOD PRESSURE: 64 MMHG | OXYGEN SATURATION: 98 % | HEART RATE: 71 BPM

## 2024-11-14 DIAGNOSIS — R18.8 CIRRHOSIS OF LIVER WITH ASCITES, UNSPECIFIED HEPATIC CIRRHOSIS TYPE: Primary | Chronic | ICD-10-CM

## 2024-11-14 DIAGNOSIS — K74.60 CIRRHOSIS OF LIVER WITH ASCITES, UNSPECIFIED HEPATIC CIRRHOSIS TYPE: Primary | Chronic | ICD-10-CM

## 2024-11-14 PROCEDURE — 99999 PR PBB SHADOW E&M-EST. PATIENT-LVL V: CPT | Mod: PBBFAC,,,

## 2024-11-14 PROCEDURE — 99214 OFFICE O/P EST MOD 30 MIN: CPT | Mod: S$PBB,,,

## 2024-11-14 PROCEDURE — 85610 PROTHROMBIN TIME: CPT

## 2024-11-14 PROCEDURE — 99215 OFFICE O/P EST HI 40 MIN: CPT | Mod: PBBFAC

## 2024-11-14 NOTE — PROGRESS NOTES
CC:  Follow-up cirrhosis, abdominal hernias    HPI 79 y.o. white male presents today with a follow-up on cirrhosis.  Patient states he is feeling better, his color is better has not had any lower extremity or abdominal swelling.  Patient states Dr. Hughes placed him on Farxiga and Entresto this has helped to swelling immensely.  Patient patient states that he now has 2 hernias next to his incision on his abdomen.  Patient states they do not hurt they do not cause him pain.  Instructed patient to obtain an abdominal binder and wear this daily to help support his abdominal wall.  Instructed patient and wife that if he begins to pain redness swelling tenderness to his abdomen he needs to get straight back to the emergency room.  Patient states he has been having some dizziness he is scheduled for an MRI of his brain for his neurologist.  Patient denies any hematochezia or melena.  Denies any nausea vomiting or dysphagia at  Instructed patient and wife if he begins to have more swelling in his abdomen, swelling in his lower extremities, shortness of breath he will need to get back to the emergency room.  Instructed patient if he notices bruising all of sudden or the whites of his eyes and skin become yellow hue he will need to get back to the emergency room.  Patient and wife verbalized understanding.  Labs ordered today CBC CMP and PT.  Once resulted we will plan to calculate MELD.  Interval  HPI 79 y.o. white male presents today for follow-up 1 month for cirrhosis of the liver with ascites.  Patient looks well today, wife and patient state the swelling in his lower extremities has definitely decreased.  Patient is no longer taking his Lasix 40 mg b.i.d. at this time he is however taking spironolactone 50 mg daily.  Patient does state he has been feeling better, still lacks little energy.  He has been placed on Creon in his only taking it once daily.  States the diarrhea has gotten better since taking the Creon.   "At this point patient is compensated cirrhotic in his MELD score with lab work today is a 7.  Patient has next appointment with his oncologist in September and will have a CT at that time and then he will follow-up here in October.  Instructed patient and wife if he begins to have more swelling in his abdomen, swelling in his lower extremities, shortness of breath he will need to get back to the emergency room.  Instructed patient if he notices bruising all of sudden or the whites of his eyes and skin become yellow hue he will need to get back to the emergency room.  Patient and wife verbalized understanding  Labs reviewed no elevated liver enzymes hemoglobin 9.8 and hematocrit 31.1 platelet 133.  Medical records reviewed. Additional history supplemented by nursing.     Past Medical History:   Diagnosis Date    Aortic heart murmur     Asthma     Cancer     Deep vein thrombosis     Emphysema/COPD     Hyperlipidemia     Ischemic cardiomyopathy     Mitral regurgitation     Old myocardial infarction 03/2001    Tricuspid regurgitation     Vitamin B 12 deficiency     Vitamin D deficiency          Review of Systems  General ROS: negative for chills, fever or weight loss  Cardiovascular ROS: no chest pain or dyspnea on exertion  Gastrointestinal ROS: no abdominal pain, change in bowel habits, or black/ bloody stools    Physical Examination  /64   Pulse 71   Ht 6' 1" (1.854 m)   Wt 93.8 kg (206 lb 12.8 oz)   SpO2 98%   BMI 27.28 kg/m²   General appearance: alert, cooperative, no distress  HENT: Normocephalic, atraumatic, neck symmetrical, no nasal discharge   Lungs: no labored breathing, symmetric chest wall expansion bilaterally  Heart: regular rate and rhythm without rub; no displacement of the PMI   Abdomen: soft, non-tender; bowel sounds normoactive; no organomegaly  Extremities: extremities symmetric; no clubbing, cyanosis, or edema  Neurologic: Alert and oriented X 3, normal strength, normal coordination and " gait    Labs:  Lab Results   Component Value Date    WBC 5.49 07/16/2024    HGB 10.7 (L) 07/16/2024    HCT 33.9 (L) 07/16/2024    MCV 98.8 (H) 07/16/2024     (L) 07/16/2024       CMP  Sodium   Date Value Ref Range Status   07/16/2024 142 136 - 145 mmol/L Final   04/19/2024 141 136 - 145 mmol/L Final     Potassium   Date Value Ref Range Status   07/16/2024 4.2 3.5 - 5.1 mmol/L Final   04/19/2024 3.4 (L) 3.5 - 5.1 mmol/L Final     Comment:     slight hemolysis     Chloride   Date Value Ref Range Status   07/16/2024 109 (H) 98 - 107 mmol/L Final   04/19/2024 106 100 - 109 mmol/L Final     CO2   Date Value Ref Range Status   07/16/2024 31 21 - 32 mmol/L Final     Carbon Dioxide   Date Value Ref Range Status   04/19/2024 27 22 - 33 mmol/L Final     Glucose   Date Value Ref Range Status   07/16/2024 124 (H) 74 - 106 mg/dL Final     BUN   Date Value Ref Range Status   07/16/2024 21 (H) 7 - 18 mg/dL Final     Blood Urea Nitrogen   Date Value Ref Range Status   04/19/2024 18 5 - 25 mg/dL Final     Creatinine   Date Value Ref Range Status   10/17/2024 1.06 0.70 - 1.30 mg/dL Final   04/19/2024 0.99 0.73 - 1.18 mg/dL Final     Calcium   Date Value Ref Range Status   07/16/2024 9.0 8.5 - 10.1 mg/dL Final   04/19/2024 8.8 8.8 - 10.6 mg/dL Final     Total Protein   Date Value Ref Range Status   07/16/2024 6.7 6.4 - 8.2 g/dL Final     Albumin   Date Value Ref Range Status   07/16/2024 3.1 (L) 3.5 - 5.0 g/dL Final     Bilirubin, Total   Date Value Ref Range Status   07/16/2024 0.5 >0.0 - 1.2 mg/dL Final     Alk Phos   Date Value Ref Range Status   07/16/2024 101 45 - 115 U/L Final     AST   Date Value Ref Range Status   07/16/2024 27 15 - 37 U/L Final     ALT   Date Value Ref Range Status   07/16/2024 23 16 - 61 U/L Final     Anion Gap   Date Value Ref Range Status   07/16/2024 6 (L) 7 - 16 mmol/L Final   04/19/2024 8 8 - 16 mmol/L Final     eGFR    Date Value Ref Range Status   04/19/2024 77 mL/min/1.73mSq  Final     Comment:     In accordance with NKF-ASN Task Force recommendation, calculation based on the Chronic Kidney Disease Epidemiology Collaboration (CKD-EPI) equation without adjustment for race. eGFR adjusted for gender and age and calculated in ml/min/1.73mSquared. eGFR cannot be calculated if patient is under 18 years of age.     Reference Range:   >= 60 ml/min/1.73mSquared.     eGFR   Date Value Ref Range Status   10/27/2021 67 >=60 mL/min/1.73m² Final       Imaging:  CT Chest Abdomen Pelvis With IV Contrast (XPD)  Order: [id number]  Impression    IMPRESSION:  No acute pathology within the chest, abdomen or pelvis. No evidence of  residual neoplasm or metastatic disease.      This CT exam was performed using one or more of the following dose  reduction techniques: Automated exposure control, adjustment of mA  and/or KV according to patient size, and/or use of iterative  reconstruction technique. Patient has had 3 prior CT scans and/or  nuclear cardiac studies in the last year.  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 There is no mediastinal, hilar or axillary  adenopathy. No pleural or pericardial fluid is seen. There are  scattered benign calcified granulomas. There is no infiltrate or  concerning mass.    Within the abdomen, there are similar postsurgical changes centered  along the asuncion hepatis. There are no findings of residual or  recurrent neoplasm. There is no pathologic adenopathy. Spleen,  pancreas, bilateral adrenal glands and kidneys have a normal CT  appearance.    There is mild fecal retention. There are scattered colonic diverticula  but no active inflammatory process. There is no evidence of  obstruction, free air or free fluid. Urinary bladder is normal. No  significant  osseous abnormality is present.    Independently reviewed    Assessment: Tiny Gutierrez 80 yo WM here with:  Cirrhosis of the liver  Pancreatic cancer  Prostate cancer    Plan:  Labs today CBC, CMP and PT we will plan a MELD score once labs are resulted.  Patient is scheduled a CT of the abdomen at [facility] in December keep that appointment with his oncologist  Follow-up in 3 months we will plan repeat imaging and labs.    30 minutes of total time spent on the encounter, which includes face to face time and non-face to face time preparing to see the patient (eg, review of tests), obtaining and/or reviewing separately obtained history, documenting clinical information in the electronic or other health record, Independently interpreting results (not separately reported) and communicating results to the patient/family/caregiver, or care coordination (not separately reported).         Shahrzad Reynaga, FNP  [facility]

## 2024-11-14 NOTE — PATIENT INSTRUCTIONS
Cirrhosis Counseling  -- Strict abstinence from alcohol  -- Avoid non-steroidal anti-inflammatory drugs (NSAIDs) such as ibuprofen (Motrin, Advil), naproxen (Naprosyn, Aleve)  -- Tylenol/acetaminophen as needed for pain, no more than 2000 mg per day  -- No raw shellfish due to the risk of Vibrio vulnificus infection  -- Low sodium diet, less than 2000 mg per day   -- High protein diet to prevent muscle mass loss. Can drink protein shakes  -- Upper endoscopy every 1-3 years to screen for varices   -- Ultrasound of the liver every 6 months for HCC screening

## 2024-11-15 ENCOUNTER — TELEPHONE (OUTPATIENT)
Dept: GASTROENTEROLOGY | Facility: CLINIC | Age: 79
End: 2024-11-15
Payer: MEDICARE

## 2024-11-15 NOTE — TELEPHONE ENCOUNTER
----- Message from Shahrzad Reynaga NP sent at 11/15/2024 12:12 PM CST -----  Lab work is stable  -------------------------------------------------------------------------------------------------------------------------------------------------------  MELD 3.0: 9 at 11/14/2024  3:09 PM  MELD-Na: 9 at 11/14/2024  3:09 PM  Calculated from:  Serum Creatinine: 1.11 mg/dL at 11/14/2024  3:09 PM  Serum Sodium: 143 mmol/L (Using max of 137 mmol/L) at 11/14/2024  3:09 PM  Total Bilirubin: 0.8 mg/dL (Using min of 1 mg/dL) at 11/14/2024  3:09 PM  Serum Albumin: 3.2 g/dL at 11/14/2024  3:09 PM  INR(ratio): 1.18 at 11/14/2024  3:09 PM  Age at listing (hypothetical): 79 years  Sex: Male at 11/14/2024  3:09 PM

## 2024-11-21 ENCOUNTER — HOSPITAL ENCOUNTER (OUTPATIENT)
Dept: CARDIOLOGY | Facility: HOSPITAL | Age: 79
Discharge: HOME | End: 2024-11-21
Attending: INTERNAL MEDICINE
Payer: MEDICARE

## 2024-11-21 DIAGNOSIS — Z95.810 PRESENCE OF DOUBLE CHAMBER AUTOMATIC CARDIOVERTER/DEFIBRILLATOR (AICD): Primary | ICD-10-CM

## 2024-11-21 DIAGNOSIS — Z95.810 PRESENCE OF DOUBLE CHAMBER AUTOMATIC CARDIOVERTER/DEFIBRILLATOR (AICD): ICD-10-CM

## 2024-11-21 PROCEDURE — 93296 REM INTERROG EVL PM/IDS: CPT

## 2024-11-21 PROCEDURE — 93295 DEV INTERROG REMOTE 1/2/MLT: CPT | Mod: ,,, | Performed by: STUDENT IN AN ORGANIZED HEALTH CARE EDUCATION/TRAINING PROGRAM

## 2024-11-22 LAB
OHS CV DC PP MS1: 0.4 MS
OHS CV DC PP MS2: 0.4 MS
OHS CV DC PP V1: 1.75 V
OHS CV DC PP V2: 2.25 V

## 2024-11-26 ENCOUNTER — OFFICE VISIT (OUTPATIENT)
Dept: FAMILY MEDICINE | Facility: CLINIC | Age: 79
End: 2024-11-26
Payer: MEDICARE

## 2024-11-26 VITALS
SYSTOLIC BLOOD PRESSURE: 122 MMHG | RESPIRATION RATE: 18 BRPM | HEIGHT: 73 IN | TEMPERATURE: 97 F | WEIGHT: 204 LBS | DIASTOLIC BLOOD PRESSURE: 77 MMHG | BODY MASS INDEX: 27.04 KG/M2 | HEART RATE: 76 BPM | OXYGEN SATURATION: 97 %

## 2024-11-26 DIAGNOSIS — L27.0 DRUG ERUPTION: Primary | Chronic | ICD-10-CM

## 2024-11-26 DIAGNOSIS — B37.9 YEAST INFECTION: ICD-10-CM

## 2024-11-26 DIAGNOSIS — R21 MORBILLIFORM RASH: ICD-10-CM

## 2024-11-26 PROCEDURE — 96372 THER/PROPH/DIAG INJ SC/IM: CPT | Mod: ,,, | Performed by: FAMILY MEDICINE

## 2024-11-26 PROCEDURE — 99213 OFFICE O/P EST LOW 20 MIN: CPT | Mod: ,,, | Performed by: FAMILY MEDICINE

## 2024-11-26 RX ORDER — PREDNISONE 20 MG/1
20 TABLET ORAL 2 TIMES DAILY PRN
Qty: 30 TABLET | Refills: 2 | Status: SHIPPED | OUTPATIENT
Start: 2024-11-26

## 2024-11-26 RX ORDER — DEXAMETHASONE SODIUM PHOSPHATE 4 MG/ML
6 INJECTION, SOLUTION INTRA-ARTICULAR; INTRALESIONAL; INTRAMUSCULAR; INTRAVENOUS; SOFT TISSUE
Status: COMPLETED | OUTPATIENT
Start: 2024-11-26 | End: 2024-11-26

## 2024-11-26 RX ORDER — BETAMETHASONE DIPROPIONATE 0.5 MG/G
CREAM TOPICAL
Qty: 90 G | Refills: 1 | Status: SHIPPED | OUTPATIENT
Start: 2024-11-26

## 2024-11-26 RX ORDER — FLUCONAZOLE 100 MG/1
100 TABLET ORAL DAILY PRN
Qty: 5 TABLET | Refills: 0 | Status: SHIPPED | OUTPATIENT
Start: 2024-11-26 | End: 2024-12-26

## 2024-11-26 RX ADMIN — DEXAMETHASONE SODIUM PHOSPHATE 6 MG: 4 INJECTION, SOLUTION INTRA-ARTICULAR; INTRALESIONAL; INTRAMUSCULAR; INTRAVENOUS; SOFT TISSUE at 01:11

## 2024-11-26 NOTE — PROGRESS NOTES
Raymon Tyler MD    [address] [place] [address]     PATIENT NAME: Tiny Gutierrez  : 1945  DATE: 24  MRN: [id number]      Billing Provider: Raymon Tyler MD  Level of Service: [place] OFFICE/OUTPT VISIT, EST, LEVL III, 20-29 MIN  Patient PCP Information       Provider PCP Type    Raymon Tyler MD General            Reason for Visit / Chief Complaint: Rash (States he has a rash that started  night. States it is itching. States Friday night he had fever of 101. Saturday he passed out and was shaking so went to [facility]. They treated him for dehydration. Records requested)       Update PCP  Update Chief Complaint         History of Present Illness / Problem Focused Workflow     Tiny Gutierrez presents to the clinic with Rash (States he has a rash that started  night. States it is itching. States Friday night he had fever of 101. Saturday he passed out and was shaking so went to [facility]. They treated him for dehydration. Records requested)     He was taken to the ER 3 days ago due to passing out, they gave him some fluids to rehydrate him, no medications were given. He does have     He is on creon for pancreatic enzymes since his pancreatic surgery, that dose has been adjusted several times.       HPI    Review of Systems     Review of Systems   Constitutional:  Positive for fatigue. Negative for activity change, appetite change, chills and fever.   HENT:  Negative for nasal congestion, ear pain, hearing loss, postnasal drip and sore throat.    Respiratory:  Negative for cough, chest tightness, shortness of breath and wheezing.    Cardiovascular:  Negative for chest pain, palpitations, leg swelling and claudication.   Gastrointestinal:  Negative for abdominal pain, change in bowel habit, constipation, diarrhea, nausea and vomiting.   Genitourinary:  Negative for dysuria.   Musculoskeletal:  Negative for arthralgias,  back pain, gait problem and myalgias.   Integumentary:  Positive for rash.   Neurological:  Negative for weakness and headaches.   Psychiatric/Behavioral:  Negative for suicidal ideas. The patient is not nervous/anxious.         Medical / Social / Family History     Past Medical History:   Diagnosis Date    Aortic heart murmur     Asthma     Cancer     Deep vein thrombosis     Emphysema/COPD     Hyperlipidemia     Ischemic cardiomyopathy     Mitral regurgitation     Old myocardial infarction 03/2001    Tricuspid regurgitation     Vitamin B 12 deficiency     Vitamin D deficiency        Past Surgical History:   Procedure Laterality Date    CARDIAC CATHETERIZATION      CARDIAC DEFIBRILLATOR PLACEMENT      CHOLECYSTECTOMY      CORONARY ARTERY BYPASS GRAFT      TOTAL KNEE ARTHROPLASTY Bilateral        Social History    reports that he has never smoked. He has never been exposed to tobacco smoke. He has never used smokeless tobacco. He reports that he does not drink alcohol and does not use drugs.    Family History  's family history includes Emphysema in his father.    Medications and Allergies     Medications  Outpatient Medications Marked as Taking for the 11/26/24 encounter (Office Visit) with Raymon Tyler MD   Medication Sig Dispense Refill    albuterol (PROVENTIL) 2.5 mg /3 mL (0.083 %) nebulizer solution Take 3 mLs (2.5 mg total) by nebulization as needed for Wheezing or Shortness of Breath. 300 mL 5    albuterol (PROVENTIL/VENTOLIN HFA) 90 mcg/actuation inhaler 2 puffs as needed      atorvastatin (LIPITOR) 40 MG tablet Take 1 tablet (40 mg total) by mouth every evening. For CHOLESTEROL 90 tablet 1    BREO ELLIPTA 200-25 mcg/dose DsDv diskus inhaler 1 puff once daily.      calcium carbonate/vitamin D3 (CALTRATE 600 + D ORAL) Take 1 capsule by mouth Daily.      CREON 24,000-76,000 -120,000 unit capsule Take 1 capsule by mouth 3 (three) times daily.      cyanocobalamin 1,000 mcg/mL injection Inject 1 mL  (1,000 mcg total) into the muscle every 30 days. 10 mL 5    dapagliflozin propanediol (FARXIGA) 10 mg tablet Take 1 tablet (10 mg total) by mouth once daily. 28 tablet 0    ELIQUIS 5 mg Tab Take 2.5 mg by mouth 2 (two) times daily.      FEROSUL 325 mg (65 mg iron) Tab tablet Take 1 tablet by mouth 3 (three) times daily.      fluconazole (DIFLUCAN) 100 MG tablet Take 1 tablet (100 mg total) by mouth once daily. 2 tablet 0    gas permeable  (OXYGEN PERMEABLE  MISC) Oxygen      magnesium oxide (MAG-OX) 400 mg (241.3 mg magnesium) tablet Take 1 tablet by mouth every morning.      meclizine (ANTIVERT) 25 mg tablet Take 1 tablet (25 mg total) by mouth 3 (three) times daily as needed for Dizziness. 270 tablet 1    memantine (NAMENDA) 10 MG Tab Take 10 mg by mouth once daily.      methocarbamoL (ROBAXIN) 500 MG Tab Take 500 mg by mouth 4 (four) times daily as needed.      omeprazole (PRILOSEC) 40 MG capsule Take 40 mg by mouth every morning.      ondansetron (ZOFRAN-ODT) 4 MG TbDL Take 1 tablet (4 mg total) by mouth every 8 (eight) hours as needed (Nausea.). 120 tablet 3    predniSONE (DELTASONE) 20 MG tablet Take 2 tablets (40 mg total) by mouth once daily. 10 tablet 0    sacubitriL-valsartan (ENTRESTO) 24-26 mg per tablet Take 1 tablet by mouth 2 (two) times daily. 60 tablet 5    sertraline (ZOLOFT) 50 MG tablet Take 1 tablet (50 mg total) by mouth once daily. For MOOD 90 tablet 1    spironolactone (ALDACTONE) 50 MG tablet Take 1 tablet (50 mg total) by mouth Daily. 90 tablet 3       Allergies  Review of patient's allergies indicates:   Allergen Reactions    Pollen extracts Rash     Allergy to hay       Physical Examination     Vitals:    11/26/24 1249   BP: 122/77   Pulse: 76   Resp: 18   Temp: 97.4 °F (36.3 °C)     Physical Exam  Vitals and nursing note reviewed.   Constitutional:       General: He is not in acute distress.     Appearance: Normal appearance. He is not ill-appearing.   Eyes:       Extraocular Movements: Extraocular movements intact.      Pupils: Pupils are equal, round, and reactive to light.   Cardiovascular:      Rate and Rhythm: Normal rate and regular rhythm.   Pulmonary:      Effort: Pulmonary effort is normal.      Breath sounds: Normal breath sounds.   Skin:     General: Skin is warm.      Findings: Rash present.             Comments: Erythema, slight blanching, slightly raised    Neurological:      General: No focal deficit present.      Mental Status: He is alert and oriented to person, place, and time. Mental status is at baseline.   Psychiatric:         Mood and Affect: Mood normal.         Behavior: Behavior normal.            Assessment and Plan (including Health Maintenance)      Problem List  Smart Sets  Document Outside HM   :    Plan:     Treated for rash. He will let me know if symptoms continue     Health Maintenance Due   Topic Date Due    Shingles Vaccine (1 of 2) Never done    TETANUS VACCINE  09/17/2012    RSV Vaccine (Age 60+ and Pregnant patients) (1 - 1-dose 75+ series) Never done    COVID-19 Vaccine (3 - Moderna risk series) 12/20/2021    Lipid Panel  09/03/2024       Problem List Items Addressed This Visit          Derm    Drug eruption - Primary (Chronic)    Relevant Medications    betamethasone dipropionate 0.05 % cream    predniSONE (DELTASONE) 20 MG tablet     Other Visit Diagnoses       Morbilliform rash        Relevant Medications    dexAMETHasone injection 6 mg (Completed)    betamethasone dipropionate 0.05 % cream    predniSONE (DELTASONE) 20 MG tablet    Yeast infection        Relevant Medications    fluconazole (DIFLUCAN) 100 MG tablet            Health Maintenance Topics with due status: Not Due       Topic Last Completion Date    Colonoscopy 05/23/2024       Procedures     Future Appointments   Date Time Provider Department Center   2/14/2025 10:20 AM Shahrzad Reynaga NP [facility]        Follow up if symptoms worsen or fail to improve.      Signature:  Raymon Tyler MD  [address] [place] [address]  Phone #: [phone]  Fax #: [phone]    Date of encounter: 11/26/24    There are no Patient Instructions on file for this visit.

## 2024-11-26 NOTE — LETTER
AUTHORIZATION FOR RELEASE OF   CONFIDENTIAL INFORMATION    Dear Armani Villagomez,    We are seeing Tiny Gutierrez, date of birth 1945, in the clinic at [facility]. Raymon Tyler MD is the patient's PCP. Tiny Gutierrez has an outstanding lab/procedure at the time we reviewed his chart. In order to help keep his health information updated, he has authorized us to request the following medical record(s):        (  )  MAMMOGRAM                                      (  )  COLONOSCOPY      (  )  PAP SMEAR                                          (  )  OUTSIDE LAB RESULTS     (  )  DEXA SCAN                                          (  )  EYE EXAM            (  )  FOOT EXAM                                          (  )  ENTIRE RECORD     (  )  OUTSIDE IMMUNIZATIONS                 ( X )  __latest ER visit_____________         Please fax records to Raymon Tyler MD, [phone]         If you have any questions, please contact Raymon Tyler at [phone].           Patient Name: Tiny Gutierrez  : 1945  Patient Phone #: [phone]

## 2024-11-30 ENCOUNTER — EXTERNAL HOME HEALTH (OUTPATIENT)
Dept: HOME HEALTH SERVICES | Facility: HOSPITAL | Age: 79
End: 2024-11-30
Payer: MEDICARE

## 2024-12-02 PROBLEM — L27.0 DRUG ERUPTION: Chronic | Status: ACTIVE | Noted: 2024-12-02

## 2024-12-03 ENCOUNTER — DOCUMENT SCAN (OUTPATIENT)
Dept: HOME HEALTH SERVICES | Facility: HOSPITAL | Age: 79
End: 2024-12-03
Payer: MEDICARE

## 2024-12-04 DIAGNOSIS — R35.0 URINARY FREQUENCY: Primary | ICD-10-CM

## 2024-12-05 DIAGNOSIS — N30.00 ACUTE CYSTITIS WITHOUT HEMATURIA: Primary | ICD-10-CM

## 2024-12-05 RX ORDER — CEFUROXIME AXETIL 500 MG/1
500 TABLET ORAL 2 TIMES DAILY
Qty: 20 TABLET | Refills: 0 | Status: SHIPPED | OUTPATIENT
Start: 2024-12-05

## 2024-12-05 NOTE — TELEPHONE ENCOUNTER
----- Message from Raymon Tyler MD sent at 12/5/2024  4:36 PM CST -----  Appears to have a UTI. Ceftin 500mg BID for 10 days  ----- Message -----  From: Gladys Hayden LPN  Sent: 12/5/2024   1:52 PM CST  To: Raymon Tyler MD    You have not sent these results to me yet.  ----- Message -----  From: Liz Herrera  Sent: 12/5/2024   1:28 PM CST  To: Jayson BARNETT Staff    Please call Franks Bibsusanna Peckclarissa (MRN: [id number]) back @ [phone] with last Urinalysis results

## 2024-12-18 ENCOUNTER — DOCUMENT SCAN (OUTPATIENT)
Dept: HOME HEALTH SERVICES | Facility: HOSPITAL | Age: 79
End: 2024-12-18
Payer: MEDICARE

## 2024-12-19 RX ORDER — DAPAGLIFLOZIN 10 MG/1
10 TABLET, FILM COATED ORAL DAILY
Qty: 28 TABLET | Refills: 0 | COMMUNITY
Start: 2024-12-19

## 2024-12-19 RX ORDER — SACUBITRIL AND VALSARTAN 24; 26 MG/1; MG/1
1 TABLET, FILM COATED ORAL 2 TIMES DAILY
Qty: 56 TABLET | Refills: 0 | COMMUNITY
Start: 2024-12-19

## 2024-12-20 ENCOUNTER — TELEPHONE (OUTPATIENT)
Dept: FAMILY MEDICINE | Facility: CLINIC | Age: 79
End: 2024-12-20
Payer: MEDICARE

## 2024-12-20 NOTE — TELEPHONE ENCOUNTER
Patient is wanting a MRI on his back done. Oncologist ordered one, [facility] scheduled it for Jan 9 but is wanting to see if we can get one scheduled sooner. Please advise

## 2024-12-20 NOTE — TELEPHONE ENCOUNTER
----- Message from Liz sent at 12/20/2024 11:01 AM CST -----  Brenda Franks (MRN: [id number]) is wanting his MRI to be at [facility] in [place]

## 2025-01-08 ENCOUNTER — DOCUMENT SCAN (OUTPATIENT)
Dept: HOME HEALTH SERVICES | Facility: HOSPITAL | Age: 80
End: 2025-01-08
Payer: MEDICARE

## 2025-01-10 ENCOUNTER — OFFICE VISIT (OUTPATIENT)
Dept: FAMILY MEDICINE | Facility: CLINIC | Age: 80
End: 2025-01-10
Payer: MEDICARE

## 2025-01-10 VITALS
DIASTOLIC BLOOD PRESSURE: 71 MMHG | HEART RATE: 82 BPM | OXYGEN SATURATION: 98 % | HEIGHT: 73 IN | WEIGHT: 193 LBS | RESPIRATION RATE: 18 BRPM | TEMPERATURE: 98 F | BODY MASS INDEX: 25.58 KG/M2 | SYSTOLIC BLOOD PRESSURE: 116 MMHG

## 2025-01-10 DIAGNOSIS — D61.818 PANCYTOPENIA: Chronic | ICD-10-CM

## 2025-01-10 DIAGNOSIS — F01.B2 MODERATE VASCULAR DEMENTIA WITH PSYCHOTIC DISTURBANCE: Chronic | ICD-10-CM

## 2025-01-10 DIAGNOSIS — R18.8 CIRRHOSIS OF LIVER WITH ASCITES, UNSPECIFIED HEPATIC CIRRHOSIS TYPE: Chronic | ICD-10-CM

## 2025-01-10 DIAGNOSIS — I50.22 CHRONIC SYSTOLIC (CONGESTIVE) HEART FAILURE: Chronic | ICD-10-CM

## 2025-01-10 DIAGNOSIS — J44.9 CHRONIC OBSTRUCTIVE PULMONARY DISEASE, UNSPECIFIED COPD TYPE: Chronic | ICD-10-CM

## 2025-01-10 DIAGNOSIS — M54.50 CHRONIC RIGHT-SIDED LOW BACK PAIN WITHOUT SCIATICA: Chronic | ICD-10-CM

## 2025-01-10 DIAGNOSIS — K74.60 CIRRHOSIS OF LIVER WITH ASCITES, UNSPECIFIED HEPATIC CIRRHOSIS TYPE: Chronic | ICD-10-CM

## 2025-01-10 DIAGNOSIS — I48.92 ATRIAL FLUTTER, UNSPECIFIED TYPE: Chronic | ICD-10-CM

## 2025-01-10 DIAGNOSIS — S32.040G COMPRESSION FRACTURE OF FOURTH LUMBAR VERTEBRA WITH DELAYED HEALING: Primary | Chronic | ICD-10-CM

## 2025-01-10 DIAGNOSIS — Z99.81 CHRONIC RESPIRATORY FAILURE WITH HYPOXIA, ON HOME O2 THERAPY: Chronic | ICD-10-CM

## 2025-01-10 DIAGNOSIS — J96.11 CHRONIC RESPIRATORY FAILURE WITH HYPOXIA, ON HOME O2 THERAPY: Chronic | ICD-10-CM

## 2025-01-10 DIAGNOSIS — G89.29 CHRONIC RIGHT-SIDED LOW BACK PAIN WITHOUT SCIATICA: Chronic | ICD-10-CM

## 2025-01-10 PROCEDURE — 99214 OFFICE O/P EST MOD 30 MIN: CPT | Mod: ,,, | Performed by: FAMILY MEDICINE

## 2025-01-10 PROCEDURE — 96372 THER/PROPH/DIAG INJ SC/IM: CPT | Mod: ,,, | Performed by: FAMILY MEDICINE

## 2025-01-10 RX ORDER — OXYCODONE AND ACETAMINOPHEN 5; 325 MG/1; MG/1
1 TABLET ORAL EVERY 6 HOURS PRN
Qty: 28 TABLET | Refills: 0 | Status: SHIPPED | OUTPATIENT
Start: 2025-01-10

## 2025-01-10 RX ORDER — METHYLPREDNISOLONE ACETATE 40 MG/ML
40 INJECTION, SUSPENSION INTRA-ARTICULAR; INTRALESIONAL; INTRAMUSCULAR; SOFT TISSUE
Status: COMPLETED | OUTPATIENT
Start: 2025-01-10 | End: 2025-01-10

## 2025-01-10 RX ORDER — DEXAMETHASONE SODIUM PHOSPHATE 4 MG/ML
4 INJECTION, SOLUTION INTRA-ARTICULAR; INTRALESIONAL; INTRAMUSCULAR; INTRAVENOUS; SOFT TISSUE
Status: COMPLETED | OUTPATIENT
Start: 2025-01-10 | End: 2025-01-10

## 2025-01-10 RX ORDER — KETOROLAC TROMETHAMINE 30 MG/ML
60 INJECTION, SOLUTION INTRAMUSCULAR; INTRAVENOUS
Status: COMPLETED | OUTPATIENT
Start: 2025-01-10 | End: 2025-01-10

## 2025-01-10 RX ADMIN — METHYLPREDNISOLONE ACETATE 40 MG: 40 INJECTION, SUSPENSION INTRA-ARTICULAR; INTRALESIONAL; INTRAMUSCULAR; SOFT TISSUE at 01:01

## 2025-01-10 RX ADMIN — DEXAMETHASONE SODIUM PHOSPHATE 4 MG: 4 INJECTION, SOLUTION INTRA-ARTICULAR; INTRALESIONAL; INTRAMUSCULAR; INTRAVENOUS; SOFT TISSUE at 01:01

## 2025-01-10 RX ADMIN — KETOROLAC TROMETHAMINE 60 MG: 30 INJECTION, SOLUTION INTRAMUSCULAR; INTRAVENOUS at 01:01

## 2025-01-10 NOTE — PROGRESS NOTES
Raymon Tyler MD    [address] [place] [address]     PATIENT NAME: Tiny Gutierrez  : 1945  DATE: 1/10/25  MRN: [id number]      Billing Provider: Raymon Tyler MD  Level of Service: [place] OFFICE/OUTPT VISIT, EST, LEVL IV, 30-39 MIN  Patient PCP Information       Provider PCP Type    Raymon Tyler MD General            Reason for Visit / Chief Complaint: Back Pain (States his back started hurting around  and it has just gotten worse. States he had MRI was yesterday. States laying down makes the pain better. )       Update PCP  Update Chief Complaint         History of Present Illness / Problem Focused Workflow     Tiny Gutierrez presents to the clinic with Back Pain (States his back started hurting around  and it has just gotten worse. States he had MRI was yesterday. States laying down makes the pain better. )     He appears to be in severe pain when I walked in the room, he is laying on the bed unable to get up     He has a couple hernias in his abdomen he believes is from the pancreas surgery     HPI    Review of Systems     Review of Systems   Constitutional:  Negative for activity change, appetite change, chills, fatigue and fever.   HENT:  Negative for nasal congestion, ear pain, hearing loss, postnasal drip and sore throat.    Respiratory:  Negative for cough, chest tightness, shortness of breath and wheezing.    Cardiovascular:  Negative for chest pain, palpitations, leg swelling and claudication.   Gastrointestinal:  Negative for abdominal pain, change in bowel habit, constipation, diarrhea, nausea and vomiting.   Genitourinary:  Negative for dysuria.   Musculoskeletal:  Positive for arthralgias, back pain, gait problem and myalgias.   Integumentary:  Negative for rash.   Neurological:  Negative for weakness and headaches.   Psychiatric/Behavioral:  Negative for suicidal ideas. The patient is not  nervous/anxious.         Medical / Social / Family History     Past Medical History:   Diagnosis Date    Aortic heart murmur     Asthma     Cancer     Deep vein thrombosis     Emphysema/COPD     Hyperlipidemia     Ischemic cardiomyopathy     Mitral regurgitation     Old myocardial infarction 03/2001    Tricuspid regurgitation     Vitamin B 12 deficiency     Vitamin D deficiency        Past Surgical History:   Procedure Laterality Date    CARDIAC CATHETERIZATION      CARDIAC DEFIBRILLATOR PLACEMENT      CHOLECYSTECTOMY      CORONARY ARTERY BYPASS GRAFT      TOTAL KNEE ARTHROPLASTY Bilateral        Social History    reports that he has never smoked. He has never been exposed to tobacco smoke. He has never used smokeless tobacco. He reports that he does not drink alcohol and does not use drugs.    Family History  's family history includes Emphysema in his father.    Medications and Allergies     Medications  Outpatient Medications Marked as Taking for the 1/10/25 encounter (Office Visit) with Raymon Tyler MD   Medication Sig Dispense Refill    albuterol (PROVENTIL) 2.5 mg /3 mL (0.083 %) nebulizer solution Take 3 mLs (2.5 mg total) by nebulization as needed for Wheezing or Shortness of Breath. 300 mL 5    albuterol (PROVENTIL/VENTOLIN HFA) 90 mcg/actuation inhaler 2 puffs as needed      atorvastatin (LIPITOR) 40 MG tablet Take 1 tablet (40 mg total) by mouth every evening. For CHOLESTEROL 90 tablet 1    betamethasone dipropionate 0.05 % cream Apply at thin layer of cream to the back and chest twice daily as needed for itching and RASH 90 g 1    BREO ELLIPTA 200-25 mcg/dose DsDv diskus inhaler 1 puff once daily.      calcium carbonate/vitamin D3 (CALTRATE 600 + D ORAL) Take 1 capsule by mouth Daily.      CREON 24,000-76,000 -120,000 unit capsule Take 1 capsule by mouth 3 (three) times daily.      cyanocobalamin 1,000 mcg/mL injection Inject 1 mL (1,000 mcg total) into the muscle every 30 days. 10 mL 5     dapagliflozin propanediol (FARXIGA) 10 mg tablet Take 1 tablet (10 mg total) by mouth once daily. 28 tablet 0    dapagliflozin propanediol (FARXIGA) 10 mg tablet Take 1 tablet (10 mg total) by mouth once daily. 28 tablet 0    ELIQUIS 5 mg Tab Take 2.5 mg by mouth 2 (two) times daily.      FEROSUL 325 mg (65 mg iron) Tab tablet Take 1 tablet by mouth 3 (three) times daily.      gas permeable  (OXYGEN PERMEABLE  MISC) Oxygen      magnesium oxide (MAG-OX) 400 mg (241.3 mg magnesium) tablet Take 1 tablet by mouth every morning.      meclizine (ANTIVERT) 25 mg tablet Take 1 tablet (25 mg total) by mouth 3 (three) times daily as needed for Dizziness. 270 tablet 1    memantine (NAMENDA) 10 MG Tab Take 10 mg by mouth once daily.      methocarbamoL (ROBAXIN) 500 MG Tab Take 500 mg by mouth 4 (four) times daily as needed.      omeprazole (PRILOSEC) 40 MG capsule Take 40 mg by mouth every morning.      ondansetron (ZOFRAN-ODT) 4 MG TbDL Take 1 tablet (4 mg total) by mouth every 8 (eight) hours as needed (Nausea.). 120 tablet 3    predniSONE (DELTASONE) 20 MG tablet Take 1 tablet (20 mg total) by mouth 2 (two) times daily as needed (RASH). 30 tablet 2    sacubitriL-valsartan (ENTRESTO) 24-26 mg per tablet Take 1 tablet by mouth 2 (two) times daily. 56 tablet 0    sertraline (ZOLOFT) 50 MG tablet Take 1 tablet (50 mg total) by mouth once daily. For MOOD 90 tablet 1    spironolactone (ALDACTONE) 50 MG tablet Take 1 tablet (50 mg total) by mouth Daily. 90 tablet 3       Allergies  Review of patient's allergies indicates:   Allergen Reactions    Pollen extracts Rash     Allergy to hay       Physical Examination     Vitals:    01/10/25 1307   BP: 116/71   Pulse: 82   Resp: 18   Temp: 97.5 °F (36.4 °C)     Physical Exam  Vitals and nursing note reviewed.   Constitutional:       General: He is not in acute distress.     Appearance: Normal appearance. He is not ill-appearing.   Eyes:      Extraocular Movements:  Extraocular movements intact.      Pupils: Pupils are equal, round, and reactive to light.   Cardiovascular:      Rate and Rhythm: Normal rate and regular rhythm.   Pulmonary:      Effort: Pulmonary effort is normal.      Breath sounds: Normal breath sounds.   Musculoskeletal:      Lumbar back: Tenderness present. Decreased range of motion.      Comments: He appears to be in severe pain    Skin:     General: Skin is warm.      Findings: No rash.   Neurological:      General: No focal deficit present.      Mental Status: He is alert and oriented to person, place, and time. Mental status is at baseline.   Psychiatric:         Mood and Affect: Mood normal.         Behavior: Behavior normal.            Assessment and Plan (including Health Maintenance)      Problem List  Smart Sets  Document Outside HM   :    Plan:     He has 3 compression fractures. There is no wonder he is in severe pain     Oxycodone in the short term. He may need to follow with pain management if he needs continued opiates.     Health Maintenance Due   Topic Date Due    Shingles Vaccine (1 of 2) Never done    TETANUS VACCINE  09/17/2012    RSV Vaccine (Age 60+ and Pregnant patients) (1 - 1-dose 75+ series) Never done    COVID-19 Vaccine (3 - Moderna risk series) 12/20/2021    Lipid Panel  09/03/2024       Problem List Items Addressed This Visit          Neuro    Moderate vascular dementia with psychotic disturbance       Pulmonary    Chronic obstructive pulmonary disease (Chronic)    Chronic respiratory failure with hypoxia, on home O2 therapy       Cardiac/Vascular    Atrial flutter (Chronic)    Chronic systolic (congestive) heart failure (Chronic)       Oncology    Pancytopenia (Chronic)       GI    Cirrhosis of liver with ascites, unspecified hepatic cirrhosis type (Chronic)       Orthopedic    Chronic right-sided low back pain without sciatica (Chronic)     Other Visit Diagnoses       Compression fracture of fourth lumbar vertebra with delayed  healing  (Chronic)   -  Primary    Relevant Medications    oxyCODONE-acetaminophen (PERCOCET) 5-325 mg per tablet    ketorolac injection 60 mg (Completed)    dexAMETHasone injection 4 mg (Completed)    methylPREDNISolone acetate injection 40 mg (Completed)            Health Maintenance Topics with due status: Not Due       Topic Last Completion Date    Colonoscopy 05/23/2024       Procedures     Future Appointments   Date Time Provider Department Center   2/14/2025 10:20 AM Shahrzad Reynaga NP [facility]   4/21/2025  1:15 PM James Hughes MD [facility]        Follow up in about 3 months (around 4/10/2025), or if symptoms worsen or fail to improve, for chronic health problems.     Signature:  Raymon Tyler MD  [address] [place] [address]  Phone #: [phone]  Fax #: [phone]    Date of encounter: 1/10/25    There are no Patient Instructions on file for this visit.

## 2025-01-22 ENCOUNTER — EXTERNAL HOME HEALTH (OUTPATIENT)
Dept: HOME HEALTH SERVICES | Facility: HOSPITAL | Age: 80
End: 2025-01-22
Payer: MEDICARE

## 2025-02-12 DIAGNOSIS — C24.0 CANCER OF COMMON BILE DUCT: Primary | Chronic | ICD-10-CM

## 2025-02-12 DIAGNOSIS — F03.918 DEMENTIA WITH BEHAVIORAL DISTURBANCE: Chronic | ICD-10-CM

## 2025-02-13 RX ORDER — LORAZEPAM 0.5 MG/1
TABLET ORAL
Qty: 30 TABLET | Refills: 0 | Status: SHIPPED | OUTPATIENT
Start: 2025-02-13

## 2025-02-14 ENCOUNTER — TELEPHONE (OUTPATIENT)
Dept: FAMILY MEDICINE | Facility: CLINIC | Age: 80
End: 2025-02-14
Payer: MEDICARE

## 2025-02-14 NOTE — TELEPHONE ENCOUNTER
Patient's wife called and states that he has been going to [facility] and they have recommended putting cement in his back. Wife just wants your opinion if you think that will help him. States that he is on a Fentanyl patch but it is not helping him at all right now. Please advise

## 2025-02-21 DIAGNOSIS — F33.9 DEPRESSION, RECURRENT: Primary | ICD-10-CM

## 2025-02-21 DIAGNOSIS — F03.918 DEMENTIA WITH BEHAVIORAL DISTURBANCE: Chronic | ICD-10-CM

## 2025-02-24 RX ORDER — BUSPIRONE HYDROCHLORIDE 7.5 MG/1
7.5 TABLET ORAL 3 TIMES DAILY PRN
Qty: 90 TABLET | Refills: 2 | Status: SHIPPED | OUTPATIENT
Start: 2025-02-24 | End: 2026-02-24

## 2025-03-10 ENCOUNTER — CLINICAL SUPPORT (OUTPATIENT)
Dept: CARDIOLOGY | Facility: HOSPITAL | Age: 80
End: 2025-03-10
Payer: MEDICARE

## 2025-03-10 ENCOUNTER — TELEPHONE (OUTPATIENT)
Dept: CARDIOLOGY | Facility: CLINIC | Age: 80
End: 2025-03-10
Payer: MEDICARE

## 2025-03-10 ENCOUNTER — HOSPITAL ENCOUNTER (OUTPATIENT)
Dept: CARDIOLOGY | Facility: HOSPITAL | Age: 80
Discharge: HOME | End: 2025-03-10
Attending: INTERNAL MEDICINE
Payer: MEDICARE

## 2025-03-10 DIAGNOSIS — Z95.810 PRESENCE OF AUTOMATIC (IMPLANTABLE) CARDIAC DEFIBRILLATOR: ICD-10-CM

## 2025-03-10 DIAGNOSIS — I49.8 OTHER SPECIFIED CARDIAC ARRHYTHMIAS: ICD-10-CM

## 2025-03-10 PROCEDURE — 93296 REM INTERROG EVL PM/IDS: CPT | Performed by: INTERNAL MEDICINE

## 2025-03-10 PROCEDURE — 93295 DEV INTERROG REMOTE 1/2/MLT: CPT | Mod: ,,, | Performed by: INTERNAL MEDICINE

## 2025-03-10 NOTE — TELEPHONE ENCOUNTER
----- Message from Hector sent at 3/10/2025 11:52 AM CDT -----  Who Called: Tiny GutierrezPatient is returning phone callDoes the patient know what this is regarding?:needs medication that they were told to come to the office for trying to see if they has it because they were coming to [place] tomorrow . Farxiga and neal . If not home leave message on answering machinePreferred Method of Contact: Phone CallPatient's Preferred Phone Number on File: [phone]      Placed call to patient no answer left voicemail about not having samples of above medications

## 2025-03-11 ENCOUNTER — OFFICE VISIT (OUTPATIENT)
Dept: GASTROENTEROLOGY | Facility: CLINIC | Age: 80
End: 2025-03-11
Payer: MEDICARE

## 2025-03-11 ENCOUNTER — RESULTS FOLLOW-UP (OUTPATIENT)
Dept: GASTROENTEROLOGY | Facility: CLINIC | Age: 80
End: 2025-03-11
Payer: MEDICARE

## 2025-03-11 VITALS
HEART RATE: 84 BPM | DIASTOLIC BLOOD PRESSURE: 70 MMHG | BODY MASS INDEX: 24.65 KG/M2 | HEIGHT: 73 IN | SYSTOLIC BLOOD PRESSURE: 113 MMHG | OXYGEN SATURATION: 94 % | WEIGHT: 186 LBS

## 2025-03-11 DIAGNOSIS — R18.8 CIRRHOSIS OF LIVER WITH ASCITES, UNSPECIFIED HEPATIC CIRRHOSIS TYPE: Primary | Chronic | ICD-10-CM

## 2025-03-11 DIAGNOSIS — T40.2X5A THERAPEUTIC OPIOID INDUCED CONSTIPATION: ICD-10-CM

## 2025-03-11 DIAGNOSIS — K74.60 CIRRHOSIS OF LIVER WITH ASCITES, UNSPECIFIED HEPATIC CIRRHOSIS TYPE: Primary | Chronic | ICD-10-CM

## 2025-03-11 DIAGNOSIS — K59.03 THERAPEUTIC OPIOID INDUCED CONSTIPATION: ICD-10-CM

## 2025-03-11 PROCEDURE — 85610 PROTHROMBIN TIME: CPT

## 2025-03-11 PROCEDURE — 99214 OFFICE O/P EST MOD 30 MIN: CPT | Mod: S$PBB,,,

## 2025-03-11 PROCEDURE — 99213 OFFICE O/P EST LOW 20 MIN: CPT | Mod: PBBFAC

## 2025-03-11 PROCEDURE — 99999 PR PBB SHADOW E&M-EST. PATIENT-LVL III: CPT | Mod: PBBFAC,,,

## 2025-03-11 RX ORDER — NALOXEGOL OXALATE 25 MG/1
25 TABLET, FILM COATED ORAL DAILY
COMMUNITY
Start: 2025-03-04 | End: 2025-06-02

## 2025-03-11 NOTE — PATIENT INSTRUCTIONS
Constipation     -I recommend a bowel cleanse with a gatorade miralax prep: take 20 mg (4) of dulcolax orally and then mix 238 grams (14 capfuls) of miralax in 64 oz of your favorite zero sugar gatorade and drink over a 4 hour period on a day when you will be at home    -After your bowel cleanse, I recommend starting a daily fiber supplement with Metamucil,Citrucel, fiber gummies or Fibercon (can purchase at your local pharmacy)    -I recommend that you also use Miralax 17 grams daily-to-twice daily to have a regular, soft BM without straining - you can adjust how often you need miralax, but start with daily dosing and go from there    -I recommend drinking at least 60-80 ounces of water daily unless you have a medical condition that requires fluid restriction     Continue using the miralax daily up to twice a day for the next two weeks while movantik is getting in his system.

## 2025-03-11 NOTE — PROGRESS NOTES
CC:  Cirrhosis    HPI 79 y.o. white male presents today for follow-up on history of cirrhosis.  Patient presents today going on a stretcher in room due to fracture of his T12-L3 and L5 with compression deformity at L5 in combination with degenerative changes.  Suspect bilateral sacral insufficiency fractures.  Patient has had 2 previous kyphoplasties and is scheduled to have 1 more tomorrow.  Due to use of pain medication since back fractures patient has had severe constipation opioid induced.  Patient was placed on Movantik and he did take several doses of that but has not picked up the prescription and continues to be constipated.  He was recently in the ER at [facility] with complaint of severe constipation P patient was instructed to use the MiraLax and Gatorade bowel cleanse.  Patient and wife stated that this did help patient was able to start passing gas and get some relief.  Discussed with the patient about picking up the Movantik and beginning the daily Movantik along with using MiraLax daily patient and wife agreeable to this plan.  Patient denies any hematochezia or melena.  No nausea vomiting or dysphagia.  He does not have any bilateral lower extremity swelling or abdominal ascites.  His abdomen is soft and nontender.  Patient will have another CT next week at [facility] with Oncology we will plan labs today for cirrhosis.    Medical records reviewed. Additional history supplemented by nursing.     Past Medical History:   Diagnosis Date    Aortic heart murmur     Asthma     Cancer     Deep vein thrombosis     Emphysema/COPD     Hyperlipidemia     Ischemic cardiomyopathy     Mitral regurgitation     Old myocardial infarction 03/2001    Tricuspid regurgitation     Vitamin B 12 deficiency     Vitamin D deficiency          Review of Systems  Gastrointestinal ROS: no abdominal pain, change in bowel habits, or black/ bloody stools, +opoid induced constipation    Physical Examination  There were  no vitals taken for this visit.  General appearance: alert, cooperative, no distress  Abdomen: soft, non-tender; bowel sounds normoactive; no organomegaly      Labs:  Lab Results   Component Value Date    WBC 5.30 11/14/2024    HGB 11.9 (L) 11/14/2024    HCT 37.9 (L) 11/14/2024    .2 (H) 11/14/2024     (L) 11/14/2024       CMP  Sodium   Date Value Ref Range Status   11/14/2024 143 136 - 145 mmol/L Final   04/19/2024 141 136 - 145 mmol/L Final     Potassium   Date Value Ref Range Status   11/14/2024 4.5 3.5 - 5.1 mmol/L Final   04/19/2024 3.4 (L) 3.5 - 5.1 mmol/L Final     Comment:     slight hemolysis     Chloride   Date Value Ref Range Status   11/14/2024 110 (H) 98 - 107 mmol/L Final   04/19/2024 106 100 - 109 mmol/L Final     CO2   Date Value Ref Range Status   11/14/2024 27 23 - 31 mmol/L Final     Carbon Dioxide   Date Value Ref Range Status   04/19/2024 27 22 - 33 mmol/L Final     Glucose   Date Value Ref Range Status   11/14/2024 98 82 - 115 mg/dL Final     BUN   Date Value Ref Range Status   11/14/2024 15 8 - 26 mg/dL Final     Blood Urea Nitrogen   Date Value Ref Range Status   04/19/2024 18 5 - 25 mg/dL Final     Creatinine   Date Value Ref Range Status   11/14/2024 1.11 0.72 - 1.25 mg/dL Final   04/19/2024 0.99 0.73 - 1.18 mg/dL Final     Calcium   Date Value Ref Range Status   11/14/2024 9.0 8.8 - 10.0 mg/dL Final   04/19/2024 8.8 8.8 - 10.6 mg/dL Final     Total Protein   Date Value Ref Range Status   11/14/2024 6.4 5.8 - 7.6 g/dL Final     Albumin   Date Value Ref Range Status   11/14/2024 3.2 (L) 3.4 - 4.8 g/dL Final     Bilirubin, Total   Date Value Ref Range Status   11/14/2024 0.8 <=1.5 mg/dL Final     Alk Phos   Date Value Ref Range Status   11/14/2024 129 40 - 150 U/L Final     AST   Date Value Ref Range Status   11/14/2024 32 5 - 34 U/L Final     ALT   Date Value Ref Range Status   11/14/2024 21 0 - 55 U/L Final     Anion Gap   Date Value Ref Range Status   11/14/2024 11 7 - 16  mmol/L Final   04/19/2024 8 8 - 16 mmol/L Final     eGFR    Date Value Ref Range Status   04/19/2024 77 mL/min/1.73mSq Final     Comment:     In accordance with NKF-ASN Task Force recommendation, calculation based on the Chronic Kidney Disease Epidemiology Collaboration (CKD-EPI) equation without adjustment for race. eGFR adjusted for gender and age and calculated in ml/min/1.73mSquared. eGFR cannot be calculated if patient is under 18 years of age.     Reference Range:   >= 60 ml/min/1.73mSquared.     eGFR   Date Value Ref Range Status   10/27/2021 67 >=60 mL/min/1.73m² Final       Imaging:  CT Chest Abdomen Pelvis With IV Contrast (XPD)  Order: [id number]  Impression    Impression:    Postprocedural changes are stable. No findings suspicious for  recurrent or metastatic disease are seen. Other incidental findings as  above. This includes a slightly more prominent umbilical hernia.  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  Sagittal and coronal reconstructed images.    All CT examinations performed at this facility use at least one of the  following dose optimization techniques: Automated exposure control, mA  and/or kV adjustment per patient size, or iterative reconstruction    Findings:    Scattered calcified granulomas in the lungs. These are stable. No new  focal abnormalities in the lungs are seen. No pleural effusion,  mediastinal mass or enlarged thoracic lymph nodes are seen.    Postoperative changes related to Whipple procedure are again seen.  Changes are stable in appearance. There is  steatosis of the liver but  no significant focal abnormality. Remaining pancreas is unremarkable.  No enlarging mass or lymphadenopathy is seen.    The other solid organs of the abdomen appear within normal limits.     structures in the pelvis are unremarkable. No pelvic mass or  adenopathy is seen.    There is diverticulosis of the distal colon without evidence of  diverticulitis. There is a small umbilical hernia contains a loop of  small bowel. This is slightly more prominent when compared to the  prior exam though no evidence of mechanical obstruction is seen. Loops  of bowel are otherwise unremarkable. No free fluid or free air is  seen.    Stable osseous structures.    Independently reviewed    Assessment: Tiny Gutierrez 78 yo WM here with:  Cirrhosis without ascites  Chronic pain due to fracture multiple vertebrae  Opioid induced constipation    Plan:  Get Movantik 25 mg p.o. daily filled as prescribed  -I recommend a bowel cleanse with a gatorade miralax prep: take 20 mg (4) of dulcolax orally and then mix 238 grams (14 capfuls) of miralax in 64 oz of your favorite zero sugar gatorade and drink over a 4 hour period on a day when you will be at home  -After your bowel cleanse, I recommend starting a daily fiber supplement with Metamucil,Citrucel, fiber gummies or Fibercon (can purchase at your local pharmacy)  -I recommend that you also use Miralax 17 grams daily-to-twice daily to have a regular, soft BM without straining - you can adjust how often you need miralax, but start with daily dosing and go from there  -I recommend drinking at least 60-80 ounces of water daily unless you have a medical condition that requires fluid restriction   Labs today CBC CMP and PT  Patient to have CT of the abdomen at [facility] next week  Follow-up in six-month or sooner    I spent a total of 30 minutes on the day of the visit.This includes face to face time and non-face to face time preparing to see the patient  (eg, review of tests), obtaining and/or reviewing separately obtained history, documenting clinical information in the electronic or other health record, independently interpreting results and communicating results to the patient/family/caregiver, or care coordinator.     Carla Adams, FNP [facility]

## 2025-03-15 ENCOUNTER — DOCUMENT SCAN (OUTPATIENT)
Dept: HOME HEALTH SERVICES | Facility: HOSPITAL | Age: 80
End: 2025-03-15
Payer: MEDICARE

## 2025-03-17 ENCOUNTER — TELEPHONE (OUTPATIENT)
Dept: GASTROENTEROLOGY | Facility: CLINIC | Age: 80
End: 2025-03-17
Payer: MEDICARE

## 2025-03-17 NOTE — PROGRESS NOTES
Labs continue to remain stable at this time.  His MELD score remains low at 8 we will continue to monitor his labs at each visit. If the MELD begins to increase that is when we will send him to hepatology. We usually send if it gets to 14/15  If he begins to have lower extremity swelling, becomes more short of breath than normal, feels as though his abdomen is full of fluid, or begins itching all over he will need to return to emergency room or come here  MELD 3.0: 8 at 3/11/2025  2:20 PM  MELD-Na: 7 at 3/11/2025  2:20 PM  Calculated from:  Serum Creatinine: 0.95 mg/dL (Using min of 1 mg/dL) at 3/11/2025  2:20 PM  Serum Sodium: 138 mmol/L (Using max of 137 mmol/L) at 3/11/2025  2:20 PM  Total Bilirubin: 0.7 mg/dL (Using min of 1 mg/dL) at 3/11/2025  2:20 PM  Serum Albumin: 3.2 g/dL at 3/11/2025  2:20 PM  INR(ratio): 1.13 at 3/11/2025  2:20 PM  Age at listing (hypothetical): 79 years  Sex: Male at 3/11/2025  2:20 PM

## 2025-03-17 NOTE — TELEPHONE ENCOUNTER
Attempted to speak w/ pt whom requested results be reviewed w/ spouse - results and recommendations reviewed w/ good vu noted - advised to keep f/u as scheduled

## 2025-03-17 NOTE — TELEPHONE ENCOUNTER
----- Message from Shahrzad Reynaga NP sent at 3/17/2025 12:15 PM CDT -----  Labs continue to remain stable at this time.  His MELD score remains low at 8 we will continue to monitor his labs at each visit. If the MELD begins to increase that is when we will send him to hepatology. We usually send if it gets to 14/15  If he begins to have lower extremity swelling, becomes more short of breath than normal, feels as though his abdomen is full of fluid, or begins itching all over he will need to return to emergency   room or come here  MELD 3.0: 8 at 3/11/2025  2:20 PM  MELD-Na: 7 at 3/11/2025  2:20 PM  Calculated from:  Serum Creatinine: 0.95 mg/dL (Using min of 1 mg/dL) at 3/11/2025  2:20 PM  Serum Sodium: 138 mmol/L (Using max of 137 mmol/L) at 3/11/2025  2:20 PM  Total Bilirubin: 0.7 mg/dL (Using min of 1 mg/dL) at 3/11/2025  2:20 PM  Serum Albumin: 3.2 g/dL at 3/11/2025  2:20 PM  INR(ratio): 1.13 at 3/11/2025  2:20 PM  Age at listing (hypothetical): 79 years  Sex: Male at 3/11/2025  2:20 PM     ----- Message -----  From: Lab, Background User  Sent: 3/11/2025   2:50 PM CDT  To: Shahrzad Reynaga NP

## 2025-03-29 LAB
OHS CV AF BURDEN PERCENT: < 1
OHS CV DC REMOTE DEVICE TYPE: NORMAL
OHS CV ICD SHOCK: NO
OHS CV RV PACING PERCENT: 0.2 %

## 2025-04-01 ENCOUNTER — OFFICE VISIT (OUTPATIENT)
Dept: FAMILY MEDICINE | Facility: CLINIC | Age: 80
End: 2025-04-01
Payer: MEDICARE

## 2025-04-01 VITALS
WEIGHT: 187.13 LBS | RESPIRATION RATE: 18 BRPM | HEIGHT: 73 IN | HEART RATE: 69 BPM | DIASTOLIC BLOOD PRESSURE: 63 MMHG | BODY MASS INDEX: 24.8 KG/M2 | TEMPERATURE: 98 F | SYSTOLIC BLOOD PRESSURE: 102 MMHG | OXYGEN SATURATION: 98 %

## 2025-04-01 DIAGNOSIS — R53.82 CHRONIC FATIGUE: ICD-10-CM

## 2025-04-01 DIAGNOSIS — E53.1 PYRIDOXINE DEFICIENCY: ICD-10-CM

## 2025-04-01 DIAGNOSIS — E53.8 B12 DEFICIENCY: Chronic | ICD-10-CM

## 2025-04-01 DIAGNOSIS — E46 MALNUTRITION, UNSPECIFIED TYPE: ICD-10-CM

## 2025-04-01 DIAGNOSIS — E55.9 VITAMIN D DEFICIENCY: ICD-10-CM

## 2025-04-01 DIAGNOSIS — H66.002 NON-RECURRENT ACUTE SUPPURATIVE OTITIS MEDIA OF LEFT EAR WITHOUT SPONTANEOUS RUPTURE OF TYMPANIC MEMBRANE: ICD-10-CM

## 2025-04-01 DIAGNOSIS — D50.8 IRON DEFICIENCY ANEMIA SECONDARY TO INADEQUATE DIETARY IRON INTAKE: Primary | ICD-10-CM

## 2025-04-01 LAB
25(OH)D3 SERPL-MCNC: 31.9 NG/ML (ref 30–80)
ALBUMIN SERPL BCP-MCNC: 3.2 G/DL (ref 3.4–4.8)
ALBUMIN/GLOB SERPL: 1 {RATIO}
ALP SERPL-CCNC: 180 U/L (ref 40–150)
ALT SERPL W P-5'-P-CCNC: 23 U/L
ANION GAP SERPL CALCULATED.3IONS-SCNC: 11 MMOL/L (ref 7–16)
AST SERPL W P-5'-P-CCNC: 38 U/L (ref 11–45)
BASOPHILS # BLD AUTO: 0.03 K/UL (ref 0–0.2)
BASOPHILS NFR BLD AUTO: 0.5 % (ref 0–1)
BILIRUB SERPL-MCNC: 0.5 MG/DL
BUN SERPL-MCNC: 16 MG/DL (ref 8–26)
BUN/CREAT SERPL: 14 (ref 6–20)
CALCIUM SERPL-MCNC: 9.1 MG/DL (ref 8.8–10)
CHLORIDE SERPL-SCNC: 107 MMOL/L (ref 98–107)
CO2 SERPL-SCNC: 26 MMOL/L (ref 23–31)
CREAT SERPL-MCNC: 1.17 MG/DL (ref 0.72–1.25)
DIFFERENTIAL METHOD BLD: ABNORMAL
EGFR (NO RACE VARIABLE) (RUSH/TITUS): 63 ML/MIN/1.73M2
EOSINOPHIL # BLD AUTO: 0.29 K/UL (ref 0–0.5)
EOSINOPHIL NFR BLD AUTO: 5.2 % (ref 1–4)
ERYTHROCYTE [DISTWIDTH] IN BLOOD BY AUTOMATED COUNT: 14.9 % (ref 11.5–14.5)
GLOBULIN SER-MCNC: 3.2 G/DL (ref 2–4)
GLUCOSE SERPL-MCNC: 89 MG/DL (ref 82–115)
HCT VFR BLD AUTO: 42.1 % (ref 40–54)
HGB BLD-MCNC: 13.1 G/DL (ref 13.5–18)
IMM GRANULOCYTES # BLD AUTO: 0.02 K/UL (ref 0–0.04)
IMM GRANULOCYTES NFR BLD: 0.4 % (ref 0–0.4)
LYMPHOCYTES # BLD AUTO: 0.9 K/UL (ref 1–4.8)
LYMPHOCYTES NFR BLD AUTO: 16.1 % (ref 27–41)
MCH RBC QN AUTO: 32.1 PG (ref 27–31)
MCHC RBC AUTO-ENTMCNC: 31.1 G/DL (ref 32–36)
MCV RBC AUTO: 103.2 FL (ref 80–96)
MONOCYTES # BLD AUTO: 0.44 K/UL (ref 0–0.8)
MONOCYTES NFR BLD AUTO: 7.9 % (ref 2–6)
MPC BLD CALC-MCNC: 9.5 FL (ref 9.4–12.4)
NEUTROPHILS # BLD AUTO: 3.91 K/UL (ref 1.8–7.7)
NEUTROPHILS NFR BLD AUTO: 69.9 % (ref 53–65)
NRBC # BLD AUTO: 0 X10E3/UL
NRBC, AUTO (.00): 0 %
PLATELET # BLD AUTO: 152 K/UL (ref 150–400)
POTASSIUM SERPL-SCNC: 4.2 MMOL/L (ref 3.5–5.1)
PROT SERPL-MCNC: 6.4 G/DL (ref 5.8–7.6)
RBC # BLD AUTO: 4.08 M/UL (ref 4.6–6.2)
SODIUM SERPL-SCNC: 140 MMOL/L (ref 136–145)
T4 FREE SERPL-MCNC: 0.89 NG/DL (ref 0.7–1.48)
TSH SERPL DL<=0.005 MIU/L-ACNC: 3.33 UIU/ML (ref 0.35–4.94)
VIT B12 SERPL-MCNC: 469 PG/ML (ref 213–816)
WBC # BLD AUTO: 5.59 K/UL (ref 4.5–11)

## 2025-04-01 PROCEDURE — 85025 COMPLETE CBC W/AUTO DIFF WBC: CPT | Mod: ,,, | Performed by: CLINICAL MEDICAL LABORATORY

## 2025-04-01 PROCEDURE — 99214 OFFICE O/P EST MOD 30 MIN: CPT | Mod: ,,, | Performed by: FAMILY MEDICINE

## 2025-04-01 PROCEDURE — 80053 COMPREHEN METABOLIC PANEL: CPT | Mod: ,,, | Performed by: CLINICAL MEDICAL LABORATORY

## 2025-04-01 RX ORDER — FERROUS SULFATE 325(65) MG
325 TABLET ORAL 2 TIMES DAILY
Qty: 180 TABLET | Refills: 1 | Status: SHIPPED | OUTPATIENT
Start: 2025-04-01

## 2025-04-01 RX ORDER — BUSPIRONE HYDROCHLORIDE 10 MG/1
10 TABLET ORAL 2 TIMES DAILY
COMMUNITY

## 2025-04-01 RX ORDER — TRAMADOL HYDROCHLORIDE 50 MG/1
50 TABLET ORAL EVERY 8 HOURS PRN
COMMUNITY
Start: 2025-02-11

## 2025-04-01 RX ORDER — FERROUS SULFATE 325(65) MG
325 TABLET ORAL 3 TIMES DAILY
Qty: 270 TABLET | Refills: 1 | Status: SHIPPED | OUTPATIENT
Start: 2025-04-01 | End: 2025-04-01

## 2025-04-01 RX ORDER — NEOMYCIN SULFATE, POLYMYXIN B SULFATE, HYDROCORTISONE 3.5; 10000; 1 MG/ML; [USP'U]/ML; MG/ML
3 SOLUTION/ DROPS AURICULAR (OTIC) EVERY 6 HOURS
Qty: 10 ML | Refills: 0 | Status: SHIPPED | OUTPATIENT
Start: 2025-04-01

## 2025-04-01 NOTE — PROGRESS NOTES
Raymon Tyler MD    [address] [place] [address]     PATIENT NAME: Tiny Gutierrez  : 1945  DATE: 25  MRN: [id number]      Billing Provider: Raymon Tyler MD  Level of Service: [facility] OFFICE/OUTPT VISIT, EST, LEVL IV, 30-39 MIN  Patient PCP Information       Provider PCP Type    Raymon Tyler MD General                   Update PCP  Update Chief Complaint         History of Present Illness / Problem Focused Workflow     Tiny Gutierrez presents to the clinic to discuss feeling unwell.         Chief Complaint   Patient presents with    Fatigue     Weak and has no energy    Ear Fullness     Left ear is stopped up      He has been dealing with a lot of pain issues. Now he has no energy    HPI    Review of Systems     Review of Systems   Constitutional:  Positive for fatigue. Negative for activity change, appetite change, chills and fever.   HENT:  Negative for nasal congestion, ear pain, hearing loss, postnasal drip and sore throat.    Respiratory:  Negative for cough, chest tightness, shortness of breath and wheezing.    Cardiovascular:  Negative for chest pain, palpitations, leg swelling and claudication.   Gastrointestinal:  Negative for abdominal pain, change in bowel habit, constipation, diarrhea, nausea and vomiting.   Genitourinary:  Negative for dysuria.   Musculoskeletal:  Positive for arthralgias, back pain, gait problem and myalgias.   Integumentary:  Negative for rash.   Neurological:  Negative for weakness and headaches.   Psychiatric/Behavioral:  Negative for suicidal ideas. The patient is not nervous/anxious.         Medical / Social / Family History     Past Medical History:   Diagnosis Date    Aortic heart murmur     Asthma     Cancer     Deep vein thrombosis     Emphysema/COPD     Hyperlipidemia     Ischemic cardiomyopathy     Mitral regurgitation     Old myocardial infarction 2001    Tricuspid regurgitation     Vitamin  B 12 deficiency     Vitamin D deficiency        Past Surgical History:   Procedure Laterality Date    CARDIAC CATHETERIZATION      CARDIAC DEFIBRILLATOR PLACEMENT      CHOLECYSTECTOMY      CORONARY ARTERY BYPASS GRAFT      TOTAL KNEE ARTHROPLASTY Bilateral        Social History    reports that he has never smoked. He has never been exposed to tobacco smoke. He has never used smokeless tobacco. He reports that he does not drink alcohol and does not use drugs.    Family History  's family history includes Emphysema in his father.    Medications and Allergies     Medications  Outpatient Medications Marked as Taking for the 4/1/25 encounter (Office Visit) with Raymon Tyler MD   Medication Sig Dispense Refill    albuterol (PROVENTIL) 2.5 mg /3 mL (0.083 %) nebulizer solution Take 3 mLs (2.5 mg total) by nebulization as needed for Wheezing or Shortness of Breath. 300 mL 5    albuterol (PROVENTIL/VENTOLIN HFA) 90 mcg/actuation inhaler 2 puffs as needed      atorvastatin (LIPITOR) 40 MG tablet Take 1 tablet (40 mg total) by mouth every evening. For CHOLESTEROL 90 tablet 1    betamethasone dipropionate 0.05 % cream Apply at thin layer of cream to the back and chest twice daily as needed for itching and RASH 90 g 1    BREO ELLIPTA 200-25 mcg/dose DsDv diskus inhaler 1 puff once daily.      busPIRone (BUSPAR) 10 MG tablet Take 10 mg by mouth 2 (two) times daily.      calcium carbonate/vitamin D3 (CALTRATE 600 + D ORAL) Take 1 capsule by mouth Daily.      CREON 24,000-76,000 -120,000 unit capsule Take 1 capsule by mouth 3 (three) times daily.      cyanocobalamin 1,000 mcg/mL injection Inject 1 mL (1,000 mcg total) into the muscle every 30 days. 10 mL 5    dapagliflozin propanediol (FARXIGA) 10 mg tablet Take 1 tablet (10 mg total) by mouth once daily. 28 tablet 0    ELIQUIS 5 mg Tab Take 2.5 mg by mouth 2 (two) times daily.      gas permeable  (OXYGEN PERMEABLE  MISC) Oxygen      LORazepam  (ATIVAN) 0.5 MG tablet TAKE 1 TABLET BY MOUTH EVERY 12 HOURS AS NEEDED FOR ANXIETY AND AGITATION 30 tablet 0    magnesium oxide (MAG-OX) 400 mg (241.3 mg magnesium) tablet Take 1 tablet by mouth every morning.      meclizine (ANTIVERT) 25 mg tablet Take 1 tablet (25 mg total) by mouth 3 (three) times daily as needed for Dizziness. 270 tablet 1    memantine (NAMENDA) 10 MG Tab Take 10 mg by mouth once daily.      MOVANTIK 25 mg tablet Take 25 mg by mouth once daily.      omeprazole (PRILOSEC) 40 MG capsule Take 40 mg by mouth every morning.      ondansetron (ZOFRAN-ODT) 4 MG TbDL Take 1 tablet (4 mg total) by mouth every 8 (eight) hours as needed (Nausea.). 120 tablet 3    oxyCODONE-acetaminophen (PERCOCET) 5-325 mg per tablet Take 1 tablet by mouth every 6 (six) hours as needed for Pain. 28 tablet 0    sacubitriL-valsartan (ENTRESTO) 24-26 mg per tablet Take 1 tablet by mouth 2 (two) times daily. 56 tablet 0    sertraline (ZOLOFT) 50 MG tablet Take 1 tablet (50 mg total) by mouth once daily. For MOOD 90 tablet 1    spironolactone (ALDACTONE) 50 MG tablet Take 1 tablet (50 mg total) by mouth Daily. 90 tablet 3    traMADoL (ULTRAM) 50 mg tablet Take 50 mg by mouth every 8 (eight) hours as needed.      [DISCONTINUED] FEROSUL 325 mg (65 mg iron) Tab tablet Take 1 tablet by mouth 3 (three) times daily.         Allergies  Review of patient's allergies indicates:   Allergen Reactions    Pollen extracts Rash     Allergy to hay       Physical Examination     Vitals:    04/01/25 1331   BP: 102/63   Pulse: 69   Resp: 18   Temp: 97.6 °F (36.4 °C)     Physical Exam  Vitals and nursing note reviewed.   Constitutional:       General: He is not in acute distress.     Appearance: Normal appearance. He is not ill-appearing.   Eyes:      Extraocular Movements: Extraocular movements intact.      Pupils: Pupils are equal, round, and reactive to light.   Cardiovascular:      Rate and Rhythm: Normal rate and regular rhythm.   Pulmonary:       Effort: Pulmonary effort is normal.      Breath sounds: Normal breath sounds.   Skin:     General: Skin is warm.      Findings: No rash.   Neurological:      General: No focal deficit present.      Mental Status: He is alert and oriented to person, place, and time. Mental status is at baseline.   Psychiatric:         Mood and Affect: Mood normal.         Behavior: Behavior normal.            Assessment and Plan (including Health Maintenance)      Problem List  Smart Sets  Document Outside HM   :    Plan:     Consider stopping Entresto to see if he feels better, reports just a swimmy head feeling    Left ear infection - polymyxin sent in     Health Maintenance Due   Topic Date Due    Shingles Vaccine (1 of 2) Never done    TETANUS VACCINE  09/17/2012    RSV Vaccine (Age 60+ and Pregnant patients) (1 - 1-dose 75+ series) Never done    COVID-19 Vaccine (3 - Moderna risk series) 12/20/2021    Lipid Panel  09/03/2024       Problem List Items Addressed This Visit          Endocrine    B12 deficiency (Chronic)    Relevant Orders    Vitamin B12       Other    Fatigue (Chronic)    Relevant Orders    Comprehensive Metabolic Panel    CBC Auto Differential    Thyroid Panel     Other Visit Diagnoses         Iron deficiency anemia secondary to inadequate dietary iron intake    -  Primary    Relevant Medications    FEROSUL 325 mg (65 mg iron) Tab tablet    Other Relevant Orders    Pyridoxal 5-Phosphate (Vitamin B6)      Non-recurrent acute suppurative otitis media of left ear without spontaneous rupture of tympanic membrane        Relevant Medications    neomycin-polymyxin-hydrocortisone (CORTISPORIN) otic solution      Malnutrition, unspecified type        Relevant Orders    Pyridoxal 5-Phosphate (Vitamin B6)      Vitamin D deficiency        Relevant Orders    Vitamin D      Pyridoxine deficiency        Relevant Orders    Pyridoxal 5-Phosphate (Vitamin B6)            Health Maintenance Topics with due status: Not Due        Topic Last Completion Date    Colonoscopy 05/23/2024       Procedures     Future Appointments   Date Time Provider Department Center   4/22/2025  1:45 PM Anahi Reina ACNP [facility]   9/11/2025  1:00 PM Shahrzad Reynaga, NP [facility]        Follow up if symptoms worsen or fail to improve.     Signature:  Raymon Tyler MD  [address] [place] [address]  Phone #: [phone]  Fax #: [phone]    Date of encounter: 4/1/25    There are no Patient Instructions on file for this visit.

## 2025-04-02 ENCOUNTER — TELEPHONE (OUTPATIENT)
Dept: CARDIOLOGY | Facility: CLINIC | Age: 80
End: 2025-04-02
Payer: MEDICARE

## 2025-04-02 NOTE — TELEPHONE ENCOUNTER
Person from MRI at [facility] called to see if our office got the fax for patient to have MRI today and if it was okay with his device.  Shruthi approved with Dr. Hughes that patient can have MRI based off MRI form signed in January.  -HW

## 2025-04-03 ENCOUNTER — RESULTS FOLLOW-UP (OUTPATIENT)
Dept: FAMILY MEDICINE | Facility: CLINIC | Age: 80
End: 2025-04-03

## 2025-04-03 ENCOUNTER — TELEPHONE (OUTPATIENT)
Dept: CARDIOLOGY | Facility: CLINIC | Age: 80
End: 2025-04-03
Payer: MEDICARE

## 2025-04-03 NOTE — TELEPHONE ENCOUNTER
"Wife notified to hold entresto 48 hours, then start losartan 25mg. Pt wife states, "I just gave him entresto today because I didn't know if you'd even call me back. What do I do if he starts swelling? Do I start his fluid pill back?" She was told I could not tell her that. She was notified to contact us with any issues.   "

## 2025-04-03 NOTE — TELEPHONE ENCOUNTER
----- Message from Claritza sent at 4/3/2025 11:12 AM CDT -----  Regarding: Following up From Yesterday  Who Called: Taylor Will is following up on her message from yesterday about the patient getting off of Entresto. She said she doesn't know if he should stop cold turkey or if he should ween off of it. Preferred Method of Contact: Phone CallPatient's Preferred Phone Number on File: [phone]

## 2025-04-04 ENCOUNTER — TELEPHONE (OUTPATIENT)
Dept: FAMILY MEDICINE | Facility: CLINIC | Age: 80
End: 2025-04-04
Payer: MEDICARE

## 2025-04-04 NOTE — TELEPHONE ENCOUNTER
Patient's wife called and states that he had cement in 3 vertebrae already. States it has helped some but then started hurting again and they did another MRI this week. States that they found 5 more and are recommended to have more cement put in. She is wondering what you recommend for them to do and if they think it is worth him doing that. MRI was done at [facility] this week. She states that [facility] told them that they did not recommend it the first time but then they went to pain clinic and they are the ones that recommended it to them. Dr. Cardenas is who did the surgery    States she is supposed to let them know on Monday what they decide.

## 2025-04-04 NOTE — TELEPHONE ENCOUNTER
Spoke with pt wife and told her what Dr. Tyler said. She states it is a hard decision to make if this is going to continue too happen to him. It is 5 now and after the procedure will it be more.

## 2025-04-07 ENCOUNTER — DOCUMENT SCAN (OUTPATIENT)
Dept: HOME HEALTH SERVICES | Facility: HOSPITAL | Age: 80
End: 2025-04-07
Payer: MEDICARE

## 2025-04-08 ENCOUNTER — TELEPHONE (OUTPATIENT)
Dept: CARDIOLOGY | Facility: CLINIC | Age: 80
End: 2025-04-08
Payer: MEDICARE

## 2025-04-08 NOTE — TELEPHONE ENCOUNTER
----- Message from Anjana sent at 4/4/2025 10:52 AM CDT -----  Who Called: Tiny Mccartney CiscoclarissaJefferycan is requesting assistance/information from provider's office.Pts wife is needing to speak to nurse about Doctor pt went to and they are putting cement in his spine. Wife states the doctor they went to found 5 more that needed cement and she is wanting to know if Dr. Hughes thinks he should keep having this done at his age. Wife said she knows this is not heart related but states they trust Dr. Hughes and wanted to get his opinion. Preferred Method of Contact: Phone CallPatient's Preferred Phone Number on File: [phone] Best Call Back Number, if different: [phone] Information:      Placed call to patient no answer left message  for call back

## 2025-04-10 ENCOUNTER — TELEPHONE (OUTPATIENT)
Dept: CARDIOLOGY | Facility: CLINIC | Age: 80
End: 2025-04-10
Payer: MEDICARE

## 2025-04-10 NOTE — TELEPHONE ENCOUNTER
----- Message from MAYITO Das sent at 4/2/2025 11:19 AM CDT -----  Regarding: FW: MEDICATION  Pt last saw Ellen. Would you discuss this with him and call wife??Thank you!!  ----- Message -----  From: Kerry Hampton  Sent: 4/2/2025  11:12 AM CDT  To: Latosha Christian S Staff  Subject: MEDICATION                                       Who Called: AGNES BRICENO, WIFEWhlydia Left Message for Patient:Does the patient know what this is regarding?:YESPreferred Method of Contact: Phone CallPatient's Preferred Phone Number on File: [phone] Call Back Number, if different:Additional Information  WANTS TO ASK ABOUT HOW HER  MIGHT GET OFF OF ENTRESTO      Spoke with spouse she reports she spoke with other staff here ,now she is just requesting samples of a medication when next visit

## 2025-04-14 DIAGNOSIS — C24.0 CANCER OF COMMON BILE DUCT: Chronic | ICD-10-CM

## 2025-04-14 DIAGNOSIS — F03.918 DEMENTIA WITH BEHAVIORAL DISTURBANCE: Chronic | ICD-10-CM

## 2025-04-17 RX ORDER — LORAZEPAM 0.5 MG/1
TABLET ORAL
Qty: 30 TABLET | Refills: 2 | Status: SHIPPED | OUTPATIENT
Start: 2025-04-17

## 2025-04-22 ENCOUNTER — EXTERNAL HOME HEALTH (OUTPATIENT)
Dept: HOME HEALTH SERVICES | Facility: HOSPITAL | Age: 80
End: 2025-04-22
Payer: MEDICARE

## 2025-05-01 ENCOUNTER — OFFICE VISIT (OUTPATIENT)
Dept: CARDIOLOGY | Facility: CLINIC | Age: 80
End: 2025-05-01
Payer: MEDICARE

## 2025-05-01 ENCOUNTER — RESULTS FOLLOW-UP (OUTPATIENT)
Dept: CARDIOLOGY | Facility: CLINIC | Age: 80
End: 2025-05-01

## 2025-05-01 ENCOUNTER — HOSPITAL ENCOUNTER (OUTPATIENT)
Dept: RADIOLOGY | Facility: HOSPITAL | Age: 80
Discharge: HOME | End: 2025-05-01
Attending: NURSE PRACTITIONER
Payer: MEDICARE

## 2025-05-01 VITALS
HEIGHT: 73 IN | SYSTOLIC BLOOD PRESSURE: 100 MMHG | DIASTOLIC BLOOD PRESSURE: 60 MMHG | OXYGEN SATURATION: 95 % | BODY MASS INDEX: 25.31 KG/M2 | WEIGHT: 191 LBS | HEART RATE: 78 BPM

## 2025-05-01 DIAGNOSIS — R06.02 SOB (SHORTNESS OF BREATH): ICD-10-CM

## 2025-05-01 DIAGNOSIS — I35.0 AORTIC VALVE STENOSIS, ETIOLOGY OF CARDIAC VALVE DISEASE UNSPECIFIED: ICD-10-CM

## 2025-05-01 DIAGNOSIS — I42.9 CARDIOMYOPATHY, UNSPECIFIED TYPE: ICD-10-CM

## 2025-05-01 DIAGNOSIS — E78.2 MIXED HYPERLIPIDEMIA: Chronic | ICD-10-CM

## 2025-05-01 DIAGNOSIS — R06.02 SOB (SHORTNESS OF BREATH): Primary | ICD-10-CM

## 2025-05-01 DIAGNOSIS — Z79.899 HIGH RISK MEDICATION USE: ICD-10-CM

## 2025-05-01 DIAGNOSIS — I25.5 ISCHEMIC CARDIOMYOPATHY: Chronic | ICD-10-CM

## 2025-05-01 DIAGNOSIS — I25.10 CORONARY ARTERY DISEASE INVOLVING NATIVE CORONARY ARTERY OF NATIVE HEART WITHOUT ANGINA PECTORIS: Chronic | ICD-10-CM

## 2025-05-01 DIAGNOSIS — I50.22 CHRONIC SYSTOLIC (CONGESTIVE) HEART FAILURE: Chronic | ICD-10-CM

## 2025-05-01 DIAGNOSIS — I48.92 ATRIAL FLUTTER, UNSPECIFIED TYPE: Chronic | ICD-10-CM

## 2025-05-01 DIAGNOSIS — I51.3 LEFT VENTRICULAR THROMBUS: ICD-10-CM

## 2025-05-01 DIAGNOSIS — Z95.1 S/P CABG X 3: Chronic | ICD-10-CM

## 2025-05-01 DIAGNOSIS — I10 PRIMARY HYPERTENSION: Chronic | ICD-10-CM

## 2025-05-01 PROCEDURE — 99215 OFFICE O/P EST HI 40 MIN: CPT | Mod: S$PBB,,, | Performed by: NURSE PRACTITIONER

## 2025-05-01 PROCEDURE — 71046 X-RAY EXAM CHEST 2 VIEWS: CPT | Mod: TC

## 2025-05-01 PROCEDURE — 99999 PR PBB SHADOW E&M-EST. PATIENT-LVL V: CPT | Mod: PBBFAC,,, | Performed by: NURSE PRACTITIONER

## 2025-05-01 PROCEDURE — 93005 ELECTROCARDIOGRAM TRACING: CPT | Mod: PBBFAC | Performed by: INTERNAL MEDICINE

## 2025-05-01 PROCEDURE — 99215 OFFICE O/P EST HI 40 MIN: CPT | Mod: PBBFAC,25 | Performed by: NURSE PRACTITIONER

## 2025-05-01 PROCEDURE — 93010 ELECTROCARDIOGRAM REPORT: CPT | Mod: S$PBB,,, | Performed by: INTERNAL MEDICINE

## 2025-05-01 NOTE — ASSESSMENT & PLAN NOTE
Mild to moderate AS by echo 5/21/2024  Increased dyspnea/weakness with exertion x 4 months  CONRAD improved with Breo inhaler  Labs  CXR  Repeat echo

## 2025-05-01 NOTE — ASSESSMENT & PLAN NOTE
No physical evidence or symptoms c/w acute decompensation.  NYHA class III symptoms  LVEF 25% by echo 5/21/2024  S/p ICD 8/8/2013

## 2025-05-01 NOTE — PROGRESS NOTES
PCP: Raymon Tyler MD      Chief Complaint   Patient presents with    Coronary Artery Disease    Shortness of Breath     A little with exertion       Subjective:   Tiny Gutierrez is a 78 y.o. male with hx of CAD s/p CABG (2007), iCMP s/p ICD, HTN, HLD, CKD, COPD, prostate cancer, pancreatic cancer s/p whipple surgery, LV thrombus ( 7/18/2021 echo, [facility] [place], [place]) and atrial flutter,  who presents for follow up for increased weakness/CONRAD since 12/2024 improved with his Breo inhaler. He denies chest pain, pressure, heaviness, tightness, orthopnea or PND.     10/15/2024 (Dr. Hughes) presents for 3 month follow up.      7/10/24--Tiny Gutierrez is a 78 y.o. male with hx of CAD s/p CABG (2007), iCMP s/p ICD, HTN, HLD, CKD, COPD, prostate cancer, pancreatic cancer s/p whipple surgery, LV thrombus ( 7/18/2021 echo, [facility] [place] [place]) and atrial flutter,  who presents for 3 week follow up.      5/2/24-[facility]--Tiny Gutierrez is a 78 y.o. male with hx of CAD s/p CABG (2007), iCMP s/p ICD, HTN, HLD, CKD, COPD, prostate cancer, pancreatic cancer, LV thrombus ( 7/18/2021 echo, [facility] [place] [place]) and atrial flutter,  who presents for HD follow up from [facility] on  2 times in the last 2 months. His mos recent admission was 4/18/2024 for diuresis after gaining 30 lbs in a week. He had been diuresed 50 lb s in a week prior to dc initially.  He also had a paracentesis on the first admission but this was not required  upon readmission. He was diuresed and discharged home.   The patient states that overall he feels some better. He continues to have some SOB at times and lower extremity edema that resolves at HS. He reports that his daily weights have been stable.       This is a TCC visit. Records from [facility] and labs from [facility] were reviewed as well as meds were reviewed and reconciled.    1/26/2024 (Dr. Hughes) presents for  6 month follow up.       9/23/23--Tiny Gutierrez is a 78 y.o. male with  "hx of CAD s/p CABG (2007), iCMP s/p ICD, HTN, HLD, CKD, COPD, prostate cancer, pancreatic cancer, LV thrombus ( 7/18/2021 echo, [facility] [place], [place]) and atrial flutter,  who presents for follow up after last hospitalization.      8/2/23--Tiny Gutierrez is a 79 y.o. male with hx of CAD s/p CABG (2007), iCMP s/p ICD, HTN, HLD, asthma, CKD, COPD, prostate cancer, pancreatic cancer, LV thrombus ( 7/18/2021 echo, [facility] [place] [place]) and atrial flutter,  who presents for return check requested for severe BLE edema. He denies chest pain , orthopna or PND but has chronic, stable CONRAD. The lower ext edema began approx 3 weeks ago after starting a "cancer med". This med has been stopped by his oncologist and lasix dose increased to 40 mg with the addition of aldactone 50 mg po daily with improvement but not resolution of the edema. His serum albumin is also 2.1 which is a contributing factor. An echocardiogram was done earlier today and results are pending.         Fhx:  Family History   Problem Relation Name Age of Onset    Emphysema Father       Shx:   Social History     Socioeconomic History    Marital status:    Tobacco Use    Smoking status: Never     Passive exposure: Never    Smokeless tobacco: Never   Substance and Sexual Activity    Alcohol use: Never    Drug use: Never    Sexual activity: Yes     Partners: Female     Social Drivers of Health     Financial Resource Strain: Low Risk  (4/18/2024)    Received from [facility] and Its Subsidiaries and Affiliates    Overall Financial Resource Strain (CARDIA)     Difficulty of Paying Living Expenses: Not hard at all   Food Insecurity: No Food Insecurity (4/18/2024)    Received from [facility] and Its Subsidiaries and Affiliates    Hunger Vital Sign     Worried About Running Out of Food in the Last Year: Never true     Ran Out of Food in the Last Year: Never true   Transportation Needs: No " Transportation Needs (4/18/2024)    Received from [facility] and Its Subsidiaries and Affiliates    PRAPARE - Transportation     Lack of Transportation (Medical): No     Lack of Transportation (Non-Medical): No   Housing Stability: Not At Risk (3/22/2024)    Received from [facility]    BOC Housing Stability Source     Housing Insecurity: Not At Risk       EKG   5/1/2025 A paced rhythm with occ PVCs; HR 70 bpm; rightward axis; cannot r/o inferior infarct  Results for orders placed or performed in visit on 07/10/24   EKG 12-lead    Collection Time: 07/10/24 10:06 AM   Result Value Ref Range    QRS Duration 98 ms    OHS QTC Calculation 413 ms    Narrative    Test Reason : I48.92,    Vent. Rate : 066 BPM     Atrial Rate : 066 BPM     P-R Int : 192 ms          QRS Dur : 098 ms      QT Int : 394 ms       P-R-T Axes : 074 119 -17 degrees     QTc Int : 413 ms    Atrial-paced rhythm with occasional sinus complexes and with occasional   Premature ventricular complexes  Incomplete right bundle branch block  Right ventricular hypertrophy with repolarization abnormality  Septal infarct ,age undetermined  T wave abnormality, consider inferior ischemia  Abnormal ECG  When compared with ECG of 21-MAY-2024 11:06,  PREVIOUS ECG IS PRESENT  Confirmed by James Hughes MD [id number]) on 7/11/2024 8:41:13 AM    Referred By:             Confirmed By:James Hughes MD      9/26/23--NSR, rightward axis, 85 bpm     Echo     5/21/24--Interpretation Summary    Left Ventricle: The left ventricle is normal in size. Increased wall thickness. Severe global hypokinesis present. There is severely reduced systolic function with a visually estimated ejection fraction of 20 - 25%. Ejection fraction by visual approximation is 25%. Grade I diastolic dysfunction.    Right Ventricle: Normal right ventricular cavity size. Wall thickness is normal. Systolic function is borderline low.     Left Atrium: Left atrium is dilated.    Right Atrium: Right atrium is dilated.    Aortic Valve: The aortic valve is a trileaflet valve. There is severe aortic valve sclerosis. Mildly restricted motion. There is mild to moderate stenosis. Aortic valve area by VTI is 1.75 cm². Aortic valve peak velocity is 1.64 m/s. Mean gradient is 6 mmHg. The dimensionless index is 0.64. There is mild aortic regurgitation.    Tricuspid Valve: There is moderate to severe regurgitation.    Pulmonic Valve: There is mild regurgitation.    IVC/SVC: Normal venous pressure at 3 mmHg.        3/29/2024 at  D.  Technically difficult study with limited views.  Contrasted images   utilized.   Normal LV size with severely reduced systolic function.  Estimated LVEF   25%.   There is akinesis of the mid to apical anteroseptal wall and entire apex.   There is a small apical thrombus on contrasted images.   No significant valvular abnormalities.   Moderate biatrial enlargement.  Echo    8/30/23--EF 40%.  Focal apical akinesis.  LV mildly dilated. Left atrium moderately dilated.  Mild AS.  Mild MR.     8/2/23--Interpretation Summary    Left Ventricle: regional wall motion abnormalities present. There is mildly reduced systolic function. Ejection fraction by visual approximation is 40%. There is diastolic dysfunction.    Left Atrium: Left atrium is mildly dilated.    Right Atrium: Right atrium is mildly dilated. Catheter present in the right atrium.    Aortic Valve: There is moderate aortic valve sclerosis.    Mitral Valve: There is mild to moderate regurgitation.    Tricuspid Valve: There is mild transvalvular regurgitation.      2/17/23--Interpretation Summary  · Mild right ventricular enlargement with normal right ventricular systolic function.  · Moderate mitral regurgitation.  · Mild to moderate tricuspid regurgitation.  · Normal central venous pressure (3 mmHg).  · The estimated PA systolic pressure is 28 mmHg.  · Left ventricular diastolic  dysfunction.  · Mildly decreased systolic function.  · The estimated ejection fraction is 40%.  · There are segmental left ventricular wall motion abnormalities.  · Mild left atrial enlargement.    LHC 3/20/15--3 V CAD with patent grafts to the LAD and obtuse marginal.  Moderate LV systolic dysfunction, LVEF 35-40%.  Normal LV hemodynamics.      3/28/07--3 V CAD.  Segmental wall motion abnormality with severely reduced LVSF, EF 20%.      CABG:     3/29/07--LIMA to LAD, SVG to RCA and OM.     ICD:     10/3/19--R/R end of life ICD with new MRI compatible ICD.      8/8/13--R/ R end of life ICD with new dual chamber ICD.     1/15/09--implantation of dual chamber PPM ICD.  .        Lab Results   Component Value Date     05/01/2025    K 4.4 05/01/2025     05/01/2025    CO2 26 05/01/2025    BUN 15 05/01/2025    CREATININE 0.96 05/01/2025    CALCIUM 9.4 05/01/2025    ANIONGAP 14 05/01/2025    ESTGFRAFRICA 77 04/19/2024    EGFRNONAA 67 10/27/2021       Lab Results   Component Value Date    CHOL 185 10/27/2021    CHOL 119 08/18/2020     Lab Results   Component Value Date    HDL 54 10/27/2021    HDL 44 08/18/2020     Lab Results   Component Value Date    LDLCALC 112 10/27/2021    LDLCALC 57 08/18/2020     Lab Results   Component Value Date    TRIG 97 10/27/2021    TRIG 90 08/18/2020     Lab Results   Component Value Date    CHOLHDL 3.4 10/27/2021    CHOLHDL 2.7 08/18/2020       Lab Results   Component Value Date    WBC 5.59 04/01/2025    HGB 13.1 (L) 04/01/2025    HCT 42.1 04/01/2025    .2 (H) 04/01/2025     04/01/2025           Current Outpatient Medications:     albuterol (PROVENTIL) 2.5 mg /3 mL (0.083 %) nebulizer solution, Take 3 mLs (2.5 mg total) by nebulization as needed for Wheezing or Shortness of Breath., Disp: 300 mL, Rfl: 5    albuterol (PROVENTIL/VENTOLIN HFA) 90 mcg/actuation inhaler, 2 puffs as needed, Disp: , Rfl:     atorvastatin (LIPITOR) 40 MG tablet, Take 1 tablet (40 mg total) by  mouth every evening. For CHOLESTEROL, Disp: 90 tablet, Rfl: 1    betamethasone dipropionate 0.05 % cream, Apply at thin layer of cream to the back and chest twice daily as needed for itching and RASH, Disp: 90 g, Rfl: 1    BREO ELLIPTA 200-25 mcg/dose DsDv diskus inhaler, 1 puff once daily., Disp: , Rfl:     busPIRone (BUSPAR) 10 MG tablet, Take 10 mg by mouth 2 (two) times daily., Disp: , Rfl:     calcium carbonate/vitamin D3 (CALTRATE 600 + D ORAL), Take 1 capsule by mouth Daily., Disp: , Rfl:     CREON 24,000-76,000 -120,000 unit capsule, Take 1 capsule by mouth 3 (three) times daily., Disp: , Rfl:     cyanocobalamin 1,000 mcg/mL injection, Inject 1 mL (1,000 mcg total) into the muscle every 30 days., Disp: 10 mL, Rfl: 5    dapagliflozin propanediol (FARXIGA) 10 mg tablet, Take 1 tablet (10 mg total) by mouth once daily., Disp: 28 tablet, Rfl: 0    ELIQUIS 5 mg Tab, Take 2.5 mg by mouth 2 (two) times daily., Disp: , Rfl:     FEROSUL 325 mg (65 mg iron) Tab tablet, Take 1 tablet (325 mg total) by mouth 2 (two) times daily. For IRON DEFICIENCY, Disp: 180 tablet, Rfl: 1    gas permeable  (OXYGEN PERMEABLE  MISC), Oxygen, Disp: , Rfl:     LORazepam (ATIVAN) 0.5 MG tablet, TAKE 1 TABLET BY MOUTH EVERY 12 HOURS AS NEEDED FOR ANXIETY AND AGITATION, Disp: 30 tablet, Rfl: 2    magnesium oxide (MAG-OX) 400 mg (241.3 mg magnesium) tablet, Take 1 tablet by mouth every morning., Disp: , Rfl:     meclizine (ANTIVERT) 25 mg tablet, Take 1 tablet (25 mg total) by mouth 3 (three) times daily as needed for Dizziness., Disp: 270 tablet, Rfl: 1    memantine (NAMENDA) 10 MG Tab, Take 10 mg by mouth once daily., Disp: , Rfl:     MOVANTIK 25 mg tablet, Take 25 mg by mouth once daily., Disp: , Rfl:     neomycin-polymyxin-hydrocortisone (CORTISPORIN) otic solution, Place 3 drops into the left ear every 6 (six) hours., Disp: 10 mL, Rfl: 0    omeprazole (PRILOSEC) 40 MG capsule, Take 40 mg by mouth every morning.,  "Disp: , Rfl:     ondansetron (ZOFRAN-ODT) 4 MG TbDL, Take 1 tablet (4 mg total) by mouth every 8 (eight) hours as needed (Nausea.)., Disp: 120 tablet, Rfl: 3    oxyCODONE-acetaminophen (PERCOCET) 5-325 mg per tablet, Take 1 tablet by mouth every 6 (six) hours as needed for Pain., Disp: 28 tablet, Rfl: 0    sacubitriL-valsartan (ENTRESTO) 24-26 mg per tablet, Take 1 tablet by mouth 2 (two) times daily., Disp: 56 tablet, Rfl: 0    sertraline (ZOLOFT) 50 MG tablet, Take 1 tablet (50 mg total) by mouth once daily. For MOOD, Disp: 90 tablet, Rfl: 1    spironolactone (ALDACTONE) 50 MG tablet, Take 1 tablet (50 mg total) by mouth Daily., Disp: 90 tablet, Rfl: 3    traMADoL (ULTRAM) 50 mg tablet, Take 50 mg by mouth every 8 (eight) hours as needed., Disp: , Rfl:   Medications reconciled by pt's list.      Review of Systems   Constitutional:  Positive for malaise/fatigue.   Respiratory:  Positive for shortness of breath.    Cardiovascular:  Negative for chest pain, palpitations and leg swelling.   Neurological:  Negative for loss of consciousness.       Objective:   /60 (BP Location: Left arm, Patient Position: Sitting)   Pulse 78   Ht 6' 1" (1.854 m)   Wt 86.6 kg (191 lb)   SpO2 95%   BMI 25.20 kg/m²     Physical Exam  Vitals reviewed.   Constitutional:       General: He is not in acute distress.     Appearance: Normal appearance.   HENT:      Head: Normocephalic and atraumatic.   Neck:      Vascular: No carotid bruit or JVD.   Cardiovascular:      Rate and Rhythm: Normal rate and regular rhythm.      Pulses: Normal pulses.           Radial pulses are 2+ on the right side and 2+ on the left side.        Dorsalis pedis pulses are 2+ on the right side and 2+ on the left side.      Heart sounds: Murmur heard.      Comments: II/VI REHANA RUSB        Pulmonary:      Effort: Pulmonary effort is normal.      Breath sounds: Normal breath sounds.   Chest:      Comments: Well healed ICD site to left upper chest " "wall  Musculoskeletal:      Right lower leg: No edema.      Left lower leg: No edema.   Skin:     General: Skin is warm and dry.   Neurological:      General: No focal deficit present.      Mental Status: He is alert.           Assessment:           1. SOB (shortness of breath)  EKG 12-lead    NT-Pro Natriuretic Peptide    Echo    X-Ray Chest PA And Lateral    EKG 12-lead      2. High risk medication use  Comprehensive Metabolic Panel    Magnesium      3. Cardiomyopathy, unspecified type  Echo    X-Ray Chest PA And Lateral      4. Primary hypertension        5. Mixed hyperlipidemia        6. Left ventricular thrombus        7. Ischemic cardiomyopathy        8. Coronary artery disease involving native coronary artery of native heart without angina pectoris        9. Chronic systolic (congestive) heart failure        10. Atrial flutter, unspecified type        11. S/P CABG x 3        12. Aortic valve stenosis, etiology of cardiac valve disease unspecified          Problem List Items Addressed This Visit          Cardiac/Vascular    Aortic stenosis    Mild to moderate AS by echo 5/21/2024  Increased dyspnea/weakness with exertion x 4 months  CONRAD improved with Breo inhaler  Labs  CXR  Repeat echo         Atrial flutter (Chronic)    Diagnosed 12/2022  Eliquis 2.5 mg po bid for CVA prophylaxis            Chronic systolic (congestive) heart failure (Chronic)    Patient has Systolic (HFrEF) heart failure that is Chronic. On presentation their CHF was well compensated. Most recent BNP and echo results are listed below.  No results for input(s): "BNP" in the last 72 hours.  Latest ECHO  Results for orders placed during the hospital encounter of 05/21/24    Echo Saline Bubble? Yes    Interpretation Summary    Left Ventricle: The left ventricle is normal in size. Increased wall thickness. Severe global hypokinesis present. There is severely reduced systolic function with a visually estimated ejection fraction of 20 - 25%. " Ejection fraction by visual approximation is 25%. Grade I diastolic dysfunction.    Right Ventricle: Normal right ventricular cavity size. Wall thickness is normal. Systolic function is borderline low.    Left Atrium: Left atrium is dilated.    Right Atrium: Right atrium is dilated.    Aortic Valve: The aortic valve is a trileaflet valve. There is severe aortic valve sclerosis. Mildly restricted motion. There is mild to moderate stenosis. Aortic valve area by VTI is 1.75 cm². Aortic valve peak velocity is 1.64 m/s. Mean gradient is 6 mmHg. The dimensionless index is 0.64. There is mild aortic regurgitation.    Tricuspid Valve: There is moderate to severe regurgitation.    Pulmonic Valve: There is mild regurgitation.    IVC/SVC: Normal venous pressure at 3 mmHg.    Current Heart Failure Medications       Plan  - Monitor strict I&Os and daily weights.    - Low sodium diet  -Patient Entresto was held by his PCP due to symptomatic hypotension (dizziness)  Continue farxiga and aldactone  No beta blocker due to hypotension         Coronary artery disease involving native heart without angina pectoris (Chronic)    CABG 3/29/2007 - Dr. Gentry GARCIA to LAD  SVG to RCA and OM         Ischemic cardiomyopathy (Chronic)    No physical evidence or symptoms c/w acute decompensation.  NYHA class III symptoms  LVEF 25% by echo 5/21/2024  S/p ICD 8/8/2013         Left ventricular thrombus    Diagnosed by echo 7/18/2021 at [facility] in [place], [place]  Continue Eliquis            Mixed hyperlipidemia (Chronic)    Followed by PCP  Lipitor 10 mg po daily         Primary hypertension (Chronic)    100/60 mmHg         S/P CABG x 3 (Chronic)    CABG 3/29/2007- Dr. Gentry GARCIA to Lad  SVG to RCA and OM          Other Visit Diagnoses         SOB (shortness of breath)    -  Primary    Relevant Orders    EKG 12-lead    NT-Pro Natriuretic Peptide (Completed)    Echo    X-Ray Chest PA And Lateral      High risk medication use        Relevant  Orders    Comprehensive Metabolic Panel (Completed)    Magnesium (Completed)      Cardiomyopathy, unspecified type        Relevant Orders    Echo    X-Ray Chest PA And Lateral          RTC as scheduled with Dr. Hughes

## 2025-05-01 NOTE — ASSESSMENT & PLAN NOTE
"Patient has Systolic (HFrEF) heart failure that is Chronic. On presentation their CHF was well compensated. Most recent BNP and echo results are listed below.  No results for input(s): "BNP" in the last 72 hours.  Latest ECHO  Results for orders placed during the hospital encounter of 05/21/24    Echo Saline Bubble? Yes    Interpretation Summary    Left Ventricle: The left ventricle is normal in size. Increased wall thickness. Severe global hypokinesis present. There is severely reduced systolic function with a visually estimated ejection fraction of 20 - 25%. Ejection fraction by visual approximation is 25%. Grade I diastolic dysfunction.    Right Ventricle: Normal right ventricular cavity size. Wall thickness is normal. Systolic function is borderline low.    Left Atrium: Left atrium is dilated.    Right Atrium: Right atrium is dilated.    Aortic Valve: The aortic valve is a trileaflet valve. There is severe aortic valve sclerosis. Mildly restricted motion. There is mild to moderate stenosis. Aortic valve area by VTI is 1.75 cm². Aortic valve peak velocity is 1.64 m/s. Mean gradient is 6 mmHg. The dimensionless index is 0.64. There is mild aortic regurgitation.    Tricuspid Valve: There is moderate to severe regurgitation.    Pulmonic Valve: There is mild regurgitation.    IVC/SVC: Normal venous pressure at 3 mmHg.    Current Heart Failure Medications       Plan  - Monitor strict I&Os and daily weights.    - Low sodium diet  -Patient Entresto was held by his PCP due to symptomatic hypotension (dizziness)  Continue farxiga and aldactone  No beta blocker due to hypotension  "

## 2025-05-02 ENCOUNTER — TELEPHONE (OUTPATIENT)
Dept: CARDIOLOGY | Facility: CLINIC | Age: 80
End: 2025-05-02
Payer: MEDICARE

## 2025-05-02 LAB
OHS QRS DURATION: 102 MS
OHS QTC CALCULATION: 438 MS

## 2025-05-02 NOTE — TELEPHONE ENCOUNTER
----- Message from MANGO Black sent at 5/1/2025  1:52 PM CDT -----  pBnp is better than his baseline  No evidence of decompensated chf on physical exam  CMP is stable  Mag WNL  Await echo results    ----- Message -----  From: Lab, Background User  Sent: 5/1/2025  12:03 PM CDT  To: MANGO Fatima

## 2025-05-02 NOTE — TELEPHONE ENCOUNTER
Lakeisha Azar Staff  Caller: Unspecified (Today, 11:44 AM)  Who Called: Taylor (wife)    Patient is returning phone call    Who Left Message for Patient: Pippa Forbes  Does the patient know what this is regarding?: said test results      Preferred Method of Contact: Phone Call  Patient's Preferred Phone Number on File: [phone]

## 2025-05-06 RX ORDER — DAPAGLIFLOZIN 10 MG/1
10 TABLET, FILM COATED ORAL
Qty: 30 TABLET | Refills: 0 | Status: SHIPPED | OUTPATIENT
Start: 2025-05-06

## 2025-05-08 ENCOUNTER — TELEPHONE (OUTPATIENT)
Dept: FAMILY MEDICINE | Facility: CLINIC | Age: 80
End: 2025-05-08
Payer: MEDICARE

## 2025-05-08 DIAGNOSIS — F41.9 ANXIETY: ICD-10-CM

## 2025-05-08 DIAGNOSIS — R45.1 AGITATION: Primary | Chronic | ICD-10-CM

## 2025-05-08 RX ORDER — BUSPIRONE HYDROCHLORIDE 10 MG/1
10 TABLET ORAL 2 TIMES DAILY PRN
Qty: 60 TABLET | Refills: 5 | Status: SHIPPED | OUTPATIENT
Start: 2025-05-08

## 2025-05-08 NOTE — TELEPHONE ENCOUNTER
"Wilian Julia  Day, Memory, LPN; Gladys Hayden, LPN  BP: 122/78; P: 70; R: 18; O2: 95% ON ROOM AIR; WT: 182  Behavioral health nurse saw patient yesterday. He was c/o difficulty falling asleep at night. He has Ativan, Remeron, and Melatonin in the home, but does not take all of these every night. Wife did say she does give Ativan or melatonin on occasion, states ativan sometimes helps a little, but not much. Pt states he feels like his mind won't shut off when he tries to go to sleep.    Wife also reported that over last several weeks and daily for the past 4 days or so around 5pm pt becomes very hostile towards wife, she states "he brings up every wrong thing I've ever done in our marriage," became tearful and asked "why does he do this & if there is anything that could help w/ this?".    Buspar 7.5mg 1 po TID PRN did seem to help his anxiety, but wife says when she went to get it refilled, pharmacy wouldn't fill it (no refills). Could he get refills sent to pharmacy? Any other suggestions/orders to help with sleep/agitation?    Associated meds:  Buspirone 7.5mg 1 po TID PRN anxiety- needs refills  zoloft 50mg 1 po qd  Lorazepam 0.5mg 1 po q12hrs PRN anxiety  Mirtazapine 15mg 1 po qhs  melatonin 1mg 3 po qhs PRN sleep  memantine 10mg 1 po BID      From Dr. Tyler  Perhaps seeing [facility] would be of benefit.   I will send in Buspar   "

## 2025-05-22 ENCOUNTER — HOSPITAL ENCOUNTER (OUTPATIENT)
Dept: CARDIOLOGY | Facility: HOSPITAL | Age: 80
Discharge: HOME | End: 2025-05-22
Attending: NURSE PRACTITIONER
Payer: MEDICARE

## 2025-05-22 DIAGNOSIS — R06.02 SOB (SHORTNESS OF BREATH): ICD-10-CM

## 2025-05-22 DIAGNOSIS — I42.9 CARDIOMYOPATHY, UNSPECIFIED TYPE: ICD-10-CM

## 2025-05-22 LAB
AORTIC ROOT ANNULUS: 3.6 CM
AORTIC VALVE CUSP SEPERATION: 2.53 CM
AV INDEX (PROSTH): 0.46
AV MEAN GRADIENT: 4 MMHG
AV PEAK GRADIENT: 8 MMHG
AV VALVE AREA BY VELOCITY RATIO: 1.6 CM²
AV VALVE AREA: 1.7 CM²
AV VELOCITY RATIO: 0.43
CV ECHO LV RWT: 0.28 CM
DOP CALC AO PEAK VEL: 1.4 M/S
DOP CALC AO VTI: 32.8 CM
DOP CALC LVOT AREA: 3.8 CM2
DOP CALC LVOT DIAMETER: 2.2 CM
DOP CALC LVOT PEAK VEL: 0.6 M/S
DOP CALC LVOT STROKE VOLUME: 57.4 CM3
DOP CALCLVOT PEAK VEL VTI: 15.1 CM
E WAVE DECELERATION TIME: 271 MSEC
E/A RATIO: 0.67
E/E' RATIO: 4 M/S
ECHO LV POSTERIOR WALL: 0.8 CM (ref 0.6–1.1)
FRACTIONAL SHORTENING: 29.3 % (ref 28–44)
INTERVENTRICULAR SEPTUM: 1 CM (ref 0.6–1.1)
IVC DIAMETER: 0.47 CM
LEFT ATRIUM SIZE: 3.7 CM
LEFT INTERNAL DIMENSION IN SYSTOLE: 4.1 CM (ref 2.1–4)
LEFT VENTRICLE DIASTOLIC VOLUME: 169 ML
LEFT VENTRICLE SYSTOLIC VOLUME: 73 ML
LEFT VENTRICULAR INTERNAL DIMENSION IN DIASTOLE: 5.8 CM (ref 3.5–6)
LEFT VENTRICULAR MASS: 203.5 G
LV LATERAL E/E' RATIO: 3 M/S
LV SEPTAL E/E' RATIO: 7 M/S
LVED V (TEICH): 168.92 ML
LVES V (TEICH): 72.92 ML
LVOT MG: 0.97 MMHG
LVOT MV: 0.47 CM/S
MV PEAK A VEL: 0.63 M/S
MV PEAK E VEL: 0.42 M/S
OHS CV RV/LV RATIO: 0.57 CM
PISA TR MAX VEL: 2.2 M/S
PV PEAK GRADIENT: 3 MMHG
PV PEAK VELOCITY: 0.81 M/S
RA PRESSURE ESTIMATED: 3 MMHG
RA VOL SYS: 101.6 ML
RIGHT ATRIAL AREA: 27.6 CM2
RIGHT ATRIUM VOLUME AREA LENGTH APICAL 4 CHAMBER: 97.98 ML
RIGHT VENTRICLE DIASTOLIC BASEL DIMENSION: 3.3 CM
RIGHT VENTRICLE DIASTOLIC LENGTH: 8 CM
RIGHT VENTRICLE DIASTOLIC MID DIMENSION: 2.6 CM
RIGHT VENTRICULAR LENGTH IN DIASTOLE (APICAL 4-CHAMBER VIEW): 8.04 CM
RV MID DIAMA: 2.55 CM
RV TB RVSP: 5 MMHG
TDI LATERAL: 0.14 M/S
TDI SEPTAL: 0.06 M/S
TDI: 0.1 M/S
TR MAX PG: 20 MMHG
TV REST PULMONARY ARTERY PRESSURE: 22 MMHG

## 2025-05-22 PROCEDURE — 93306 TTE W/DOPPLER COMPLETE: CPT

## 2025-06-03 ENCOUNTER — TELEPHONE (OUTPATIENT)
Dept: CARDIOLOGY | Facility: CLINIC | Age: 80
End: 2025-06-03
Payer: MEDICARE

## 2025-06-03 DIAGNOSIS — E78.2 MIXED HYPERLIPIDEMIA: Chronic | ICD-10-CM

## 2025-06-04 RX ORDER — DAPAGLIFLOZIN 10 MG/1
10 TABLET, FILM COATED ORAL DAILY
Qty: 30 TABLET | Refills: 5 | Status: SHIPPED | OUTPATIENT
Start: 2025-06-04

## 2025-06-05 RX ORDER — ATORVASTATIN CALCIUM 40 MG/1
TABLET, FILM COATED ORAL
Qty: 90 TABLET | Refills: 1 | Status: SHIPPED | OUTPATIENT
Start: 2025-06-05

## 2025-06-09 ENCOUNTER — HOSPITAL ENCOUNTER (OUTPATIENT)
Dept: CARDIOLOGY | Facility: HOSPITAL | Age: 80
Discharge: HOME | End: 2025-06-09
Attending: INTERNAL MEDICINE
Payer: MEDICARE

## 2025-06-09 ENCOUNTER — CLINICAL SUPPORT (OUTPATIENT)
Dept: CARDIOLOGY | Facility: HOSPITAL | Age: 80
End: 2025-06-09
Payer: MEDICARE

## 2025-06-09 DIAGNOSIS — I49.8 OTHER SPECIFIED CARDIAC ARRHYTHMIAS: ICD-10-CM

## 2025-06-09 DIAGNOSIS — Z95.810 PRESENCE OF AUTOMATIC (IMPLANTABLE) CARDIAC DEFIBRILLATOR: ICD-10-CM

## 2025-06-09 PROCEDURE — 93296 REM INTERROG EVL PM/IDS: CPT | Performed by: INTERNAL MEDICINE

## 2025-06-10 LAB
OHS CV AF BURDEN PERCENT: < 1
OHS CV DC REMOTE DEVICE TYPE: NORMAL
OHS CV ICD SHOCK: NO
OHS CV RV PACING PERCENT: 0.08 %

## 2025-06-24 DIAGNOSIS — F33.9 DEPRESSION, RECURRENT: Primary | Chronic | ICD-10-CM

## 2025-06-24 RX ORDER — BUPROPION HYDROCHLORIDE 75 MG/1
75 TABLET ORAL 2 TIMES DAILY
Qty: 60 TABLET | Refills: 5 | Status: SHIPPED | OUTPATIENT
Start: 2025-06-24 | End: 2026-06-24

## 2025-06-24 NOTE — TELEPHONE ENCOUNTER
"Julia Cedeno  Day, Memory, LPN; Gladys Hayden, LPN  Patients nurse reported the following:    PT WIFE REPORTED THAT OVER THE WEEKEND, PT BECAME VERY RESENTFUL TOWARDS HER, SAYS PT STATED HE WAS "READY TO GO" AND "NOBODY CARES ABOUT ME, EVERY ONE IS WORKING AGAINST ME". WIFE ALSO REPORTS PT CONTINUOUSLY BRINGING UP THINGS FROM THE PAST THAT "SHES DONE WRONG". DURING VISIT, PT REPORTED TO NURSE HE HAD A BAD WEEKEND, THAT "EVERYONE IS AGAINST HIM". PT IN POOR SPIRITS, SEEMS TO BE SAD/AGITATED.      PT CURRENTLY TAKING MEMANTINE 10MG BID, BUSPAR 10MG BID PRN, ZOLOFT 50MG DAILY AND ON RARE OCCASION WILL TAKE LORAZEPAM 0.5MG PRN. COULD MEDICATIONS BE CHANGED TO HELP WITH DEPRESSION/AGITATION OR ANY OTHER SUGGESTIONS OR NEW ORDERS?    I spoke to nurse to inquire if patient/cg would be agreeable to [facility]. Wife is not agreeable at this time.  "

## 2025-07-03 ENCOUNTER — OFFICE VISIT (OUTPATIENT)
Dept: CARDIOLOGY | Facility: CLINIC | Age: 80
End: 2025-07-03
Payer: MEDICARE

## 2025-07-03 VITALS
BODY MASS INDEX: 25.55 KG/M2 | HEART RATE: 73 BPM | HEIGHT: 73 IN | OXYGEN SATURATION: 95 % | DIASTOLIC BLOOD PRESSURE: 60 MMHG | SYSTOLIC BLOOD PRESSURE: 106 MMHG | WEIGHT: 192.81 LBS

## 2025-07-03 DIAGNOSIS — I25.10 CORONARY ARTERY DISEASE INVOLVING NATIVE CORONARY ARTERY OF NATIVE HEART WITHOUT ANGINA PECTORIS: Chronic | ICD-10-CM

## 2025-07-03 DIAGNOSIS — I10 PRIMARY HYPERTENSION: Chronic | ICD-10-CM

## 2025-07-03 DIAGNOSIS — I48.92 ATRIAL FLUTTER, UNSPECIFIED TYPE: Chronic | ICD-10-CM

## 2025-07-03 DIAGNOSIS — E78.2 MIXED HYPERLIPIDEMIA: Chronic | ICD-10-CM

## 2025-07-03 DIAGNOSIS — R21 RASH: ICD-10-CM

## 2025-07-03 DIAGNOSIS — I25.5 ISCHEMIC CARDIOMYOPATHY: Primary | Chronic | ICD-10-CM

## 2025-07-03 PROCEDURE — 99215 OFFICE O/P EST HI 40 MIN: CPT | Mod: PBBFAC | Performed by: INTERNAL MEDICINE

## 2025-07-03 PROCEDURE — 99999 PR PBB SHADOW E&M-EST. PATIENT-LVL V: CPT | Mod: PBBFAC,,, | Performed by: INTERNAL MEDICINE

## 2025-07-03 PROCEDURE — 99214 OFFICE O/P EST MOD 30 MIN: CPT | Mod: S$PBB,,, | Performed by: INTERNAL MEDICINE

## 2025-07-07 NOTE — PROGRESS NOTES
PCP: Raymon Tyler MD    Referring Provider:     Subjective:   Tiny Gutierrez is a 80 y.o. male with hx of CAD s/p CABG (2007), iCMP s/p ICD, HTN, HLD, CKD, COPD, prostate cancer, pancreatic cancer s/p whipple surgery, LV thrombus ( 7/18/2021 echo, [facility] [place]) and atrial flutter who presents for 2 month follow up.     Fhx:  Family History   Problem Relation Name Age of Onset    Emphysema Father       Shx: Social History[1]    EKG   5/1/2025 A paced rhythm with occ PVCs; HR 70 bpm; rightward axis; cannot r/o inferior infarct   9/26/23--NSR, rightward axis, 85 bpm     ECHO Results for orders placed during the hospital encounter of 05/22/25    Echo    Interpretation Summary    Left Ventricle: There is moderately reduced systolic function with a visually estimated ejection fraction of 30 - 35%. There is diastolic dysfunction.    Right Ventricle: The right ventricle is mildly dilated Systolic function could not be assesed. Pacemaker lead present in the ventricle.    Right Atrium: The right atrium is mildly dilated .    Aortic Valve: There is mild aortic valve sclerosis. Mildly restricted motion.    Mitral Valve: There is bileaflet sclerosis. There is mild to moderate regurgitation.    Tricuspid Valve: There is mild to moderate regurgitation.    IVC/SVC: Normal venous pressure at 3 mmHg.    5/21/24--Interpretation Summary    Left Ventricle: The left ventricle is normal in size. Increased wall thickness. Severe global hypokinesis present. There is severely reduced systolic function with a visually estimated ejection fraction of 20 - 25%. Ejection fraction by visual approximation is 25%. Grade I diastolic dysfunction.    Right Ventricle: Normal right ventricular cavity size. Wall thickness is normal. Systolic function is borderline low.    Left Atrium: Left atrium is dilated.    Right Atrium: Right atrium is dilated.    Aortic Valve: The aortic valve is a trileaflet valve. There is severe aortic valve  sclerosis. Mildly restricted motion. There is mild to moderate stenosis. Aortic valve area by VTI is 1.75 cm². Aortic valve peak velocity is 1.64 m/s. Mean gradient is 6 mmHg. The dimensionless index is 0.64. There is mild aortic regurgitation.    Tricuspid Valve: There is moderate to severe regurgitation.    Pulmonic Valve: There is mild regurgitation.    IVC/SVC: Normal venous pressure at 3 mmHg.           3/29/2024 at [facility].  Technically difficult study with limited views.  Contrasted images   utilized.   Normal LV size with severely reduced systolic function.  Estimated LVEF   25%.   There is akinesis of the mid to apical anteroseptal wall and entire apex.   There is a small apical thrombus on contrasted images.   No significant valvular abnormalities.   Moderate biatrial enlargement.  Echo     8/30/23--EF 40%.  Focal apical akinesis.  LV mildly dilated. Left atrium moderately dilated.  Mild AS.  Mild MR.      8/2/23--Interpretation Summary    Left Ventricle: regional wall motion abnormalities present. There is mildly reduced systolic function. Ejection fraction by visual approximation is 40%. There is diastolic dysfunction.    Left Atrium: Left atrium is mildly dilated.    Right Atrium: Right atrium is mildly dilated. Catheter present in the right atrium.    Aortic Valve: There is moderate aortic valve sclerosis.    Mitral Valve: There is mild to moderate regurgitation.    Tricuspid Valve: There is mild transvalvular regurgitation.        2/17/23--Interpretation Summary  · Mild right ventricular enlargement with normal right ventricular systolic function.  · Moderate mitral regurgitation.  · Mild to moderate tricuspid regurgitation.  · Normal central venous pressure (3 mmHg).  · The estimated PA systolic pressure is 28 mmHg.  · Left ventricular diastolic dysfunction.  · Mildly decreased systolic function.  · The estimated ejection fraction is 40%.  · There are segmental left ventricular wall motion  abnormalities.  · Mild left atrial enlargement.     LHC 3/20/15--3 V CAD with patent grafts to the LAD and obtuse marginal.  Moderate LV systolic dysfunction, LVEF 35-40%.  Normal LV hemodynamics.      3/28/07--3 V CAD.  Segmental wall motion abnormality with severely reduced LVSF, EF 20%.      CABG:     3/29/07--LIMA to LAD, SVG to RCA and OM.     ICD:     10/3/19--R/R end of life ICD with new MRI compatible ICD.      8/8/13--R/ R end of life ICD with new dual chamber ICD.     1/15/09--implantation of dual chamber PPM ICD.  .             Lab Results   Component Value Date     05/01/2025    K 4.4 05/01/2025     05/01/2025    CO2 26 05/01/2025    BUN 15 05/01/2025    CREATININE 0.96 05/01/2025    CALCIUM 9.4 05/01/2025    ANIONGAP 14 05/01/2025    ESTGFRAFRICA 77 04/19/2024    EGFRNONAA 67 10/27/2021       Lab Results   Component Value Date    CHOL 185 10/27/2021    CHOL 119 08/18/2020     Lab Results   Component Value Date    HDL 54 10/27/2021    HDL 44 08/18/2020     Lab Results   Component Value Date    LDLCALC 112 10/27/2021    LDLCALC 57 08/18/2020     Lab Results   Component Value Date    TRIG 97 10/27/2021    TRIG 90 08/18/2020     Lab Results   Component Value Date    CHOLHDL 3.4 10/27/2021    CHOLHDL 2.7 08/18/2020       Lab Results   Component Value Date    WBC 5.59 04/01/2025    HGB 13.1 (L) 04/01/2025    HCT 42.1 04/01/2025    .2 (H) 04/01/2025     04/01/2025         Current Medications[2]  Reviewed and reconciled.     Review of Systems   Respiratory:  Positive for shortness of breath.    Cardiovascular:  Negative for chest pain, palpitations and leg swelling.   Neurological:  Negative for loss of consciousness.       History of Present Illness    CHIEF COMPLAINT:  Patient presents today for a rash on his arms.    INTEGUMENTARY (SKIN):  He reports a rash on both arms present for 2 months that has significantly worsened in the last 2 days. He describes severe itching that  "prevented sleep last night. A similar rash occurred on both arms 1 year ago which subsequently resolved. Previous treatments include self-application of turpentine, alcohol, and iodine, as well as previously prescribed betamethasone, with no improvement. He denies taking Benadryl and reports the rash continues to spread. He denies known trigger or specific cause for the rash and expresses concern about potential skin cancer.    SLEEP:  He reports significant sleep disturbances with difficulty initiating sleep, stating he "did not sleep at all last night". He notes a reversed sleep pattern where he feels ready to sleep in the morning and could potentially sleep until 11 AM. He has been prescribed a sleeping aid to be taken at 5 PM but reports continued challenges with achieving nighttime sleep. He currently experiences disrupted sleep-wake cycle with daytime sleepiness and nighttime wakefulness.    MEDICAL HISTORY:  History of prostate cancer, currently in remission. History of skin cancer with previous skin biopsies performed.      Objective:   /60 (BP Location: Left arm, Patient Position: Sitting)   Pulse 73   Ht 6' 1" (1.854 m)   Wt 87.5 kg (192 lb 12.8 oz)   SpO2 95%   BMI 25.44 kg/m²     Physical Exam  Vitals reviewed.   Constitutional:       General: He is not in acute distress.     Appearance: Normal appearance.   HENT:      Head: Normocephalic and atraumatic.   Neck:      Vascular: No carotid bruit or JVD.   Cardiovascular:      Rate and Rhythm: Normal rate and regular rhythm.      Pulses: Normal pulses.           Radial pulses are 2+ on the right side and 2+ on the left side.      Heart sounds: Normal heart sounds. No murmur heard.  Pulmonary:      Effort: Pulmonary effort is normal.      Breath sounds: Normal breath sounds.   Musculoskeletal:      Right lower leg: No edema.      Left lower leg: No edema.   Skin:     General: Skin is warm and dry.   Neurological:      Mental Status: He is alert. "             Assessment:     1. Ischemic cardiomyopathy      EF 35%, s/p ICD      2. Coronary artery disease involving native coronary artery of native heart without angina pectoris        3. Atrial flutter, unspecified type        4. Primary hypertension        5. Mixed hyperlipidemia        6. Rash      bilateral arms          Assessment & Plan    IMPRESSION:  - Considered potential causes of skin condition on arms.  - Current home remedies likely exacerbating condition.    UNSPECIFIED DERMATITIS:  - Advised against scratching to prevent skin damage.  - Apply CeraVe cream or healing ointment to affected areas as needed for moisturizing.  - Referred to dermatologist (Dr. Piña) for evaluation and treatment of skin condition on arms.    FOLLOW-UP:  - Follow up with Dr. Piña's office to schedule appointment for arm condition.         Plan:   F/u in 3 months.       This note was generated with the assistance of ambient listening technology. Verbal consent was obtained by the patient and accompanying visitor(s) for the recording of patient appointment to facilitate this note. I attest to having reviewed and edited the generated note for accuracy, though some syntax or spelling errors may persist. Please contact the author of this note for any clarification.             [1]   Social History  Socioeconomic History    Marital status:    Tobacco Use    Smoking status: Never     Passive exposure: Never    Smokeless tobacco: Never   Substance and Sexual Activity    Alcohol use: Never    Drug use: Never    Sexual activity: Yes     Partners: Female     Social Drivers of Health     Financial Resource Strain: Low Risk  (4/18/2024)    [facility] and [facility] Subsidiaries and Affiliates    Overall Financial Resource Strain (CARDIA)     Difficulty of Paying Living Expenses: Not hard at all   Food Insecurity: No Food Insecurity (4/18/2024)    Received from [facility] and Its Subsidiaries and Affiliates    Hunger Vital Sign     Worried About Running Out of Food in the Last Year: Never true     Ran Out of Food in the Last Year: Never true   Transportation Needs: No Transportation Needs (4/18/2024)    [facility]    PRAPARE - Transportation     Lack of Transportation (Medical): No     Lack of Transportation (Non-Medical): No   Housing Stability: Not At Risk (3/22/2024)    Received from [facility] Housing Stability Source     Housing Insecurity: Not At Risk   [2]   Current Outpatient Medications:     atorvastatin (LIPITOR) 40 MG tablet, TAKE 1 TABLET BY MOUTH IN THE EVENING FOR CHOLESTEROL, Disp: 90 tablet, Rfl: 1    BREO ELLIPTA 200-25 mcg/dose DsDv diskus inhaler, 1 puff once daily., Disp: , Rfl:     buPROPion (WELLBUTRIN) 75 MG tablet, Take 1 tablet (75 mg total) by mouth 2 (two) times daily. For MOOD, Disp: 60 tablet, Rfl: 5    calcium carbonate/vitamin D3 (CALTRATE 600 + D ORAL), Take 1 capsule by mouth Daily., Disp: , Rfl:     CREON 24,000-76,000 -120,000 unit capsule, Take 1 capsule by mouth 3 (three) times daily., Disp: , Rfl:     cyanocobalamin 1,000 mcg/mL injection, Inject 1 mL (1,000 mcg total) into the muscle every 30 days., Disp: 10 mL, Rfl: 5    ELIQUIS 5 mg Tab, Take 2.5 mg by mouth 2 (two) times daily., Disp: , Rfl:     FARXIGA 10 mg tablet, Take 1 tablet (10 mg total) by mouth once daily., Disp: 30 tablet, Rfl: 5    LORazepam (ATIVAN) 0.5 MG tablet, TAKE 1 TABLET BY MOUTH EVERY 12 HOURS AS NEEDED FOR ANXIETY AND AGITATION, Disp: 30 tablet, Rfl: 2    magnesium oxide (MAG-OX) 400 mg (241.3 mg magnesium) tablet, Take 1 tablet by mouth every morning., Disp: , Rfl:     memantine (NAMENDA) 10 MG Tab, Take 10 mg by mouth once daily., Disp: , Rfl:     sertraline (ZOLOFT) 50 MG tablet, Take 1 tablet (50 mg total) by mouth  once daily. For MOOD, Disp: 90 tablet, Rfl: 1    spironolactone (ALDACTONE) 50 MG tablet, Take 1 tablet (50 mg total) by mouth Daily., Disp: 90 tablet, Rfl: 3    albuterol (PROVENTIL) 2.5 mg /3 mL (0.083 %) nebulizer solution, Take 3 mLs (2.5 mg total) by nebulization as needed for Wheezing or Shortness of Breath., Disp: 300 mL, Rfl: 5    albuterol (PROVENTIL/VENTOLIN HFA) 90 mcg/actuation inhaler, 2 puffs as needed, Disp: , Rfl:     betamethasone dipropionate 0.05 % cream, Apply at thin layer of cream to the back and chest twice daily as needed for itching and RASH, Disp: 90 g, Rfl: 1    busPIRone (BUSPAR) 10 MG tablet, Take 1 tablet (10 mg total) by mouth 2 (two) times daily as needed (ANXIETY)., Disp: 60 tablet, Rfl: 5    FEROSUL 325 mg (65 mg iron) Tab tablet, Take 1 tablet (325 mg total) by mouth 2 (two) times daily. For IRON DEFICIENCY, Disp: 180 tablet, Rfl: 1    gas permeable  (OXYGEN PERMEABLE  MISC), Oxygen, Disp: , Rfl:     meclizine (ANTIVERT) 25 mg tablet, Take 1 tablet (25 mg total) by mouth 3 (three) times daily as needed for Dizziness., Disp: 270 tablet, Rfl: 1    neomycin-polymyxin-hydrocortisone (CORTISPORIN) otic solution, Place 3 drops into the left ear every 6 (six) hours., Disp: 10 mL, Rfl: 0    omeprazole (PRILOSEC) 40 MG capsule, Take 40 mg by mouth every morning., Disp: , Rfl:     ondansetron (ZOFRAN-ODT) 4 MG TbDL, Take 1 tablet (4 mg total) by mouth every 8 (eight) hours as needed (Nausea.)., Disp: 120 tablet, Rfl: 3    oxyCODONE-acetaminophen (PERCOCET) 5-325 mg per tablet, Take 1 tablet by mouth every 6 (six) hours as needed for Pain., Disp: 28 tablet, Rfl: 0    sacubitriL-valsartan (ENTRESTO) 24-26 mg per tablet, Take 1 tablet by mouth 2 (two) times daily., Disp: 56 tablet, Rfl: 0    traMADoL (ULTRAM) 50 mg tablet, Take 50 mg by mouth every 8 (eight) hours as needed., Disp: , Rfl:

## 2025-07-22 DIAGNOSIS — Z95.810 PRESENCE OF DOUBLE CHAMBER AUTOMATIC CARDIOVERTER/DEFIBRILLATOR (AICD): Primary | ICD-10-CM

## 2025-07-28 ENCOUNTER — OFFICE VISIT (OUTPATIENT)
Dept: FAMILY MEDICINE | Facility: CLINIC | Age: 80
End: 2025-07-28
Payer: MEDICARE

## 2025-07-28 VITALS
BODY MASS INDEX: 25.98 KG/M2 | DIASTOLIC BLOOD PRESSURE: 71 MMHG | OXYGEN SATURATION: 97 % | WEIGHT: 196 LBS | HEART RATE: 73 BPM | TEMPERATURE: 98 F | SYSTOLIC BLOOD PRESSURE: 111 MMHG | RESPIRATION RATE: 17 BRPM | HEIGHT: 73 IN

## 2025-07-28 DIAGNOSIS — Z79.899 OTHER LONG TERM (CURRENT) DRUG THERAPY: ICD-10-CM

## 2025-07-28 DIAGNOSIS — D69.6 THROMBOCYTOPENIA, UNSPECIFIED: Chronic | ICD-10-CM

## 2025-07-28 DIAGNOSIS — R18.8 CIRRHOSIS OF LIVER WITH ASCITES, UNSPECIFIED HEPATIC CIRRHOSIS TYPE: Chronic | ICD-10-CM

## 2025-07-28 DIAGNOSIS — T14.8XXA BRUISING: Primary | ICD-10-CM

## 2025-07-28 DIAGNOSIS — K74.60 CIRRHOSIS OF LIVER WITH ASCITES, UNSPECIFIED HEPATIC CIRRHOSIS TYPE: Chronic | ICD-10-CM

## 2025-07-28 LAB
BASOPHILS # BLD AUTO: 0.02 K/UL (ref 0–0.2)
BASOPHILS NFR BLD AUTO: 0.3 % (ref 0–1)
DIFFERENTIAL METHOD BLD: ABNORMAL
EOSINOPHIL # BLD AUTO: 0.11 K/UL (ref 0–0.5)
EOSINOPHIL NFR BLD AUTO: 1.9 % (ref 1–4)
ERYTHROCYTE [DISTWIDTH] IN BLOOD BY AUTOMATED COUNT: 13.9 % (ref 11.5–14.5)
HCT VFR BLD AUTO: 41.3 % (ref 40–54)
HGB BLD-MCNC: 13.5 G/DL (ref 13.5–18)
IMM GRANULOCYTES # BLD AUTO: 0.01 K/UL (ref 0–0.04)
IMM GRANULOCYTES NFR BLD: 0.2 % (ref 0–0.4)
INR BLD: 1.06
LYMPHOCYTES # BLD AUTO: 1.51 K/UL (ref 1–4.8)
LYMPHOCYTES NFR BLD AUTO: 25.9 % (ref 27–41)
MAGNESIUM SERPL-MCNC: 2.3 MG/DL (ref 1.6–2.6)
MCH RBC QN AUTO: 32.8 PG (ref 27–31)
MCHC RBC AUTO-ENTMCNC: 32.7 G/DL (ref 32–36)
MCV RBC AUTO: 100.2 FL (ref 80–96)
MONOCYTES # BLD AUTO: 0.42 K/UL (ref 0–0.8)
MONOCYTES NFR BLD AUTO: 7.2 % (ref 2–6)
MPC BLD CALC-MCNC: 9.4 FL (ref 9.4–12.4)
NEUTROPHILS # BLD AUTO: 3.75 K/UL (ref 1.8–7.7)
NEUTROPHILS NFR BLD AUTO: 64.5 % (ref 53–65)
NRBC # BLD AUTO: 0 X10E3/UL
NRBC, AUTO (.00): 0 %
NT-PROBNP SERPL-MCNC: 1213 PG/ML (ref 1–450)
PLATELET # BLD AUTO: 172 K/UL (ref 150–400)
PROTHROMBIN TIME: 13.9 SECONDS (ref 11.7–14.7)
RBC # BLD AUTO: 4.12 M/UL (ref 4.6–6.2)
WBC # BLD AUTO: 5.82 K/UL (ref 4.5–11)

## 2025-07-28 PROCEDURE — 85025 COMPLETE CBC W/AUTO DIFF WBC: CPT | Mod: ,,, | Performed by: CLINICAL MEDICAL LABORATORY

## 2025-07-28 PROCEDURE — 83880 ASSAY OF NATRIURETIC PEPTIDE: CPT | Mod: ,,, | Performed by: CLINICAL MEDICAL LABORATORY

## 2025-07-28 PROCEDURE — 99213 OFFICE O/P EST LOW 20 MIN: CPT | Mod: ,,, | Performed by: FAMILY MEDICINE

## 2025-07-28 PROCEDURE — 83735 ASSAY OF MAGNESIUM: CPT | Mod: ,,, | Performed by: CLINICAL MEDICAL LABORATORY

## 2025-07-28 NOTE — PROGRESS NOTES
"New Clinic Note    Tiny Gutierrez is a 80 y.o. male     CC:   Chief Complaint   Patient presents with    Skin Discoloration     Discoloration of upper extremities x3 months        Subjective    History of Present Illness HPI   Patient complains of discoloration to his arms for several months. He has seen the oncologist and dermatologist for this. They have given him creams without relief. He was told that he needs to have his "blood checked" because he is on eliquis.   Current Medications[1]     Past Medical History:   Diagnosis Date    Aortic heart murmur     Asthma     Cancer     Deep vein thrombosis     Emphysema/COPD     Hyperlipidemia     Ischemic cardiomyopathy     Mitral regurgitation     Old myocardial infarction 03/2001    Tricuspid regurgitation     Vitamin B 12 deficiency     Vitamin D deficiency         Family History   Problem Relation Name Age of Onset    Emphysema Father          Past Surgical History:   Procedure Laterality Date    CARDIAC CATHETERIZATION      CARDIAC DEFIBRILLATOR PLACEMENT      CHOLECYSTECTOMY      CORONARY ARTERY BYPASS GRAFT      TOTAL KNEE ARTHROPLASTY Bilateral         Review of Systems   Constitutional:  Negative for fatigue and fever.   HENT:  Negative for ear pain, postnasal drip, rhinorrhea and sinus pressure/congestion.    Respiratory:  Negative for cough and shortness of breath.    Cardiovascular:  Negative for chest pain.   Gastrointestinal:  Negative for abdominal pain, diarrhea, nausea and vomiting.   Genitourinary:  Negative for dysuria.   Neurological:  Negative for headaches.        /71 (BP Location: Left arm, Patient Position: Sitting)   Pulse 73   Temp 98.3 °F (36.8 °C) (Oral)   Resp 17   Ht 6' 1" (1.854 m)   Wt 88.9 kg (196 lb)   SpO2 97%   BMI 25.86 kg/m²      Physical Exam  HENT:      Head: Normocephalic and atraumatic.      Mouth/Throat:      Pharynx: Oropharynx is clear.   Cardiovascular:      Rate and Rhythm: Normal rate and regular rhythm. "   Pulmonary:      Effort: Pulmonary effort is normal.      Breath sounds: Normal breath sounds.   Skin:     Findings: Bruising present.      Comments: Bruising noted to bilateral arms.    Neurological:      Mental Status: He is alert and oriented to person, place, and time.      Gait: Gait abnormal.      Comments: On a walker   Psychiatric:         Mood and Affect: Mood normal.         Behavior: Behavior normal.          Assessment and Plan      ICD-10-CM ICD-9-CM   1. Bruising  T14.8XXA 924.9   2. Other long term (current) drug therapy  Z79.899 V58.69   3. Cirrhosis of liver with ascites, unspecified hepatic cirrhosis type  K74.60 571.5    R18.8    4. Thrombocytopenia, unspecified  D69.6 287.5        1. Bruising  Will check CBC. He has cirrhosis and is on Eliquis. Suspect this is the cause of his bruising.   -     CBC Auto Differential; Future; Expected date: 07/28/2025    2. Other long term (current) drug therapy  -     NT-Pro Natriuretic Peptide  -     Magnesium    3. Cirrhosis of liver with ascites, unspecified hepatic cirrhosis type  -     Protime-INR    4. Thrombocytopenia, unspecified  -     Protime-INR         Follow up if symptoms worsen or fail to improve.            [1]   Current Outpatient Medications:     albuterol (PROVENTIL) 2.5 mg /3 mL (0.083 %) nebulizer solution, Take 3 mLs (2.5 mg total) by nebulization as needed for Wheezing or Shortness of Breath., Disp: 300 mL, Rfl: 5    albuterol (PROVENTIL/VENTOLIN HFA) 90 mcg/actuation inhaler, 2 puffs as needed, Disp: , Rfl:     atorvastatin (LIPITOR) 40 MG tablet, TAKE 1 TABLET BY MOUTH IN THE EVENING FOR CHOLESTEROL, Disp: 90 tablet, Rfl: 1    betamethasone dipropionate 0.05 % cream, Apply at thin layer of cream to the back and chest twice daily as needed for itching and RASH, Disp: 90 g, Rfl: 1    BREO ELLIPTA 200-25 mcg/dose DsDv diskus inhaler, 1 puff once daily., Disp: , Rfl:     buPROPion (WELLBUTRIN) 75 MG tablet, Take 1 tablet (75 mg total) by  mouth 2 (two) times daily. For MOOD, Disp: 60 tablet, Rfl: 5    busPIRone (BUSPAR) 10 MG tablet, Take 1 tablet (10 mg total) by mouth 2 (two) times daily as needed (ANXIETY)., Disp: 60 tablet, Rfl: 5    calcium carbonate/vitamin D3 (CALTRATE 600 + D ORAL), Take 1 capsule by mouth Daily., Disp: , Rfl:     CREON 24,000-76,000 -120,000 unit capsule, Take 1 capsule by mouth 3 (three) times daily., Disp: , Rfl:     cyanocobalamin 1,000 mcg/mL injection, Inject 1 mL (1,000 mcg total) into the muscle every 30 days., Disp: 10 mL, Rfl: 5    ELIQUIS 5 mg Tab, Take 2.5 mg by mouth 2 (two) times daily., Disp: , Rfl:     FARXIGA 10 mg tablet, Take 1 tablet (10 mg total) by mouth once daily., Disp: 30 tablet, Rfl: 5    FEROSUL 325 mg (65 mg iron) Tab tablet, Take 1 tablet (325 mg total) by mouth 2 (two) times daily. For IRON DEFICIENCY, Disp: 180 tablet, Rfl: 1    gas permeable  (OXYGEN PERMEABLE  MISC), Oxygen, Disp: , Rfl:     LORazepam (ATIVAN) 0.5 MG tablet, TAKE 1 TABLET BY MOUTH EVERY 12 HOURS AS NEEDED FOR ANXIETY AND AGITATION, Disp: 30 tablet, Rfl: 2    magnesium oxide (MAG-OX) 400 mg (241.3 mg magnesium) tablet, Take 1 tablet by mouth every morning., Disp: , Rfl:     meclizine (ANTIVERT) 25 mg tablet, Take 1 tablet (25 mg total) by mouth 3 (three) times daily as needed for Dizziness., Disp: 270 tablet, Rfl: 1    memantine (NAMENDA) 10 MG Tab, Take 10 mg by mouth once daily., Disp: , Rfl:     omeprazole (PRILOSEC) 40 MG capsule, Take 40 mg by mouth every morning., Disp: , Rfl:     ondansetron (ZOFRAN-ODT) 4 MG TbDL, Take 1 tablet (4 mg total) by mouth every 8 (eight) hours as needed (Nausea.)., Disp: 120 tablet, Rfl: 3    oxyCODONE-acetaminophen (PERCOCET) 5-325 mg per tablet, Take 1 tablet by mouth every 6 (six) hours as needed for Pain., Disp: 28 tablet, Rfl: 0    sertraline (ZOLOFT) 50 MG tablet, Take 1 tablet (50 mg total) by mouth once daily. For MOOD, Disp: 90 tablet, Rfl: 1     spironolactone (ALDACTONE) 50 MG tablet, Take 1 tablet (50 mg total) by mouth Daily., Disp: 90 tablet, Rfl: 3

## 2025-08-04 ENCOUNTER — RESULTS FOLLOW-UP (OUTPATIENT)
Dept: GASTROENTEROLOGY | Facility: CLINIC | Age: 80
End: 2025-08-04
Payer: MEDICARE

## 2025-08-06 ENCOUNTER — TELEPHONE (OUTPATIENT)
Dept: FAMILY MEDICINE | Facility: CLINIC | Age: 80
End: 2025-08-06
Payer: MEDICARE

## 2025-08-06 NOTE — TELEPHONE ENCOUNTER
Called to inform pt wife Taylor that Dr. Weiss is out of the office until Monday. She had not addressed Mr. Franks lab results at this time.

## 2025-08-08 ENCOUNTER — TELEPHONE (OUTPATIENT)
Dept: GASTROENTEROLOGY | Facility: CLINIC | Age: 80
End: 2025-08-08
Payer: MEDICARE

## 2025-08-08 NOTE — TELEPHONE ENCOUNTER
----- Message from Shahrzad Reynaga NP sent at 8/4/2025  3:47 PM CDT -----  PT looking at come angulation of the blood is normal  ----- Message -----  From: Lab, Background User  Sent: 7/28/2025   9:01 PM CDT  To: Shahrzad Reynaga NP

## 2025-08-11 ENCOUNTER — TELEPHONE (OUTPATIENT)
Dept: GASTROENTEROLOGY | Facility: CLINIC | Age: 80
End: 2025-08-11
Payer: MEDICARE

## 2025-08-21 DIAGNOSIS — Z95.810 PRESENCE OF DOUBLE CHAMBER AUTOMATIC CARDIOVERTER/DEFIBRILLATOR (AICD): Primary | ICD-10-CM
